# Patient Record
Sex: MALE | Race: BLACK OR AFRICAN AMERICAN | NOT HISPANIC OR LATINO | Employment: OTHER | URBAN - METROPOLITAN AREA
[De-identification: names, ages, dates, MRNs, and addresses within clinical notes are randomized per-mention and may not be internally consistent; named-entity substitution may affect disease eponyms.]

---

## 2017-01-08 DIAGNOSIS — H40.1191 PRIMARY OPEN-ANGLE GLAUCOMA, MILD STAGE: ICD-10-CM

## 2017-01-09 RX ORDER — LATANOPROST 50 UG/ML
SOLUTION/ DROPS OPHTHALMIC
Qty: 2.5 ML | Refills: 0 | Status: SHIPPED | OUTPATIENT
Start: 2017-01-09 | End: 2017-02-01 | Stop reason: SDUPTHER

## 2017-02-01 DIAGNOSIS — H40.1191 PRIMARY OPEN-ANGLE GLAUCOMA, MILD STAGE: ICD-10-CM

## 2017-02-01 RX ORDER — LATANOPROST 50 UG/ML
SOLUTION/ DROPS OPHTHALMIC
Qty: 2.5 ML | Refills: 6 | Status: SHIPPED | OUTPATIENT
Start: 2017-02-01 | End: 2017-02-07 | Stop reason: SDUPTHER

## 2017-02-07 ENCOUNTER — OFFICE VISIT (OUTPATIENT)
Dept: OPTOMETRY | Facility: CLINIC | Age: 67
End: 2017-02-07
Payer: MEDICARE

## 2017-02-07 DIAGNOSIS — H52.4 HYPEROPIA WITH PRESBYOPIA, BILATERAL: ICD-10-CM

## 2017-02-07 DIAGNOSIS — H25.13 NUCLEAR SCLEROSIS, BILATERAL: ICD-10-CM

## 2017-02-07 DIAGNOSIS — H40.1131 PRIMARY OPEN ANGLE GLAUCOMA OF BOTH EYES, MILD STAGE: Primary | ICD-10-CM

## 2017-02-07 DIAGNOSIS — H52.03 HYPEROPIA WITH PRESBYOPIA, BILATERAL: ICD-10-CM

## 2017-02-07 PROCEDURE — 92020 GONIOSCOPY: CPT | Mod: PBBFAC,PO | Performed by: OPTOMETRIST

## 2017-02-07 PROCEDURE — 92015 DETERMINE REFRACTIVE STATE: CPT | Mod: ,,, | Performed by: OPTOMETRIST

## 2017-02-07 PROCEDURE — 99999 PR PBB SHADOW E&M-EST. PATIENT-LVL II: CPT | Mod: PBBFAC,,, | Performed by: OPTOMETRIST

## 2017-02-07 PROCEDURE — 92082 INTERMEDIATE VISUAL FIELD XM: CPT | Mod: PBBFAC,PO | Performed by: OPTOMETRIST

## 2017-02-07 PROCEDURE — 92014 COMPRE OPH EXAM EST PT 1/>: CPT | Mod: S$PBB,,, | Performed by: OPTOMETRIST

## 2017-02-07 PROCEDURE — 99212 OFFICE O/P EST SF 10 MIN: CPT | Mod: PBBFAC,PO | Performed by: OPTOMETRIST

## 2017-02-07 PROCEDURE — 92020 GONIOSCOPY: CPT | Mod: S$PBB,,, | Performed by: OPTOMETRIST

## 2017-02-07 RX ORDER — LATANOPROST 50 UG/ML
1 SOLUTION/ DROPS OPHTHALMIC NIGHTLY
Qty: 2.5 ML | Refills: 6 | Status: SHIPPED | OUTPATIENT
Start: 2017-02-07 | End: 2017-11-21 | Stop reason: SDUPTHER

## 2017-02-07 NOTE — PROGRESS NOTES
HPI     Glaucoma    Additional comments: overdue check up           Comments   DLS 1/14/16    Patient returns for overdue follow up. Pt states is broke gls last yr   sometime, using +2.75 readers to help with vision. Denies any pain. No f/f      Eyemeds   Latanoprost OU QHS , used last night       Last edited by Riley Bradshaw, OD on 2/7/2017 12:01 PM. (History)        ROS     Negative for: Constitutional, Gastrointestinal, Neurological, Skin,   Genitourinary, Musculoskeletal, HENT, Endocrine, Cardiovascular, Eyes,   Respiratory, Psychiatric, Allergic/Imm, Heme/Lymph    Last edited by Riley Bradshaw, OD on 2/7/2017 10:53 AM. (History)        Assessment /Plan     For exam results, see Encounter Report.    Primary open angle glaucoma of both eyes, mild stage  -     latanoprost 0.005 % ophthalmic solution; Place 1 drop into both eyes every evening.  Dispense: 2.5 mL; Refill: 6  -     Mederos Visual Field - OU - Intermediate - Both Eyes    Nuclear sclerosis, bilateral    Hyperopia with presbyopia, bilateral      1. Cont drops, IOP low, HVf poor reliability,  Prev Oct abnormal od and normal os. Prev IOP increased due to pt out of meds. Also prev slight increase in cup right eye. May need to add a  Med in future.  Prev HRT ONL od and borderline os. Pachy thin. Prev Gonio open.  RTC 6 mos IOP ck.  Add med if worsening.   2. Educated pt on presence of cataracts and effects on vision. No surgery at this time. Recheck in one year.  3. Spec Rx given. Different lens options discussed with patient. RTC 1 year full exam.

## 2017-02-11 ENCOUNTER — HOSPITAL ENCOUNTER (OUTPATIENT)
Dept: RADIOLOGY | Facility: HOSPITAL | Age: 67
Discharge: HOME | End: 2017-02-11
Attending: NURSE PRACTITIONER
Payer: MEDICARE

## 2017-02-11 ENCOUNTER — TELEPHONE (OUTPATIENT)
Dept: INTERNAL MEDICINE | Facility: CLINIC | Age: 67
End: 2017-02-11

## 2017-02-11 ENCOUNTER — OFFICE VISIT (OUTPATIENT)
Dept: INTERNAL MEDICINE | Facility: CLINIC | Age: 67
End: 2017-02-11
Payer: MEDICARE

## 2017-02-11 VITALS
DIASTOLIC BLOOD PRESSURE: 62 MMHG | TEMPERATURE: 98 F | WEIGHT: 219.13 LBS | BODY MASS INDEX: 29.68 KG/M2 | SYSTOLIC BLOOD PRESSURE: 128 MMHG | HEART RATE: 64 BPM | HEIGHT: 72 IN

## 2017-02-11 DIAGNOSIS — M25.561 ACUTE PAIN OF RIGHT KNEE: ICD-10-CM

## 2017-02-11 DIAGNOSIS — M25.561 ACUTE PAIN OF RIGHT KNEE: Primary | ICD-10-CM

## 2017-02-11 PROCEDURE — 73562 X-RAY EXAM OF KNEE 3: CPT | Mod: 26,RT,, | Performed by: RADIOLOGY

## 2017-02-11 PROCEDURE — 99213 OFFICE O/P EST LOW 20 MIN: CPT | Mod: S$PBB,,, | Performed by: NURSE PRACTITIONER

## 2017-02-11 PROCEDURE — 99999 PR PBB SHADOW E&M-EST. PATIENT-LVL IV: CPT | Mod: PBBFAC,,, | Performed by: NURSE PRACTITIONER

## 2017-02-11 PROCEDURE — 73560 X-RAY EXAM OF KNEE 1 OR 2: CPT | Mod: 26,59,LT, | Performed by: RADIOLOGY

## 2017-02-11 PROCEDURE — 73560 X-RAY EXAM OF KNEE 1 OR 2: CPT | Mod: TC,59,LT

## 2017-02-11 RX ORDER — SULINDAC 150 MG/1
150 TABLET ORAL 2 TIMES DAILY
Qty: 28 TABLET | Refills: 0 | Status: SHIPPED | OUTPATIENT
Start: 2017-02-11 | End: 2017-04-07

## 2017-02-11 NOTE — TELEPHONE ENCOUNTER
Please let the patient know that the xray of the right knee was essentially normal with degenerative changes but left knee was worse than the right.

## 2017-02-11 NOTE — PROGRESS NOTES
Subjective:       Patient ID: Herman Chirinos is a 66 y.o. male.    Chief Complaint: Knee Pain and tingling in shoulder    HPI Comments: Straightening the knee makes it feel better for a moment  Onset 2 months      Knee Pain    There was no injury mechanism. The pain is present in the right knee. The quality of the pain is described as aching. The pain is at a severity of 7/10. The pain is severe. Pertinent negatives include no numbness. Exacerbated by: when the knee is bent. He has tried heat (ointments, wrapping the knee, exercise, walking) for the symptoms.     Review of Systems   Constitutional: Negative for activity change, appetite change, chills, fever and unexpected weight change.   HENT: Negative for congestion, ear discharge, ear pain, nosebleeds, sinus pressure, sneezing and sore throat.    Eyes: Negative for photophobia, discharge, redness, itching and visual disturbance.   Respiratory: Negative for cough, chest tightness, shortness of breath, wheezing and stridor.    Cardiovascular: Negative for chest pain, palpitations and leg swelling.   Gastrointestinal: Negative for abdominal distention, abdominal pain, blood in stool, constipation, diarrhea, nausea and vomiting.   Genitourinary: Negative for dysuria, frequency, hematuria, scrotal swelling, testicular pain and urgency.   Musculoskeletal: Positive for arthralgias. Negative for back pain, myalgias, neck pain and neck stiffness.   Skin: Negative for rash and wound.   Neurological: Negative for dizziness, light-headedness, numbness and headaches.   Hematological: Negative for adenopathy. Does not bruise/bleed easily.   Psychiatric/Behavioral: Negative for suicidal ideas.       Objective:      Physical Exam   Constitutional: He is oriented to person, place, and time. He appears well-developed and well-nourished.   HENT:   Head: Normocephalic and atraumatic.   Eyes: Pupils are equal, round, and reactive to light.   Neck: Normal range of motion.  "  Pulmonary/Chest: Effort normal. No respiratory distress.   Abdominal: He exhibits no distension.   Musculoskeletal: Normal range of motion.        Right knee: He exhibits LCL laxity. He exhibits normal range of motion, no swelling, no effusion, no ecchymosis, no deformity, no laceration, no erythema, normal alignment, normal patellar mobility, no bony tenderness, normal meniscus and no MCL laxity. No tenderness found.   Neurological: He is alert and oriented to person, place, and time.   Skin: Skin is warm and dry.       Assessment:       1. Acute pain of right knee        Plan:       Herman was seen today for knee pain and tingling in shoulder.    Diagnoses and all orders for this visit:    Acute pain of right knee  -     X-ray Knee Ortho Right; Future  -     sulindac (CLINORIL) 150 MG tablet; Take 1 tablet (150 mg total) by mouth 2 (two) times daily.    ace wrap appled, pt understood instructions to r.i.c.e.    Pt has been given instructions populated from [employer] database and has verbalized understanding of the after visit summary and information contained wherein.    Follow up with a primary care provider. May go to ER for acute shortness of breath, lightheadedness, fever, or any other emergent complaints or changes in condition.    "This note will be shared with the patient"    The [employer] medical Dictation software was used during the dictation of portions or the entirety of this medical record.  Phonetic or grammatic errors may exist due to the use of this software. For clarification, refer to the author of the document.             "

## 2017-02-11 NOTE — MR AVS SNAPSHOT
[facility]  [address]  [place] [address]  Phone: [phone]  Fax: [phone]                  Herman Chirinos   2017 3:00 PM   Office Visit    Description:  Male : 1950   Provider:  Saleem Howell DNP   Department:  [facility]           Reason for Visit     Knee Pain     tingling in shoulder           Diagnoses this Visit        Comments    Acute pain of right knee    -  Primary            To Do List           Future Appointments        Provider Department Dept Phone    2017 7:30 AM MD Leobardo Sharma [facility] [phone]      Goals (5 Years of Data)     None       These Medications        Disp Refills Start End    sulindac (CLINORIL) 150 MG tablet 28 tablet 0 2017    Take 1 tablet (150 mg total) by mouth 2 (two) times daily. - Oral    [facility] [address] [place] [address] [place] [address] [address] AT [facility] [address] Ph #: [phone]         [facility] On Call     [facility] On Call Nurse Care Line -  Assistance  Registered nurses in the [facility] On Call Center provide clinical advisement, health education, appointment booking, and other advisory services.  Call for this free service at [phone].             Medications           Message regarding Medications     Verify the changes and/or additions to your medication regime listed below are the same as discussed with your clinician today.  If any of these changes or additions are incorrect, please notify your healthcare provider.        START taking these NEW medications        Refills    sulindac (CLINORIL) 150 MG tablet 0    Sig: Take 1 tablet (150 mg total) by mouth 2 (two) times daily.    Class: Normal    Route: Oral           Verify that the below list of medications is an accurate representation of the medications you are currently taking.  If none reported, the list may be blank. If incorrect, please contact  your healthcare provider. Carry this list with you in case of emergency.           Current Medications     fluticasone (FLONASE) 50 mcg/actuation nasal spray USE 1 SPRAY IN EACH NOSTRIL ONCE DAILY    latanoprost 0.005 % ophthalmic solution Place 1 drop into both eyes every evening.    pravastatin (PRAVACHOL) 40 MG tablet Take 1 tablet (40 mg total) by mouth once daily.    aspirin 81 MG Chew Take 1 tablet (81 mg total) by mouth once daily.    loratadine (CLARITIN) 10 mg tablet Take 1 tablet (10 mg total) by mouth once daily.    sulindac (CLINORIL) 150 MG tablet Take 1 tablet (150 mg total) by mouth 2 (two) times daily.    tamsulosin (FLOMAX) 0.4 mg Cp24 Take 1 capsule (0.4 mg total) by mouth every evening.           Clinical Reference Information           Your Vitals Were     BP Pulse Temp Height Weight BMI    128/62 64 98.4 °F (36.9 °C) (Oral) 6' (1.829 m) 99.4 kg (219 lb 2.2 oz) 29.72 kg/m2      Blood Pressure          Most Recent Value    BP  128/62      Allergies as of 2/11/2017     No Known Allergies      Immunizations Administered on Date of Encounter - 2/11/2017     None      Orders Placed During Today's Visit     Future Labs/Procedures Expected by Expires    X-ray Knee Ortho Right  2/11/2017 2/11/2018      [facility] Sign-Up     Activating your [facility] account is as easy as 1-2-3!     1) Visit [email or web address], select Sign Up Now, enter this activation code and your date of birth, then select Next.  [email or web address]  Expires: 3/28/2017  3:19 PM      2) Create a username and password to use when you visit [facility] in the future and select a security question in case you lose your password and select Next.    3) Enter your e-mail address and click Sign Up!    Additional Information  If you have questions, please e-mail [email or web address] or call [phone] to talk to our [facility] staff. Remember, [facility] is NOT to be used for urgent needs. For medical emergencies, dial 911.         Instructions       R.I.C.E.    R.I.C.E. stands for Rest, Ice, Compression, and Elevation. Doing these things helps limit pain and swelling after an injury. R.I.C.E. also helps injuries heal faster. Use R.I.C.E. for sprains, strains, and severe bruises or bumps. Follow the tips on this handout and begin R.I.C.E. as soon as possible after an injury.  ? Rest  Pain is your bodys way of telling you to rest an injured area. Whether you have hurt an elbow, hand, foot, or knee, limiting its use will prevent further injury and help you heal.  ? Ice  Applying ice right after an injury helps prevent swelling and reduce pain. Dont place ice directly on your skin.  · Wrap a cold pack or bag of ice in a thin cloth. Place it over the injured area.  · Ice for 10 minutes every 3 hours. Dont ice for more than 20 minutes at a time.  ? Compression  Putting pressure (compression) on an injury helps prevent swelling and provides support.  · Wrap the injured area firmly with an elastic bandage. If your hand or foot tingles, becomes discolored, or feels cold to the touch, the bandage may be too tight. Rewrap it more loosely.  · If your bandage becomes too loose, rewrap it.  · Do not wear an elastic bandage overnight.  ? Elevation  Keeping an injury elevated helps reduce swelling, pain, and throbbing. Elevation is most effective when the injury is kept elevated higher than the heart.     Call your healthcare provider if you notice any of the following:  · Fingers or toes feel numb, are cold to the touch, or change color  · Skin looks shiny or tight  · Pain, swelling, or bruising worsens and is not improved with elevation   Date Last Reviewed: 9/3/2015  © 6968-4946 [employer]. [address] [place] [address], [place] [address]. All rights reserved. This information is not intended as a substitute for professional medical care. Always follow your healthcare professional's instructions.        Knee Pain  Knee pain is very common. Its especially  common in active people who put a lot of pressure on their knees, like runners. It affects women more often than men.  Your kneecap (patella) is a thick, round bone. It covers and protects the front portion of your knee joint. It moves along a groove in your thighbone (femur) as part of the patellofemoral joint. A layer of cartilage surrounds the underside of your kneecap. This layer protects it from grinding against your femur.  When this cartilage softens and breaks down, it can cause knee pain. This is partly because of repetitive stress. The stress irritates the lining of the joint. This causes pain in the underlying bone.  What causes knee pain?  Many things can cause knee pain. You may have more than one cause. Some of these include:  · Overuse of the knee joint  · The kneecap doesnt line up with the tissue around it  · Damage to small nerves in the area  · Damage to the ligament-like structure that holds the kneecap in place (retinaculum)  · Breakdown of the bone under the cartilage  · Swelling in the soft tissues around the kneecap  · Injury  You might be more likely to have knee pain if you:  · Exercise a lot  · Recently increased the intensity of your workouts  · Have a body mass index (BMI) greater than 25  · Have poor alignment of your kneecap  · Walk with your feet turned overly outward or inward  · Have weakness in surrounding muscle groups (inner quad or hip adductor muscles)  · Have too much tightness in surrounding muscle groups (hamstrings or iliotibial band)  · Have a recent history of injury to the area  · Are female  Symptoms of knee pain  This type of knee pain is a dull, aching pain in the front of the knee in the area under and around the kneecap. This pain may start quickly or slowly. Your pain might be worse when you squat, run, or sit for a long time. You might also sometimes feel like your knee is giving out. You may have symptoms in one or both of your knees.  Diagnosing knee pain  Your  health care provider will ask about your medical history and your symptoms. Be sure to describe any activities that make your knee pain worse. He or she will look at your knee. This will include tests of your range of motion, strength, and areas of pain of your knee. Your knee alignment will be checked.  Your health care provider will need to rule out other causes of your knee pain, such as arthritis. You may need an imaging test, such as an X-ray or MRI.  Treatment for knee pain  Treatments that can help ease your symptoms may include:  · Avoiding activities for a while that make your pain worse, returning to activity over time  · Icing the outside of your knee when it causes you pain  · Taking over-the-counter pain medicine  · Wearing a knee brace or taping your knee to support it  · Wearing special shoe inserts to help keep your feet in the proper alignment  · Doing special exercises to stretch and strengthen the muscles around your hip and your knee  These steps help most people manage knee pain. But some cases of knee pain need to be treated with surgery. You may need surgery right away. Or you may need it later if other treatments dont work. Your health care provider may refer you to an orthopedic surgeon. He or she will talk with you about your choices.  Preventing knee pain  Losing weight and correcting excess muscle tightness or muscle weakness may help lower your risk.  In some cases, you can prevent knee pain. To help prevent a flare-up of knee pain, you do these things:  · Regularly do all the exercises your doctor or physical therapist advises  · Support your knee as advised by your doctor or physical therapist  · Increase training gradually, and ease up on training when needed  · Have an expert check your gait for running or other sporting activities  · Stretch properly before and after exercise  · Replace your running shoes regularly  · Lose excess weight     When to call your health care  provider  Call your health care provider right away if:  · Your symptoms dont get better after a few weeks of treatment  · You have any new symptoms   Date Last Reviewed: 3/19/2015  © 3928-1220 [employer]. [address] [place] [address], [place] [address]. All rights reserved. This information is not intended as a substitute for professional medical care. Always follow your healthcare professional's instructions.             Language Assistance Services     ATTENTION: Language assistance services are available, free of charge. Please call [phone].      ATENCIÓN: Si habla español, tiene a quach disposición servicios gratuitos de asistencia lingüística. Llame al [phone].     CRISELDA Ý: N?u b?n nói Ti?ng Vi?t, có các d?ch v? h? tr? ngôn ng? mi?n phí dành cho b?n. G?i s? [phone].         [facility] complies with applicable Federal civil rights laws and does not discriminate on the basis of race, color, national origin, age, disability, or sex.

## 2017-02-11 NOTE — PROGRESS NOTES
Please see related notes in phone encounter of today's date.    MA/Nurse:  Please call the patient with these results.

## 2017-02-11 NOTE — PATIENT INSTRUCTIONS
R.I.C.E.    R.I.C.E. stands for Rest, Ice, Compression, and Elevation. Doing these things helps limit pain and swelling after an injury. R.I.C.E. also helps injuries heal faster. Use R.I.C.E. for sprains, strains, and severe bruises or bumps. Follow the tips on this handout and begin R.I.C.E. as soon as possible after an injury.  ? Rest  Pain is your bodys way of telling you to rest an injured area. Whether you have hurt an elbow, hand, foot, or knee, limiting its use will prevent further injury and help you heal.  ? Ice  Applying ice right after an injury helps prevent swelling and reduce pain. Dont place ice directly on your skin.  · Wrap a cold pack or bag of ice in a thin cloth. Place it over the injured area.  · Ice for 10 minutes every 3 hours. Dont ice for more than 20 minutes at a time.  ? Compression  Putting pressure (compression) on an injury helps prevent swelling and provides support.  · Wrap the injured area firmly with an elastic bandage. If your hand or foot tingles, becomes discolored, or feels cold to the touch, the bandage may be too tight. Rewrap it more loosely.  · If your bandage becomes too loose, rewrap it.  · Do not wear an elastic bandage overnight.  ? Elevation  Keeping an injury elevated helps reduce swelling, pain, and throbbing. Elevation is most effective when the injury is kept elevated higher than the heart.     Call your healthcare provider if you notice any of the following:  · Fingers or toes feel numb, are cold to the touch, or change color  · Skin looks shiny or tight  · Pain, swelling, or bruising worsens and is not improved with elevation   Date Last Reviewed: 9/3/2015  © 6500-4334 [employer]. [address] [place] [address], [place] [address]. All rights reserved. This information is not intended as a substitute for professional medical care. Always follow your healthcare professional's instructions.        Knee Pain  Knee pain is very common. Its especially  common in active people who put a lot of pressure on their knees, like runners. It affects women more often than men.  Your kneecap (patella) is a thick, round bone. It covers and protects the front portion of your knee joint. It moves along a groove in your thighbone (femur) as part of the patellofemoral joint. A layer of cartilage surrounds the underside of your kneecap. This layer protects it from grinding against your femur.  When this cartilage softens and breaks down, it can cause knee pain. This is partly because of repetitive stress. The stress irritates the lining of the joint. This causes pain in the underlying bone.  What causes knee pain?  Many things can cause knee pain. You may have more than one cause. Some of these include:  · Overuse of the knee joint  · The kneecap doesnt line up with the tissue around it  · Damage to small nerves in the area  · Damage to the ligament-like structure that holds the kneecap in place (retinaculum)  · Breakdown of the bone under the cartilage  · Swelling in the soft tissues around the kneecap  · Injury  You might be more likely to have knee pain if you:  · Exercise a lot  · Recently increased the intensity of your workouts  · Have a body mass index (BMI) greater than 25  · Have poor alignment of your kneecap  · Walk with your feet turned overly outward or inward  · Have weakness in surrounding muscle groups (inner quad or hip adductor muscles)  · Have too much tightness in surrounding muscle groups (hamstrings or iliotibial band)  · Have a recent history of injury to the area  · Are female  Symptoms of knee pain  This type of knee pain is a dull, aching pain in the front of the knee in the area under and around the kneecap. This pain may start quickly or slowly. Your pain might be worse when you squat, run, or sit for a long time. You might also sometimes feel like your knee is giving out. You may have symptoms in one or both of your knees.  Diagnosing knee pain  Your  health care provider will ask about your medical history and your symptoms. Be sure to describe any activities that make your knee pain worse. He or she will look at your knee. This will include tests of your range of motion, strength, and areas of pain of your knee. Your knee alignment will be checked.  Your health care provider will need to rule out other causes of your knee pain, such as arthritis. You may need an imaging test, such as an X-ray or MRI.  Treatment for knee pain  Treatments that can help ease your symptoms may include:  · Avoiding activities for a while that make your pain worse, returning to activity over time  · Icing the outside of your knee when it causes you pain  · Taking over-the-counter pain medicine  · Wearing a knee brace or taping your knee to support it  · Wearing special shoe inserts to help keep your feet in the proper alignment  · Doing special exercises to stretch and strengthen the muscles around your hip and your knee  These steps help most people manage knee pain. But some cases of knee pain need to be treated with surgery. You may need surgery right away. Or you may need it later if other treatments dont work. Your health care provider may refer you to an orthopedic surgeon. He or she will talk with you about your choices.  Preventing knee pain  Losing weight and correcting excess muscle tightness or muscle weakness may help lower your risk.  In some cases, you can prevent knee pain. To help prevent a flare-up of knee pain, you do these things:  · Regularly do all the exercises your doctor or physical therapist advises  · Support your knee as advised by your doctor or physical therapist  · Increase training gradually, and ease up on training when needed  · Have an expert check your gait for running or other sporting activities  · Stretch properly before and after exercise  · Replace your running shoes regularly  · Lose excess weight     When to call your health care  provider  Call your health care provider right away if:  · Your symptoms dont get better after a few weeks of treatment  · You have any new symptoms   Date Last Reviewed: 3/19/2015  © 1975-4191 [employer]. [address] [place] [address], [place] [address]. All rights reserved. This information is not intended as a substitute for professional medical care. Always follow your healthcare professional's instructions.

## 2017-02-13 DIAGNOSIS — E78.5 HYPERLIPIDEMIA: ICD-10-CM

## 2017-02-13 RX ORDER — PRAVASTATIN SODIUM 40 MG/1
TABLET ORAL
Qty: 90 TABLET | Refills: 0 | Status: SHIPPED | OUTPATIENT
Start: 2017-02-13 | End: 2017-04-10 | Stop reason: SDUPTHER

## 2017-02-14 DIAGNOSIS — E78.5 HYPERLIPIDEMIA: ICD-10-CM

## 2017-02-14 RX ORDER — PRAVASTATIN SODIUM 40 MG/1
TABLET ORAL
Qty: 90 TABLET | Refills: 0 | Status: SHIPPED | OUTPATIENT
Start: 2017-02-14 | End: 2017-04-07

## 2017-02-21 ENCOUNTER — TELEPHONE (OUTPATIENT)
Dept: INTERNAL MEDICINE | Facility: CLINIC | Age: 67
End: 2017-02-21

## 2017-02-21 NOTE — TELEPHONE ENCOUNTER
----- Message from Lisa Rene sent at 2/20/2017  4:44 PM CST -----  Contact: [phone] or [phone]  Patient would like to get test results.  Name of test (lab, mammo, etc.):  X rays  Date of test:  A week ago   Ordering provider: mary  Where was the test performed:  [facility]  Comments:

## 2017-02-24 ENCOUNTER — TELEPHONE (OUTPATIENT)
Dept: INTERNAL MEDICINE | Facility: CLINIC | Age: 67
End: 2017-02-24

## 2017-02-24 NOTE — TELEPHONE ENCOUNTER
Spoke with patient-he had not been taking the sulindac as prescribed, because he misunderstood his discharge instructions.  He will try taking it BID to see if knee pain resolves & will call us if it does not.  He asked for us to send a message to his PCP to have her fill out his annual handicap tag paperwork for him.  Will route to Dr Peck.

## 2017-02-24 NOTE — TELEPHONE ENCOUNTER
----- Message from Ag Phan sent at 2/24/2017  8:43 AM CST -----  Contact: self/ [phone] cell  Pt is calling to f/u with the office regarding his Xray results and next steps to take.  Pt would like a call back today, if possible.  Please call and advise.      Thank you

## 2017-04-07 ENCOUNTER — OFFICE VISIT (OUTPATIENT)
Dept: ORTHOPEDICS | Facility: CLINIC | Age: 67
End: 2017-04-07
Payer: MEDICARE

## 2017-04-07 ENCOUNTER — OFFICE VISIT (OUTPATIENT)
Dept: INTERNAL MEDICINE | Facility: CLINIC | Age: 67
End: 2017-04-07
Payer: MEDICARE

## 2017-04-07 ENCOUNTER — LAB VISIT (OUTPATIENT)
Dept: LAB | Facility: HOSPITAL | Age: 67
End: 2017-04-07
Attending: INTERNAL MEDICINE
Payer: MEDICARE

## 2017-04-07 VITALS
BODY MASS INDEX: 28.97 KG/M2 | HEIGHT: 72 IN | WEIGHT: 213.88 LBS | SYSTOLIC BLOOD PRESSURE: 112 MMHG | HEART RATE: 76 BPM | TEMPERATURE: 98 F | DIASTOLIC BLOOD PRESSURE: 74 MMHG

## 2017-04-07 DIAGNOSIS — Z12.5 ENCOUNTER FOR SCREENING FOR MALIGNANT NEOPLASM OF PROSTATE: ICD-10-CM

## 2017-04-07 DIAGNOSIS — N40.0 BENIGN NODULAR PROSTATIC HYPERPLASIA WITHOUT LOWER URINARY TRACT SYMPTOMS: ICD-10-CM

## 2017-04-07 DIAGNOSIS — R93.1 AGATSTON CORONARY ARTERY CALCIUM SCORE LESS THAN 100: ICD-10-CM

## 2017-04-07 DIAGNOSIS — N52.9 ERECTILE DYSFUNCTION, UNSPECIFIED ERECTILE DYSFUNCTION TYPE: ICD-10-CM

## 2017-04-07 DIAGNOSIS — E66.09 NON MORBID OBESITY DUE TO EXCESS CALORIES: ICD-10-CM

## 2017-04-07 DIAGNOSIS — N13.8 BPH WITH URINARY OBSTRUCTION: ICD-10-CM

## 2017-04-07 DIAGNOSIS — D12.6 TUBULAR ADENOMA OF COLON: ICD-10-CM

## 2017-04-07 DIAGNOSIS — M17.11 OSTEOARTHRITIS OF RIGHT KNEE, UNSPECIFIED OSTEOARTHRITIS TYPE: Primary | ICD-10-CM

## 2017-04-07 DIAGNOSIS — N40.1 BPH WITH URINARY OBSTRUCTION: ICD-10-CM

## 2017-04-07 DIAGNOSIS — E78.2 MIXED HYPERLIPIDEMIA: ICD-10-CM

## 2017-04-07 DIAGNOSIS — R73.01 IFG (IMPAIRED FASTING GLUCOSE): Primary | ICD-10-CM

## 2017-04-07 DIAGNOSIS — M25.561 CHRONIC PAIN OF RIGHT KNEE: ICD-10-CM

## 2017-04-07 DIAGNOSIS — R73.01 IFG (IMPAIRED FASTING GLUCOSE): ICD-10-CM

## 2017-04-07 DIAGNOSIS — M23.8X1 MCL DEFICIENCY, KNEE, RIGHT: ICD-10-CM

## 2017-04-07 DIAGNOSIS — G89.29 CHRONIC PAIN OF RIGHT KNEE: ICD-10-CM

## 2017-04-07 DIAGNOSIS — M48.061 LUMBAR SPINAL STENOSIS: ICD-10-CM

## 2017-04-07 LAB
ALBUMIN SERPL BCP-MCNC: 3.7 G/DL
ALP SERPL-CCNC: 45 U/L
ALT SERPL W/O P-5'-P-CCNC: 16 U/L
ANION GAP SERPL CALC-SCNC: 10 MMOL/L
AST SERPL-CCNC: 25 U/L
BASOPHILS # BLD AUTO: 0.06 K/UL
BASOPHILS NFR BLD: 1 %
BILIRUB SERPL-MCNC: 0.6 MG/DL
BUN SERPL-MCNC: 15 MG/DL
CALCIUM SERPL-MCNC: 9.4 MG/DL
CHLORIDE SERPL-SCNC: 105 MMOL/L
CHOLEST/HDLC SERPL: 3.6 {RATIO}
CO2 SERPL-SCNC: 25 MMOL/L
COMPLEXED PSA SERPL-MCNC: 3 NG/ML
CREAT SERPL-MCNC: 0.9 MG/DL
DIFFERENTIAL METHOD: ABNORMAL
EOSINOPHIL # BLD AUTO: 0.1 K/UL
EOSINOPHIL NFR BLD: 0.9 %
ERYTHROCYTE [DISTWIDTH] IN BLOOD BY AUTOMATED COUNT: 15.1 %
EST. GFR  (AFRICAN AMERICAN): >60 ML/MIN/1.73 M^2
EST. GFR  (NON AFRICAN AMERICAN): >60 ML/MIN/1.73 M^2
ESTIMATED AVG GLUCOSE: 128 MG/DL
GLUCOSE SERPL-MCNC: 100 MG/DL
HBA1C MFR BLD HPLC: 6.1 %
HCT VFR BLD AUTO: 42.8 %
HDL/CHOLESTEROL RATIO: 27.6 %
HDLC SERPL-MCNC: 174 MG/DL
HDLC SERPL-MCNC: 48 MG/DL
HGB BLD-MCNC: 14.4 G/DL
LDLC SERPL CALC-MCNC: 113.8 MG/DL
LYMPHOCYTES # BLD AUTO: 1.3 K/UL
LYMPHOCYTES NFR BLD: 22.1 %
MCH RBC QN AUTO: 29.4 PG
MCHC RBC AUTO-ENTMCNC: 33.6 %
MCV RBC AUTO: 88 FL
MONOCYTES # BLD AUTO: 0.6 K/UL
MONOCYTES NFR BLD: 10.6 %
NEUTROPHILS # BLD AUTO: 3.8 K/UL
NEUTROPHILS NFR BLD: 65.2 %
NONHDLC SERPL-MCNC: 126 MG/DL
PLATELET # BLD AUTO: 255 K/UL
PMV BLD AUTO: 9.3 FL
POTASSIUM SERPL-SCNC: 4.1 MMOL/L
PROT SERPL-MCNC: 7.2 G/DL
RBC # BLD AUTO: 4.89 M/UL
SODIUM SERPL-SCNC: 140 MMOL/L
TRIGL SERPL-MCNC: 61 MG/DL
WBC # BLD AUTO: 5.85 K/UL

## 2017-04-07 PROCEDURE — 99214 OFFICE O/P EST MOD 30 MIN: CPT | Mod: S$PBB,,, | Performed by: INTERNAL MEDICINE

## 2017-04-07 PROCEDURE — 99211 OFF/OP EST MAY X REQ PHY/QHP: CPT | Mod: PBBFAC | Performed by: PHYSICIAN ASSISTANT

## 2017-04-07 PROCEDURE — 99203 OFFICE O/P NEW LOW 30 MIN: CPT | Mod: S$PBB,,, | Performed by: PHYSICIAN ASSISTANT

## 2017-04-07 PROCEDURE — 99999 PR PBB SHADOW E&M-EST. PATIENT-LVL IV: CPT | Mod: PBBFAC,,, | Performed by: INTERNAL MEDICINE

## 2017-04-07 PROCEDURE — 99999 PR PBB SHADOW E&M-EST. PATIENT-LVL I: CPT | Mod: PBBFAC,,, | Performed by: PHYSICIAN ASSISTANT

## 2017-04-07 RX ORDER — TAMSULOSIN HYDROCHLORIDE 0.4 MG/1
0.4 CAPSULE ORAL NIGHTLY
Qty: 90 CAPSULE | Refills: 3 | Status: SHIPPED | OUTPATIENT
Start: 2017-04-07 | End: 2018-04-08 | Stop reason: SDUPTHER

## 2017-04-07 RX ORDER — SILDENAFIL 50 MG/1
50 TABLET, FILM COATED ORAL DAILY PRN
Qty: 30 TABLET | Refills: 6 | Status: SHIPPED | OUTPATIENT
Start: 2017-04-07 | End: 2019-04-26 | Stop reason: SDUPTHER

## 2017-04-07 NOTE — MR AVS SNAPSHOT
[facility]  [address]  [place] [address]  Phone: [phone]  Fax: [phone]                  Herman Chirinos   2017 7:30 AM   Office Visit    Description:  Male : 1950   Provider:  Betsy Haines MD   Department:  [facility]           Reason for Visit     Knee Pain     Hypertension     Hyperlipidemia           Diagnoses this Visit        Comments    IFG (impaired fasting glucose)    -  Primary     Non morbid obesity due to excess calories         Mixed hyperlipidemia         Agatston coronary artery calcium score less than 100         Tubular adenoma of colon         Chronic pain of right knee         Encounter for screening for malignant neoplasm of prostate         Benign nodular prostatic hyperplasia without lower urinary tract symptoms         BPH with urinary obstruction         Erectile dysfunction, unspecified erectile dysfunction type         Lumbar spinal stenosis                To Do List           Future Appointments        Provider Department Dept Phone    2017 8:10 AM LAB, APPOINTMENT [facility] [phone]      Goals (5 Years of Data)     None       These Medications        Disp Refills Start End    tamsulosin (FLOMAX) 0.4 mg Cp24 90 capsule 3 2017    Take 1 capsule (0.4 mg total) by mouth every evening. - Oral    Pharmacy: [facility] [id number] Ph #: [phone]       sildenafil (VIAGRA) 50 MG tablet 30 tablet 6 2017    Take 1 tablet (50 mg total) by mouth daily as needed for Erectile Dysfunction. - Oral    Pharmacy: [facility] [id number] [place] Ph #: [phone]         [facility]     [facility] Nurse Care Line -  Assistance  Unless otherwise directed by your provider, please contact [facility], our nurse care  line that is available for 24/7 assistance.     Registered nurses in the [facility] provide: appointment scheduling, clinical advisement, health education, and other advisory services.  Call: [phone] (toll free)               Medications           Message regarding Medications     Verify the changes and/or additions to your medication regime listed below are the same as discussed with your clinician today.  If any of these changes or additions are incorrect, please notify your healthcare provider.        START taking these NEW medications        Refills    sildenafil (VIAGRA) 50 MG tablet 6    Sig: Take 1 tablet (50 mg total) by mouth daily as needed for Erectile Dysfunction.    Class: Normal    Route: Oral      STOP taking these medications     sulindac (CLINORIL) 150 MG tablet Take 1 tablet (150 mg total) by mouth 2 (two) times daily.           Verify that the below list of medications is an accurate representation of the medications you are currently taking.  If none reported, the list may be blank. If incorrect, please contact your healthcare provider. Carry this list with you in case of emergency.           Current Medications     aspirin 81 MG Chew Take 1 tablet (81 mg total) by mouth once daily.    fluticasone (FLONASE) 50 mcg/actuation nasal spray USE 1 SPRAY IN EACH NOSTRIL ONCE DAILY    latanoprost 0.005 % ophthalmic solution Place 1 drop into both eyes every evening.    loratadine (CLARITIN) 10 mg tablet Take 1 tablet (10 mg total) by mouth once daily.    pravastatin (PRAVACHOL) 40 MG tablet TAKE 1 TABLET BY MOUTH EVERY DAY    sildenafil (VIAGRA) 50 MG tablet Take 1 tablet (50 mg total) by mouth daily as needed for Erectile Dysfunction.    tamsulosin (FLOMAX) 0.4 mg Cp24 Take 1 capsule (0.4 mg total) by mouth every evening.           Clinical Reference Information           Your Vitals Were     BP Pulse Temp Height Weight BMI    112/74 76 97.9 °F (36.6 °C) (Oral) 6' (1.829 m) 97 kg (213  lb 13.5 oz) 29 kg/m2      Blood Pressure          Most Recent Value    BP  112/74      Allergies as of 4/7/2017     No Known Allergies      Immunizations Administered on Date of Encounter - 4/7/2017     None      Orders Placed During Today's Visit      Normal Orders This Visit    Ambulatory Consult to [facility]     Ambulatory referral to Orthopedics     Future Labs/Procedures Expected by Expires    CBC auto differential  4/7/2017 4/7/2018    Comprehensive metabolic panel  4/7/2017 4/7/2018    Hemoglobin A1c  4/7/2017 4/7/2018    Lipid panel  4/7/2017 4/7/2018    PSA, Screening  4/7/2017 4/7/2018      [facility] Sign-Up     Activating your [facility] account is as easy as 1-2-3!     1) Visit [email or web address], select Sign Up Now, enter this activation code and your date of birth, then select Next.  [email or web address]  Expires: 5/22/2017  8:01 AM      2) Create a username and password to use when you visit [facility] in the future and select a security question in case you lose your password and select Next.    3) Enter your e-mail address and click Sign Up!    Additional Information  If you have questions, please e-mail [email or web address] or call [phone] to talk to our [facility] staff. Remember, [facility] is NOT to be used for urgent needs. For medical emergencies, dial 911.         Instructions      Shingles  Shingles is a viral infection caused by the same virus as chicken pox. Anyone who has had chicken pox may get shingles later in life. The virus stays in the body, but remains dormant (asleep). Shingles often occurs in older persons or persons with lowered immunity. But it can affect anyone at any age.  Shingles starts as a tingling patch of skin on one side of the body. Small, painful blisters may then appear. The rash does not spread to the rest of the body.  Exposure to shingles cannot cause shingles. However, it can cause chicken pox in anyone who has not had chicken pox or has not been  vaccinated. The contagious period ends when all blisters have crusted over (generally about 2 weeks after the illness begins).  After the blisters heal, the affected skin may be sensitive or painful for months (neuralgia). This often gradually goes away.  A shingles vaccine is available. This can help prevent shingles or make it less painful. It is generally recommended for adults over the age of 60 who have had chicken pox in the past, but who have never had shingles. Adults over 60 who have had neither chicken pox nor shingles can prevent both diseases with the chicken pox vaccine. Ask your healthcare provider about these vaccines.  Home care  · Medicines may be prescribed to help relieve pain. Take these medicines as directed. Ask your healthcare provider or pharmacist before using over-the-counter medicines for helping treat pain and itching.  · In certain cases, antiviral medicines may be prescribed to reduce pain, shorten the illness, and prevent neuralgia. Take these medicines as directed.  · Compresses made from a solution of cool water mixed with cornstarch or baking soda may help relieve pain and itching.   · Gently wash skin daily with soap and water to help prevent infection.  Be certain to rinse off all of the soap, which can be irritating.  · Trim fingernails and try not to scratch. Scratching the sores may leave scars.  · Stay home from work or school until all blisters have formed a crust and you are no longer contagious.  Follow-up care  Follow up with your healthcare provider or as directed by our staff.  When to seek medical advice  · Fever of 100.4°F (38°C) or higher, or as directed by your healthcare provider  · Affected skin is on the face or neck and any of the following occur:  ¨ Headache  ¨ Eye pain  ¨ Changes in vision  ¨ Sores near the eye  ¨ Weakness of facial muscles  · Pain, redness, or swelling of a joint  · Signs of skin infection: colored drainage from the sores, warmth, increasing  redness, or increasing pain  Date Last Reviewed: 9/25/2015  © 0948-9177 [employer]. [address] [place] [address], [place] [address]. All rights reserved. This information is not intended as a substitute for professional medical care. Always follow your healthcare professional's instructions.             Language Assistance Services     ATTENTION: Language assistance services are available, free of charge. Please call [phone].      ATENCIÓN: Si habla español, tiene a quach disposición servicios gratuitos de asistencia lingüística. Llame al [phone].     CRISELDA Ý: N?u b?n nói Ti?ng Vi?t, có các d?ch v? h? tr? ngôn ng? mi?n phí dành cho b?n. G?i s? [phone].         [facility] complies with applicable Federal civil rights laws and does not discriminate on the basis of race, color, national origin, age, disability, or sex.

## 2017-04-07 NOTE — PROGRESS NOTES
Subjective:       Patient ID: Herman Chirinos is a 66 y.o. male.    Chief Complaint: Knee Pain (right pain=6); Hypertension; and Hyperlipidemia    HPI Comments: Follow up multiple medical issues.     Knee pain R hurts a little, aching feeling.   If he sits, it may radiate a little- knee gets stiff.  If he stretches it this relieves it.  Sx not worse with walking.  In fact, when standing sx are better.  Worse with sitting.    He has had sx for many months.  No recalled injury.   Has tried BenGay, Alleve.  Gets some relief with Alleve.    Also a small painless lump on the chest.    Lipids and glucose to be checked.    BP doing well.    Tubular adenoma 4/16 due 2021 again.    Patient Active Problem List:     Chronic rhinitis     Lumbar stenosis     Mixed hyperlipidemia     POAG (primary open-angle glaucoma) - Both Eyes     Dry eyes - Both Eyes     Nuclear sclerosis     Erectile dysfunction     Agatston coronary artery calcium score less than 100     IFG (impaired fasting glucose)     Non morbid obesity due to excess calories     Tubular adenoma of colon: 4/16 repeat 2021      Knee Pain      Hypertension   Pertinent negatives include no chest pain, headaches or palpitations.   Hyperlipidemia   Pertinent negatives include no chest pain.     Review of Systems   Constitutional: Negative for activity change and unexpected weight change.   HENT: Negative for hearing loss, rhinorrhea and trouble swallowing.    Eyes: Negative for discharge and visual disturbance.   Respiratory: Negative for wheezing.    Cardiovascular: Negative for chest pain and palpitations.   Gastrointestinal: Negative for blood in stool, constipation, diarrhea and vomiting.   Endocrine: Negative for polydipsia and polyuria.   Genitourinary: Negative for difficulty urinating, hematuria and urgency.        ED  Nocturia, BPH sx   Musculoskeletal: Positive for arthralgias and back pain. Negative for joint swelling.        See above  Also spinal stenosis    Neurological: Negative for weakness and headaches.   Psychiatric/Behavioral: Negative for confusion and dysphoric mood.       Objective:      Physical Exam   Constitutional: He is oriented to person, place, and time. He appears well-developed and well-nourished.   HENT:   Head: Normocephalic and atraumatic.   Right Ear: External ear normal.   Left Ear: External ear normal.   Eyes: Conjunctivae and EOM are normal.   Neck: Normal range of motion. Neck supple. No thyromegaly present.   Cardiovascular: Normal rate and regular rhythm.    No murmur heard.  Pulmonary/Chest: Effort normal and breath sounds normal. No respiratory distress. He has no wheezes.   Abdominal: Soft. He exhibits no distension. There is no tenderness.   Musculoskeletal: He exhibits no edema or tenderness.   Lymphadenopathy:     He has no cervical adenopathy.   Neurological: He is alert and oriented to person, place, and time. No cranial nerve deficit.   Skin: Skin is warm and dry.   Psychiatric: He has a normal mood and affect. His behavior is normal.       Assessment:       1. IFG (impaired fasting glucose)    2. Non morbid obesity due to excess calories    3. Mixed hyperlipidemia    4. Agatston coronary artery calcium score less than 100    5. Tubular adenoma of colon: 4/16 repeat 2021        Plan:         Herman was seen today for knee pain, hypertension and hyperlipidemia.    Diagnoses and all orders for this visit:    IFG (impaired fasting glucose)  -     Hemoglobin A1c; Future    Non morbid obesity due to excess calories    Mixed hyperlipidemia  -     CBC auto differential; Future  -     Comprehensive metabolic panel; Future  -     Lipid panel; Future    Agatston coronary artery calcium score less than 100    Tubular adenoma of colon: 4/16 repeat 2021    Chronic pain of right knee  -     Ambulatory referral to Orthopedics    Encounter for screening for malignant neoplasm of prostate  -     PSA, Screening; Future    Benign nodular prostatic  hyperplasia without lower urinary tract symptoms: no alarm sx- he declines additional tx or Urology assessment    BPH with urinary obstruction  -     tamsulosin (FLOMAX) 0.4 mg Cp24; Take 1 capsule (0.4 mg total) by mouth every evening.    Erectile dysfunction, unspecified erectile dysfunction type  -     sildenafil (VIAGRA) 50 MG tablet; Take 1 tablet (50 mg total) by mouth daily as needed for Erectile Dysfunction.    Lumbar spinal stenosis  -     Ambulatory Consult to [facility]    I will review all studies and determine further tx depending on findings

## 2017-04-07 NOTE — PROGRESS NOTES
Subjective:      Patient ID: Herman Chirinos is a 66 y.o. male.    Chief Complaint: Pain of the Right Knee    HPI    Patient is a 66 year old male who presents to clinic with chief complaint of a-traumatic intermittent right knee pain x awhile. Pain is increased with sitting and increased walking. Patient stated that stretching the leg helps as well as Aleve. He has also tried Bengay which helps. Patient reports popping cracking and feeling weak at times. Denied catching.     Review of Systems   Constitution: Negative for chills and fever.   Cardiovascular: Negative for chest pain.   Respiratory: Negative for cough and shortness of breath.    Skin: Negative for color change, dry skin, itching, nail changes, poor wound healing and rash.   Musculoskeletal:        Right knee pain   Neurological: Negative for dizziness.   Psychiatric/Behavioral: Negative for altered mental status. The patient is not nervous/anxious.    All other systems reviewed and are negative.        Objective:            General    Constitutional: He is oriented to person, place, and time. He appears well-developed and well-nourished. No distress.   HENT:   Head: Atraumatic.   Eyes: Conjunctivae are normal.   Cardiovascular: Normal rate.    Pulmonary/Chest: Effort normal.   Neurological: He is alert and oriented to person, place, and time.   Psychiatric: He has a normal mood and affect. His behavior is normal.           Right Knee Exam     Inspection   Swelling: absent  Bruising: absent    Tenderness   The patient is tender to palpation of the MCL.    Tests   Meniscus   Georgie:  Medial - negative Lateral - negative  Ligament Examination Lachman: normal (-1 to 2mm) PCL-Posterior Drawer: normal (0 to 2mm)     MCL - Valgus: normal (0 to 2mm)  LCL - Varus: normal    Other   Sensation: normal    Muscle Strength   Right Lower Extremity   Quadriceps:  5/5   Hamstrin/5     RADS: no fracture or dislocation mild to moderate degenerative changes            Assessment:       Encounter Diagnoses   Name Primary?    Osteoarthritis of right knee, unspecified osteoarthritis type Yes    MCL deficiency, knee, right           Plan:       Discussed treatment option with patient At this time he would like to   Rest ice elevation and OTC NSAID.   return to clinic as needed.

## 2017-04-07 NOTE — PATIENT INSTRUCTIONS
Shingles  Shingles is a viral infection caused by the same virus as chicken pox. Anyone who has had chicken pox may get shingles later in life. The virus stays in the body, but remains dormant (asleep). Shingles often occurs in older persons or persons with lowered immunity. But it can affect anyone at any age.  Shingles starts as a tingling patch of skin on one side of the body. Small, painful blisters may then appear. The rash does not spread to the rest of the body.  Exposure to shingles cannot cause shingles. However, it can cause chicken pox in anyone who has not had chicken pox or has not been vaccinated. The contagious period ends when all blisters have crusted over (generally about 2 weeks after the illness begins).  After the blisters heal, the affected skin may be sensitive or painful for months (neuralgia). This often gradually goes away.  A shingles vaccine is available. This can help prevent shingles or make it less painful. It is generally recommended for adults over the age of 60 who have had chicken pox in the past, but who have never had shingles. Adults over 60 who have had neither chicken pox nor shingles can prevent both diseases with the chicken pox vaccine. Ask your healthcare provider about these vaccines.  Home care  · Medicines may be prescribed to help relieve pain. Take these medicines as directed. Ask your healthcare provider or pharmacist before using over-the-counter medicines for helping treat pain and itching.  · In certain cases, antiviral medicines may be prescribed to reduce pain, shorten the illness, and prevent neuralgia. Take these medicines as directed.  · Compresses made from a solution of cool water mixed with cornstarch or baking soda may help relieve pain and itching.   · Gently wash skin daily with soap and water to help prevent infection.  Be certain to rinse off all of the soap, which can be irritating.  · Trim fingernails and try not to scratch. Scratching the sores  may leave scars.  · Stay home from work or school until all blisters have formed a crust and you are no longer contagious.  Follow-up care  Follow up with your healthcare provider or as directed by our staff.  When to seek medical advice  · Fever of 100.4°F (38°C) or higher, or as directed by your healthcare provider  · Affected skin is on the face or neck and any of the following occur:  ¨ Headache  ¨ Eye pain  ¨ Changes in vision  ¨ Sores near the eye  ¨ Weakness of facial muscles  · Pain, redness, or swelling of a joint  · Signs of skin infection: colored drainage from the sores, warmth, increasing redness, or increasing pain  Date Last Reviewed: 9/25/2015  © 8567-5049 [employer]. [address] [place] [address], [place] [address]. All rights reserved. This information is not intended as a substitute for professional medical care. Always follow your healthcare professional's instructions.

## 2017-04-07 NOTE — LETTER
April 7, 2017      Betsy Haines MD  [address]  [facility] [address]           [facility]  [address]  [facility] [address]  Phone: [phone]          Patient: Herman Chirinos   MR Number: [id number]   YOB: 1950   Date of Visit: 4/7/2017       Dear Dr. Betsy Haines:    Thank you for referring Herman Chirinos to me for evaluation. Attached you will find relevant portions of my assessment and plan of care.    If you have questions, please do not hesitate to call me. I look forward to following Herman Chirinos along with you.    Sincerely,    Corina Neville PA-C    Enclosure  CC:  No Recipients    If you would like to receive this communication electronically, please contact [email or web address] or [phone] to request more information on [employer] Link access.    For providers and/or their staff who would like to refer a patient to [facility], please contact us through our one-stop-shop provider referral line, Robby Verduzco, at [phone].    If you feel you have received this communication in error or would no longer like to receive these types of communications, please e-mail [email or web address]

## 2017-04-10 ENCOUNTER — TELEPHONE (OUTPATIENT)
Dept: INTERNAL MEDICINE | Facility: CLINIC | Age: 67
End: 2017-04-10

## 2017-04-10 DIAGNOSIS — E78.2 MIXED HYPERLIPIDEMIA: ICD-10-CM

## 2017-04-10 RX ORDER — PRAVASTATIN SODIUM 80 MG/1
80 TABLET ORAL DAILY
Qty: 30 TABLET | Refills: 6 | Status: SHIPPED | OUTPATIENT
Start: 2017-04-10 | End: 2018-05-24 | Stop reason: SDUPTHER

## 2017-04-10 NOTE — TELEPHONE ENCOUNTER
Please let him know labs all ok other than cholesterol is higher than ideal    He needs to increase the pravastatin to 80; I sent higher dose of pravachol  80 to pharmacy    We will recheck labs in 3 months- orders in, please schedule

## 2017-09-13 ENCOUNTER — TELEPHONE (OUTPATIENT)
Dept: INTERNAL MEDICINE | Facility: CLINIC | Age: 67
End: 2017-09-13

## 2017-09-13 DIAGNOSIS — E78.2 MIXED HYPERLIPIDEMIA: Primary | ICD-10-CM

## 2017-09-13 DIAGNOSIS — E55.9 VITAMIN D DEFICIENCY DISEASE: ICD-10-CM

## 2017-09-13 DIAGNOSIS — R93.1 AGATSTON CORONARY ARTERY CALCIUM SCORE LESS THAN 100: ICD-10-CM

## 2017-09-13 DIAGNOSIS — N40.0 BENIGN NODULAR PROSTATIC HYPERPLASIA WITHOUT LOWER URINARY TRACT SYMPTOMS: ICD-10-CM

## 2017-09-13 DIAGNOSIS — R73.01 IFG (IMPAIRED FASTING GLUCOSE): ICD-10-CM

## 2017-09-13 RX ORDER — DOXYCYCLINE 100 MG/1
100 CAPSULE ORAL 2 TIMES DAILY
Qty: 14 CAPSULE | Refills: 0 | Status: SHIPPED | OUTPATIENT
Start: 2017-09-13 | End: 2017-12-16

## 2017-09-13 NOTE — TELEPHONE ENCOUNTER
Pt stated that he have a severe cold and have been taking OTC med's with no relief, congestion . On and off fever not today , and cough specially at night time , pt cannot  Come in for an appointment  but would like a RX to be send to his Pharmacy   Please advice

## 2017-09-13 NOTE — TELEPHONE ENCOUNTER
----- Message from Caitlin Moya sent at 9/13/2017 10:08 AM CDT -----  Contact: Self/[phone]  Pt said that he is calling in regards to he has a cold and wants to know if the doctor could send a rx to the pharmacy for him. Please call and advise              Thanks!!!

## 2017-09-13 NOTE — TELEPHONE ENCOUNTER
OK I sent rx    Rest, fluids, acetaminophen, mucinex and follow up poor results.    He also needs an EPP with me around April 2018 labs prior    Please let him know and place on hold list thanks

## 2017-11-21 ENCOUNTER — OFFICE VISIT (OUTPATIENT)
Dept: OPTOMETRY | Facility: CLINIC | Age: 67
End: 2017-11-21
Payer: MEDICARE

## 2017-11-21 DIAGNOSIS — H52.4 HYPEROPIA WITH PRESBYOPIA, BILATERAL: ICD-10-CM

## 2017-11-21 DIAGNOSIS — H40.1131 PRIMARY OPEN ANGLE GLAUCOMA OF BOTH EYES, MILD STAGE: ICD-10-CM

## 2017-11-21 DIAGNOSIS — H25.13 NUCLEAR SCLEROSIS, BILATERAL: Primary | ICD-10-CM

## 2017-11-21 DIAGNOSIS — H52.03 HYPEROPIA WITH PRESBYOPIA, BILATERAL: ICD-10-CM

## 2017-11-21 PROCEDURE — 99212 OFFICE O/P EST SF 10 MIN: CPT | Mod: PBBFAC,PO | Performed by: OPTOMETRIST

## 2017-11-21 PROCEDURE — 92015 DETERMINE REFRACTIVE STATE: CPT | Mod: ,,, | Performed by: OPTOMETRIST

## 2017-11-21 PROCEDURE — 92012 INTRM OPH EXAM EST PATIENT: CPT | Mod: S$PBB,,, | Performed by: OPTOMETRIST

## 2017-11-21 PROCEDURE — 99999 PR PBB SHADOW E&M-EST. PATIENT-LVL II: CPT | Mod: PBBFAC,,, | Performed by: OPTOMETRIST

## 2017-11-21 RX ORDER — TAMSULOSIN HYDROCHLORIDE 0.4 MG/1
CAPSULE ORAL
Refills: 3 | COMMUNITY
Start: 2017-08-15 | End: 2019-01-22

## 2017-11-21 RX ORDER — LATANOPROST 50 UG/ML
1 SOLUTION/ DROPS OPHTHALMIC NIGHTLY
Qty: 2.5 ML | Refills: 12 | Status: SHIPPED | OUTPATIENT
Start: 2017-11-21 | End: 2019-04-26 | Stop reason: SDUPTHER

## 2017-11-21 RX ORDER — TAMSULOSIN HYDROCHLORIDE 0.4 MG/1
CAPSULE ORAL
COMMUNITY
Start: 2017-08-15 | End: 2018-05-24 | Stop reason: SDUPTHER

## 2017-11-21 NOTE — PROGRESS NOTES
LEANDRA LITTLEJOHN 02/2017  POAG. Using Latanaprost [facility] QHS.  Here for IOP ck today.   Distance blurred without glasses. Glasses are for reading only. Would like   refraction today.    Last edited by Erin Maldonado on 11/21/2017  8:42 AM. (History)            Assessment /Plan     For exam results, see Encounter Report.    Nuclear sclerosis, bilateral    Primary open angle glaucoma of both eyes, mild stage  -     latanoprost 0.005 % ophthalmic solution; Place 1 drop into both eyes every evening.  Dispense: 2.5 mL; Refill: 12    Hyperopia with presbyopia, bilateral      1. Educated pt on presence of cataracts and effects on vision. No surgery at this time. Recheck in one year.  2. Cont drops, IOP fine for CD ratio, no change in nerve, IOP low, prev HVf poor reliability,  Prev Oct abnormal od and normal os. Prev IOP increased due to pt out of meds. Also prev slight increase in cup right eye. May need to add a  Med in future.  Pachy thin. Prev Gonio open.  RTC 4 mos IOP ck.  Add med if worsening.   3. Spec Rx given. Different lens options discussed with patient. RTC 1 year full exam.

## 2017-12-16 ENCOUNTER — OFFICE VISIT (OUTPATIENT)
Dept: INTERNAL MEDICINE | Facility: CLINIC | Age: 67
End: 2017-12-16
Payer: MEDICARE

## 2017-12-16 VITALS
HEART RATE: 74 BPM | HEIGHT: 72 IN | BODY MASS INDEX: 29.57 KG/M2 | SYSTOLIC BLOOD PRESSURE: 118 MMHG | DIASTOLIC BLOOD PRESSURE: 68 MMHG | WEIGHT: 218.31 LBS | TEMPERATURE: 98 F

## 2017-12-16 DIAGNOSIS — J32.9 SINUSITIS, UNSPECIFIED CHRONICITY, UNSPECIFIED LOCATION: Primary | ICD-10-CM

## 2017-12-16 PROCEDURE — 99213 OFFICE O/P EST LOW 20 MIN: CPT | Mod: S$PBB,,, | Performed by: INTERNAL MEDICINE

## 2017-12-16 PROCEDURE — 99999 PR PBB SHADOW E&M-EST. PATIENT-LVL III: CPT | Mod: PBBFAC,,, | Performed by: INTERNAL MEDICINE

## 2017-12-16 PROCEDURE — 99213 OFFICE O/P EST LOW 20 MIN: CPT | Mod: PBBFAC | Performed by: INTERNAL MEDICINE

## 2017-12-16 RX ORDER — AMOXICILLIN 875 MG/1
875 TABLET, FILM COATED ORAL 2 TIMES DAILY
Qty: 14 TABLET | Refills: 0 | Status: SHIPPED | OUTPATIENT
Start: 2017-12-16 | End: 2017-12-23

## 2017-12-18 NOTE — PROGRESS NOTES
Subjective:       Patient ID: Herman Chirinos is a 67 y.o. male.    Chief Complaint: URI    HPI: He complains of sinus congestion and green nasal discharge.  No fever, chills, night sweats  Review of Systems   Constitutional: Negative for chills, fatigue, fever and unexpected weight change.   Respiratory: Negative for chest tightness and shortness of breath.    Cardiovascular: Negative for chest pain and palpitations.   Gastrointestinal: Negative for abdominal pain and blood in stool.   Neurological: Negative for dizziness, syncope, numbness and headaches.       Objective:      Physical Exam   HENT:   Right Ear: External ear normal.   Left Ear: External ear normal.   Nose: Nose normal.   Mouth/Throat: Oropharynx is clear and moist.   Eyes: Pupils are equal, round, and reactive to light.   Neck: Normal range of motion.   Cardiovascular: Normal rate and regular rhythm.    No murmur heard.  Pulmonary/Chest: Breath sounds normal.   Abdominal: He exhibits no distension. There is no hepatosplenomegaly. There is no tenderness.   Lymphadenopathy:     He has no cervical adenopathy.     He has no axillary adenopathy.   Neurological: He has normal strength and normal reflexes. No cranial nerve deficit or sensory deficit.       Assessment:     assessment and plan: Sinusitis: Amoxil 875 mg by mouth twice a day ×7 days.  Call if no relief.  He was here for an urgent care only appointment.  He will follow-up with his primary care physician for a physical      Plan:       As above

## 2018-04-08 DIAGNOSIS — N13.8 BPH WITH URINARY OBSTRUCTION: ICD-10-CM

## 2018-04-08 DIAGNOSIS — N40.1 BPH WITH URINARY OBSTRUCTION: ICD-10-CM

## 2018-04-08 RX ORDER — TAMSULOSIN HYDROCHLORIDE 0.4 MG/1
CAPSULE ORAL
Qty: 90 CAPSULE | Refills: 0 | Status: SHIPPED | OUTPATIENT
Start: 2018-04-08 | End: 2018-05-24 | Stop reason: SDUPTHER

## 2018-05-21 ENCOUNTER — LAB VISIT (OUTPATIENT)
Dept: LAB | Facility: HOSPITAL | Age: 68
End: 2018-05-21
Attending: INTERNAL MEDICINE
Payer: MEDICARE

## 2018-05-21 DIAGNOSIS — E78.2 MIXED HYPERLIPIDEMIA: ICD-10-CM

## 2018-05-21 DIAGNOSIS — E55.9 VITAMIN D DEFICIENCY DISEASE: ICD-10-CM

## 2018-05-21 DIAGNOSIS — N40.0 BENIGN NODULAR PROSTATIC HYPERPLASIA WITHOUT LOWER URINARY TRACT SYMPTOMS: ICD-10-CM

## 2018-05-21 DIAGNOSIS — R73.01 IFG (IMPAIRED FASTING GLUCOSE): ICD-10-CM

## 2018-05-21 LAB
25(OH)D3+25(OH)D2 SERPL-MCNC: 23 NG/ML
ALBUMIN SERPL BCP-MCNC: 3.6 G/DL
ALP SERPL-CCNC: 46 U/L
ALT SERPL W/O P-5'-P-CCNC: 16 U/L
ANION GAP SERPL CALC-SCNC: 4 MMOL/L
AST SERPL-CCNC: 20 U/L
AST SERPL-CCNC: 20 U/L
BASOPHILS # BLD AUTO: 0.04 K/UL
BASOPHILS NFR BLD: 0.7 %
BILIRUB SERPL-MCNC: 0.7 MG/DL
BUN SERPL-MCNC: 9 MG/DL
CALCIUM SERPL-MCNC: 8.6 MG/DL
CHLORIDE SERPL-SCNC: 109 MMOL/L
CHOLEST SERPL-MCNC: 154 MG/DL
CHOLEST/HDLC SERPL: 3.3 {RATIO}
CO2 SERPL-SCNC: 26 MMOL/L
COMPLEXED PSA SERPL-MCNC: 2.5 NG/ML
CREAT SERPL-MCNC: 0.8 MG/DL
DIFFERENTIAL METHOD: ABNORMAL
EOSINOPHIL # BLD AUTO: 0.1 K/UL
EOSINOPHIL NFR BLD: 2 %
ERYTHROCYTE [DISTWIDTH] IN BLOOD BY AUTOMATED COUNT: 14.7 %
EST. GFR  (AFRICAN AMERICAN): >60 ML/MIN/1.73 M^2
EST. GFR  (NON AFRICAN AMERICAN): >60 ML/MIN/1.73 M^2
ESTIMATED AVG GLUCOSE: 120 MG/DL
GLUCOSE SERPL-MCNC: 104 MG/DL
HBA1C MFR BLD HPLC: 5.8 %
HCT VFR BLD AUTO: 41.4 %
HDLC SERPL-MCNC: 46 MG/DL
HDLC SERPL: 29.9 %
HGB BLD-MCNC: 14.1 G/DL
LDLC SERPL CALC-MCNC: 94.2 MG/DL
LYMPHOCYTES # BLD AUTO: 1.2 K/UL
LYMPHOCYTES NFR BLD: 21.4 %
MCH RBC QN AUTO: 29.5 PG
MCHC RBC AUTO-ENTMCNC: 34.1 G/DL
MCV RBC AUTO: 87 FL
MONOCYTES # BLD AUTO: 0.5 K/UL
MONOCYTES NFR BLD: 9.1 %
NEUTROPHILS # BLD AUTO: 3.7 K/UL
NEUTROPHILS NFR BLD: 66.4 %
NONHDLC SERPL-MCNC: 108 MG/DL
PLATELET # BLD AUTO: 218 K/UL
PMV BLD AUTO: 9.3 FL
POTASSIUM SERPL-SCNC: 3.8 MMOL/L
PROT SERPL-MCNC: 6.6 G/DL
RBC # BLD AUTO: 4.78 M/UL
SODIUM SERPL-SCNC: 139 MMOL/L
TRIGL SERPL-MCNC: 69 MG/DL
WBC # BLD AUTO: 5.62 K/UL

## 2018-05-21 PROCEDURE — 82306 VITAMIN D 25 HYDROXY: CPT

## 2018-05-21 PROCEDURE — 84153 ASSAY OF PSA TOTAL: CPT

## 2018-05-21 PROCEDURE — 83036 HEMOGLOBIN GLYCOSYLATED A1C: CPT

## 2018-05-21 PROCEDURE — 85025 COMPLETE CBC W/AUTO DIFF WBC: CPT

## 2018-05-21 PROCEDURE — 80053 COMPREHEN METABOLIC PANEL: CPT

## 2018-05-21 PROCEDURE — 80061 LIPID PANEL: CPT

## 2018-05-21 PROCEDURE — 36415 COLL VENOUS BLD VENIPUNCTURE: CPT

## 2018-05-24 ENCOUNTER — TELEPHONE (OUTPATIENT)
Dept: INTERNAL MEDICINE | Facility: CLINIC | Age: 68
End: 2018-05-24

## 2018-05-24 ENCOUNTER — OFFICE VISIT (OUTPATIENT)
Dept: INTERNAL MEDICINE | Facility: CLINIC | Age: 68
End: 2018-05-24
Payer: MEDICARE

## 2018-05-24 ENCOUNTER — TELEPHONE (OUTPATIENT)
Dept: INTERNAL MEDICINE | Facility: CLINIC | Age: 68
End: 2018-05-24
Payer: MEDICARE

## 2018-05-24 VITALS
BODY MASS INDEX: 30.16 KG/M2 | SYSTOLIC BLOOD PRESSURE: 125 MMHG | WEIGHT: 222.69 LBS | HEIGHT: 72 IN | DIASTOLIC BLOOD PRESSURE: 75 MMHG

## 2018-05-24 DIAGNOSIS — E55.9 VITAMIN D DEFICIENCY DISEASE: ICD-10-CM

## 2018-05-24 DIAGNOSIS — Z12.5 ENCOUNTER FOR SCREENING FOR MALIGNANT NEOPLASM OF PROSTATE: ICD-10-CM

## 2018-05-24 DIAGNOSIS — R93.1 AGATSTON CORONARY ARTERY CALCIUM SCORE LESS THAN 100: Primary | ICD-10-CM

## 2018-05-24 DIAGNOSIS — N40.0 BENIGN NODULAR PROSTATIC HYPERPLASIA WITHOUT LOWER URINARY TRACT SYMPTOMS: ICD-10-CM

## 2018-05-24 DIAGNOSIS — R73.01 IFG (IMPAIRED FASTING GLUCOSE): ICD-10-CM

## 2018-05-24 DIAGNOSIS — M48.061 SPINAL STENOSIS OF LUMBAR REGION WITHOUT NEUROGENIC CLAUDICATION: ICD-10-CM

## 2018-05-24 DIAGNOSIS — E66.09 NON MORBID OBESITY DUE TO EXCESS CALORIES: ICD-10-CM

## 2018-05-24 DIAGNOSIS — D12.6 TUBULAR ADENOMA OF COLON: ICD-10-CM

## 2018-05-24 DIAGNOSIS — Z23 NEED FOR DIPHTHERIA, TETANUS, PERTUSSIS, AND HIB VACCINATION: Primary | ICD-10-CM

## 2018-05-24 DIAGNOSIS — M17.0 OSTEOARTHRITIS OF BOTH KNEES, UNSPECIFIED OSTEOARTHRITIS TYPE: ICD-10-CM

## 2018-05-24 DIAGNOSIS — E78.2 MIXED HYPERLIPIDEMIA: ICD-10-CM

## 2018-05-24 PROCEDURE — 99999 PR PBB SHADOW E&M-EST. PATIENT-LVL III: CPT | Mod: PBBFAC,,, | Performed by: INTERNAL MEDICINE

## 2018-05-24 PROCEDURE — 99214 OFFICE O/P EST MOD 30 MIN: CPT | Mod: 25,S$GLB,, | Performed by: INTERNAL MEDICINE

## 2018-05-24 PROCEDURE — 99213 OFFICE O/P EST LOW 20 MIN: CPT | Mod: PBBFAC | Performed by: INTERNAL MEDICINE

## 2018-05-24 RX ORDER — PRAVASTATIN SODIUM 80 MG/1
80 TABLET ORAL DAILY
Qty: 30 TABLET | Refills: 6 | Status: SHIPPED | OUTPATIENT
Start: 2018-05-24 | End: 2019-05-21 | Stop reason: SDUPTHER

## 2018-05-24 RX ORDER — VIT C/E/ZN/COPPR/LUTEIN/ZEAXAN 250MG-90MG
2000 CAPSULE ORAL DAILY
Qty: 60 CAPSULE | Refills: 12 | Status: SHIPPED | OUTPATIENT
Start: 2018-05-24 | End: 2020-11-02

## 2018-05-24 NOTE — PATIENT INSTRUCTIONS
Exercises to Strengthen Your Lower Back  Strong lower back and abdominal muscles work together to support your spine. The exercises below will help strengthen the lower back. It is important that you begin exercising slowly and increase levels gradually.  Always begin any exercise program with stretching. If you feel pain while doing any of these exercises, stop and talk to your doctor about a more specific exercise program that better suits your condition.   Low back stretch  The point of stretching is to make you more flexible and increase your range of motion. Stretch only as much as you are able. Stretch slowly. Do not push your stretch to the limit. If at any point you feel pain while stretching, this is your (temporary) limit.  · Lie on your back with your knees bent and both feet on the ground.  · Slowly raise your left knee to your chest as you flatten your lower back against the floor. Hold for 5 seconds.  · Relax and repeat the exercise with your right knee.  · Do 10 of these exercises for each leg.  · Repeat hugging both knees to your chest at the same time.  Building lower back strength  Start your exercise routine with 10 to 30 minutes a day, 1 to 3 times a day.  Initial exercises  Lying on your back:  1. Ankle pumps: Move your foot up and down, towards your head, and then away. Repeat 10 times with each foot.  2. Heel slides: Slowly bend your knee, drawing the heel of your foot towards you. Then slide your heel/foot from you, straightening your knee. Do not lift your foot off the floor (this is not a leg lift).  3. Abdominal contraction: Bend your knees and put your hands on your stomach. Tighten your stomach muscles. Hold for 5 seconds, then relax. Repeat 10 times.  4. Straight leg raise: Bend one leg at the knee and keep the other leg straight. Tighten your stomach muscles. Slowly lift your straight leg 6 to 12 inches off the floor and hold for up to 5 seconds. Repeat 10 times on each  side.  Standin. Wall squats: Stand with your back against the wall. Move your feet about 12 inches away from the wall. Tighten your stomach muscles, and slowly bend your knees until they are at about a 45 degree angle. Do not go down too far. Hold about 5 seconds. Then slowly return to your starting position. Repeat 10 times.  2. Heel raises: Stand facing the wall. Slowly raise the heels of your feet up and down, while keeping your toes on the floor. If you have trouble balancing, you can touch the wall with your hands. Repeat 10 times.  More advanced exercises  When you feel comfortable enough, try these exercises.  1. Kneeling lumbar extension: Begin on your hands and knees. At the same time, raise and straighten your right arm and left leg until they are parallel to the ground. Hold for 2 seconds and come back slowly to a starting position. Repeat with left arm and right leg, alternating 10 times.  2. Prone lumbar extension: Lie face down, arms extended overhead, palms on the floor. At the same time, raise your right arm and left leg as high as comfortably possible. Hold for 10 seconds and slowly return to start. Repeat with left arm and right leg, alternating 10 times. Gradually build up to 20 times. (Advanced: Repeat this exercise raising both arms and both legs a few inches off the floor at the same time. Hold for 5 seconds and release.)  3. Pelvic tilt: Lie on the floor on your back with your knees bent at 90 degrees. Your feet should be flat on the floor. Inhale, exhale, then slowly contract your abdominal muscles bringing your navel toward your spine. Let your pelvis rock back until your lower back is flat on the floor. Hold for 10 seconds while breathing smoothly.  4. Abdominal crunch: Perform a pelvic tilt (above) flattening your lower back against the floor. Holding the tension in your abdominal muscles, take another breath and raise your shoulder blades off the ground (this is not a full sit-up).  Keep your head in line with your body (dont bend your neck forward). Hold for 2 seconds, then slowly lower.  Date Last Reviewed: 6/1/2016  © 5545-0612 [employer]. [address] [place] [address]. All rights reserved. This information is not intended as a substitute for professional medical care. Always follow your healthcare professional's instructions.        Back Exercises: Abdominal Lift Brace with Marching    The abdominal lift brace with march strengthens your lower abdominal muscles, helping you keep your pelvis and back stable:  · Lie on the floor with both knees bent. Put your feet flat on the floor and your arms by your sides. Tighten your abdominal muscles. Be sure to continue to breathe.  · Lift one bent knee about 2 inches then return it to the floor and lift the other about 2 inches. Keep your abdominal muscles tight and continue to breathe. These motions should be slow and controlled without your pelvis rocking side to side.  · Repeat 10 times.  Date Last Reviewed: 8/16/2015  © 9492-2458 [employer]. [address] [place] [address]. All rights reserved. This information is not intended as a substitute for professional medical care. Always follow your healthcare professional's instructions.        Back Exercises: Arm Reach    Do this exercise on your hands and knees. Keep your knees under your hips and your hands under your shoulders. Keep your spine in a neutral position (not arched or sagging). Be sure to maintain your necks natural curve:  · Stretch one arm straight out in front of you. Dont raise your head or let your supporting shoulder sag.  · Hold for 5 seconds.  · Return to starting position.  · Repeat 5 times.  · Switch arms.  Date Last Reviewed: 8/16/2015  © 1375-6601 [employer]. [address] [place] [address]. All rights reserved. This information is not intended as a substitute for professional medical care. Always  follow your healthcare professional's instructions.        Back Exercises: Back Press    Do this exercise on your hands and knees. Keep your knees under your hips and your hands under your shoulders. Keep your spine in a neutral position (not arched or sagging). Be sure to maintain your necks natural curve:  · Tighten your stomach and buttock muscles to press your back upward. Let your head drop slightly.  · Hold for 5 seconds. Return to starting position.  · Repeat 5 times.  Date Last Reviewed: 10/11/2015  © 0853-4141 [employer]. [address] [place] [address]. All rights reserved. This information is not intended as a substitute for professional medical care. Always follow your healthcare professional's instructions.        Back Exercises: Bridge  The bridge exercise strengthens your abdominal, buttock, and hamstring muscles. This helps keep your back stable and aligned when you walk.  · Lie on the floor with your back and palms flat. Bend your knees. Keep your feet flat on the floor.  · Contract your abdominal and buttock muscles. Slowly lift your buttocks off the floor until there is a straight line from your knees to your shoulders.  · Hold for 5 to 15  seconds. Repeat 5 to10 times.    Date Last Reviewed: 8/31/2015  © 9313-2380 [employer]. [address] [place] [address]. All rights reserved. This information is not intended as a substitute for professional medical care. Always follow your healthcare professional's instructions.        Back Exercises: Hip Rotator Stretch    To start, lie on your back with your knees bent and feet flat on the floor. Dont press your neck or lower back to the floor. Breathe deeply. You should feel comfortable and relaxed in this position.  · Rest your right ankle on your left knee.  · Place a towel behind your left thigh, and use it to pull the knee toward your chest. Feel the stretch in your buttocks.  · Hold for 30 to 60  seconds. Release.  · Repeat 2 times.  · Switch legs.   · If there is any pain other than stretch in the knee or buttock, stop and contact your healthcare provider.  For your safety, check with your healthcare provider before starting an exercise program.   Date Last Reviewed: 8/16/2015  © 9143-5843 [employer]. [address] [place] [address]. All rights reserved. This information is not intended as a substitute for professional medical care. Always follow your healthcare professional's instructions.        Back Exercises: Knee Lift         To start, lie on your back with your knees bent and feet flat on the floor. Dont press your neck or lower back to the floor. Breathe deeply. You should feel comfortable and relaxed in this position:  · Start by tightening your abdominal muscles.  · Lift one bent knee off the floor 2 to 4 inches.  · Hold for 10 seconds. Return to start position.  · Repeat 3 times.  · Switch legs.  Date Last Reviewed: 8/16/2015  © 6558-8733 [employer]. [address] [place] [address]. All rights reserved. This information is not intended as a substitute for professional medical care. Always follow your healthcare professional's instructions.        Hamstring Stretch    Begin your rehabilitation with exercises that develop muscle control. These help you meet basic goals, like driving a car or going back to work. Exercise as often as youre advised. But stop right away if any exercise causes sharp or increasing pain. Icing your knee for 15 to 20 minutes after exercise can help prevent swelling and soreness.  · Lie on your back with your good knee bent. Put a towel around the back of your injured leg. Tighten your stomach muscles.  · Keeping the knee as straight as you can, slowly pull on the towel to bring your injured leg up. Raise it as far as you comfortably can.  · Hold for 30 to 60 seconds. Repeat 2 to 3 times.   Caution: If you feel tingling or  pain in your back or legs, youre not yet ready for this exercise or are pulling too aggressively.   For your safety, check with your healthcare provider before starting an exercise program.   Date Last Reviewed: 8/16/2015 © 2000-2016 [employer]. [address] [place] [address]. All rights reserved. This information is not intended as a substitute for professional medical care. Always follow your healthcare professional's instructions.        Hamstring Stretch (with Towel)    To start, lie on your back with your knees bent and feet flat on the floor. Dont press your neck or lower back to the floor. Breathe deeply. You should feel comfortable and relaxed in this position.  · Put a towel behind one knee or calf.  · Use the towel to pull the leg toward your chest, keeping the leg straight or slightly bent.  · Hold for 30-60 seconds. Then lower the leg.  · Repeat 2 times.  · Switch legs.   For your safety, check with your healthcare provider before starting an exercise program.   Date Last Reviewed: 8/16/2015 © 2000-2017 [employer]. [address] [place] [address]. All rights reserved. This information is not intended as a substitute for professional medical care. Always follow your healthcare professional's instructions.        Knee Pain  Knee pain is very common. Its especially common in active people who put a lot of pressure on their knees, like runners. It affects women more often than men.  Your kneecap (patella) is a thick, round bone. It covers and protects the front portion of your knee joint. It moves along a groove in your thighbone (femur) as part of the patellofemoral joint. A layer of cartilage surrounds the underside of your kneecap. This layer protects it from grinding against your femur.  When this cartilage softens and breaks down, it can cause knee pain. This is partly because of repetitive stress. The stress irritates the lining of the joint. This  causes pain in the underlying bone.  What causes knee pain?  Many things can cause knee pain. You may have more than one cause. Some of these include:  · Overuse of the knee joint  · The kneecap doesnt line up with the tissue around it  · Damage to small nerves in the area  · Damage to the ligament-like structure that holds the kneecap in place (retinaculum)  · Breakdown of the bone under the cartilage  · Swelling in the soft tissues around the kneecap  · Injury  You might be more likely to have knee pain if you:  · Exercise a lot  · Recently increased the intensity of your workouts  · Have a body mass index (BMI) greater than 25  · Have poor alignment of your kneecap  · Walk with your feet turned overly outward or inward  · Have weakness in surrounding muscle groups (inner quad or hip adductor muscles)  · Have too much tightness in surrounding muscle groups (hamstrings or iliotibial band)  · Have a recent history of injury to the area  · Are female  Symptoms of knee pain  This type of knee pain is a dull, aching pain in the front of the knee in the area under and around the kneecap. This pain may start quickly or slowly. Your pain might be worse when you squat, run, or sit for a long time. You might also sometimes feel like your knee is giving out. You may have symptoms in one or both of your knees.  Diagnosing knee pain  Your healthcare provider will ask about your medical history and your symptoms. Be sure to describe any activities that make your knee pain worse. He or she will look at your knee. This will include tests of your range of motion, strength, and areas of pain of your knee. Your knee alignment will be checked.  Your healthcare provider will need to rule out other causes of your knee pain, such as arthritis. You may need an imaging test, such as an X-ray or MRI.  Treatment for knee pain  Treatments that can help ease your symptoms may include:  · Avoiding activities for a while that make your pain  worse, returning to activity over time  · Icing the outside of your knee when it causes you pain  · Taking over-the-counter pain medicine  · Wearing a knee brace or taping your knee to support it  · Wearing special shoe inserts to help keep your feet in the proper alignment  · Doing special exercises to stretch and strengthen the muscles around your hip and your knee  These steps help most people manage knee pain. But some cases of knee pain need to be treated with surgery. You may need surgery right away. Or you may need it later if other treatments dont work. Your healthcare provider may refer you to an orthopedic surgeon. He or she will talk with you about your choices.  Preventing knee pain  Losing weight and correcting excess muscle tightness or muscle weakness may help lower your risk.  In some cases, you can prevent knee pain. To help prevent a flare-up of knee pain, you do these things:  · Regularly do all the exercises your doctor or physical therapist advises  · Support your knee as advised by your doctor or physical therapist  · Increase training gradually, and ease up on training when needed  · Have an expert check your gait for running or other sporting activities  · Stretch properly before and after exercise  · Replace your running shoes regularly  · Lose excess weight     When to call your healthcare provider  Call your healthcare provider right away if:  · Your symptoms dont get better after a few weeks of treatment  · You have any new symptoms   Date Last Reviewed: 4/1/2017  © 5589-8870 [employer]. [address] [place] [address]. All rights reserved. This information is not intended as a substitute for professional medical care. Always follow your healthcare professional's instructions.        Reducing Knee Pain and Swelling    Many treatments can help reduce pain and swelling in your knee. Your healthcare provider or physical therapist may suggest one or more of the  following treatments:  · Icing your knee helps reduce swelling. You may be asked to ice your knee once a day or more. Apply ice for about 15 to 20 minutes at a time, with at least 40 minutes between sessions. Always keep a towel between the ice and your skin.   · Keeping your leg raised above your heart helps excess fluid flow out of your knee joint. This reduces swelling.  · Compression means wrapping an elastic bandage or neoprene sleeve snugly around your knees. This keeps fluid from collecting in your knee joint.  · Electrical stimulation, done by a physical therapist or , can help reduce excess fluid in your knee joint.  · Anti-inflammatory medicines may be prescribed by your healthcare provider. You may take pills or receive injections in your knee.  · Isometric (mayda) exercises strengthen the muscles that support your knee joint. They also help reduce excess fluid in your knee.  · Massage helps fluid drain away from your knee.  Date Last Reviewed: 10/13/2015  © 6092-4201 [employer]. [address] [place] [address]. All rights reserved. This information is not intended as a substitute for professional medical care. Always follow your healthcare professional's instructions.        Understanding Osteoarthritis of the Knee    A joint is a place where two bones meet. The knee is called a hinge joint. This joint is formed where the thighbone (femur) meets the shinbone (tibia). A healthy knee joint bends freely. Knee osteoarthritis is a condition where parts of the knee joint wear out. This can lead to pain, stiffness, and limited movement.   What is osteoarthritis?  Every joint contains a smooth tissue called cartilage. Cartilage cushions the ends of bones and helps bones in a joint glide smoothly against each other. Knee osteoarthritis occurs when cartilage in the knee joint begins to break down and wear away. Bones may become exposed and rub together. The cartilage  may become irritated and rough. This prevents smooth movement of the joint and can lead to pain.  Causes of osteoarthritis of the knee  Causes can include:  · Wear and tear from normal use over time  · Overuse of the knee during sports or work activities  · Being overweight. This increases stress on the knee joint.  · Misalignment of the knee joint  · Injury to the knee  Symptoms of osteoarthritis of the knee  Common symptoms include:  · Pain and swelling around the joint. The pain and swelling get worse with activity and better with rest.  · Grinding sound when moving the knee  · Reduced knee movement  · Knee stiffness. This is often worse first thing in the morning.  Treating osteoarthritis of the knee  Osteoarthritis is a long-term condition. Treatment usually focuses on managing symptoms. Treatment may include:  · Over-the-counter or prescription medicines taken by mouth to help relieve pain and swelling  · Injections of medicine into the joint to help relieve symptoms for a time  · A weight-loss plan for people who are overweight  · A plan of physical therapy and exercises to improve the strength and flexibility of the muscles around the knee  · Heat or cold therapy to help relieve pain and stiffness  · Assistive devices that help with movement, such as a cane or a walker  · Assistive devices that make activities of daily life easier, such as raised toilet seats or shower bars  If other treatments dont do enough to relieve symptoms, you may need surgery to replace the joint. During this surgery, the damaged joint is removed. An artificial knee joint is then put into place. This can help relieve pain and stiffness and restore movement of the knee.     When to call your healthcare provider  Call your healthcare provider right away if you have any of these:  · Fever of 100.4°F (38°C) or higher, or as directed  · Symptoms that dont get better with prescribed medicines or get worse  · New symptoms   Date Last  Reviewed: 3/10/2016  © 7091-3332 [employer]. [address] [place] [address], [place] [address]. All rights reserved. This information is not intended as a substitute for professional medical care. Always follow your healthcare professional's instructions.

## 2018-05-24 NOTE — PROGRESS NOTES
Subjective:       Patient ID: Herman Chirinos is a 67 y.o. male.    Chief Complaint: Coronary Artery Disease; Hyperlipidemia; Impaired Fasting Glucose; and Benign Prostatic Hypertrophy    Multiple issues    Labs all stable    Weight discussed; recommended 10-15# weight loss in next year.    Some ongoing back pain, has L spine DJD    Knee pain L > R.    IFG and lipids stable.    Immunizations also discussed.    No chest pain or tightness, no syncope.  CAD reviewed.    Patient Active Problem List:     Chronic rhinitis     Mixed hyperlipidemia     POAG (primary open-angle glaucoma) - Both Eyes     Dry eyes - Both Eyes     Nuclear sclerosis     Erectile dysfunction     [facility] less than 100     IFG (impaired fasting glucose)     Non morbid obesity due to excess calories     Tubular adenoma of colon: 4/16 repeat 2021     Benign nodular prostatic hyperplasia without lower urinary tract symptoms     Lumbar spinal stenosis        Review of Systems   Constitutional: Negative.    HENT: Negative for congestion, postnasal drip, rhinorrhea and sinus pressure.    Eyes: Negative for redness and visual disturbance.   Respiratory: Negative for apnea, choking, shortness of breath and stridor.    Cardiovascular: Negative for chest pain, palpitations and leg swelling.   Gastrointestinal: Negative for abdominal pain, constipation, diarrhea and nausea.   Genitourinary: Negative for difficulty urinating, flank pain, frequency, testicular pain and urgency.   Musculoskeletal: Positive for arthralgias and back pain. Negative for myalgias.   Skin: Negative for color change and rash.   Neurological: Negative.    Psychiatric/Behavioral: Negative.        Objective:      Physical Exam   Constitutional: He is oriented to person, place, and time. He appears well-developed and well-nourished.   HENT:   Head: Normocephalic and atraumatic.   Right Ear: External ear normal.   Left Ear: External ear normal.   Eyes:  Conjunctivae and EOM are normal. Pupils are equal, round, and reactive to light.   Neck: Normal range of motion. Neck supple. No thyromegaly present.   Cardiovascular: Normal rate and regular rhythm.    No murmur heard.  Pulmonary/Chest: Effort normal and breath sounds normal. No respiratory distress. He has no wheezes.   Abdominal: Soft. He exhibits no distension. There is no tenderness.   Musculoskeletal: He exhibits no edema or tenderness.   Negative SLR  No pain over back  No effusion either knee  Effusion- small- L elbow   Lymphadenopathy:     He has no cervical adenopathy.   Neurological: He is alert and oriented to person, place, and time. No cranial nerve deficit.   Skin: Skin is warm and dry.   Psychiatric: He has a normal mood and affect. His behavior is normal.       Assessment:       1. Agatston coronary artery calcium score less than 100    2. Mixed hyperlipidemia    3. Benign nodular prostatic hyperplasia without lower urinary tract symptoms    4. IFG (impaired fasting glucose)    5. Non morbid obesity due to excess calories    6. Tubular adenoma of colon: 4/16 repeat 2021    7. Spinal stenosis of lumbar region: see MRI 2006    8. Osteoarthritis of both knees, unspecified osteoarthritis type    9. Vitamin D deficiency disease    10. Encounter for screening for malignant neoplasm of prostate        Plan:         Agatston coronary artery calcium score less than 100:  Stable without symptoms.  Continue current medical regimen    Mixed hyperlipidemia:  Continue current regimen  -     pravastatin (PRAVACHOL) 80 MG tablet; Take 1 tablet (80 mg total) by mouth once daily.  Dispense: 30 tablet; Refill: 6  -     CBC auto differential; Future; Expected date: 05/24/2019  -     Comprehensive metabolic panel; Future; Expected date: 05/24/2019  -     Lipid panel; Future; Expected date: 05/24/2019    Benign nodular prostatic hyperplasia without lower urinary tract symptoms:  No alarm symptoms, follow up poor  results    IFG (impaired fasting glucose):  Hydration, diet and lifestyle issues reviewed  -     Hemoglobin A1c; Future; Expected date: 05/24/2019    Non morbid obesity due to excess calories:  Keep working on exercise, lifestyle modification, portion control, goal of 5-10 lb weight loss in the next year  -     Ambulatory Referral to [facility] ([email or web address])    Tubular adenoma of colon: 4/16 repeat 2021    Spinal stenosis of lumbar region: see MRI 2006: Ice, Tylenol, consider physical therapy and/or Orthopedics depending on progress he makes with exercise and weight loss and conservative therapy.  Handouts given  -     Ambulatory Referral to [facility] ([facility])  -     [facility] ASSIGN QUESTIONNAIRE SERIES ([email or web address])  -     [facility] ([email or web address])    Osteoarthritis of both knees, unspecified osteoarthritis type:  Ice, Tylenol, consider physical therapy and/or Orthopedics depending on progress he makes with exercise and weight loss and conservative therapy.  Handouts given    Vitamin D deficiency disease  -     Vitamin D; Future; Expected date: 05/24/2019    Encounter for screening for malignant neoplasm of prostate  -     PSA, Screening; Future; Expected date: 05/24/2019    Other orders  -     Tdap Vaccine  -     (In Office Administered) Zoster Recombinant Vaccine  -     cholecalciferol, vitamin D3, 1,000 unit capsule; Take 2 capsules (2,000 Units total) by mouth once daily.  Dispense: 60 capsule; Refill: 12    I will review all studies and determine further tx depending on findings  Annual follow up

## 2018-07-11 DIAGNOSIS — N13.8 BPH WITH URINARY OBSTRUCTION: ICD-10-CM

## 2018-07-11 DIAGNOSIS — N40.1 BPH WITH URINARY OBSTRUCTION: ICD-10-CM

## 2018-07-11 RX ORDER — TAMSULOSIN HYDROCHLORIDE 0.4 MG/1
CAPSULE ORAL
Qty: 90 CAPSULE | Refills: 0 | Status: SHIPPED | OUTPATIENT
Start: 2018-07-11 | End: 2019-01-21 | Stop reason: SDUPTHER

## 2019-01-11 ENCOUNTER — OFFICE VISIT (OUTPATIENT)
Dept: INTERNAL MEDICINE | Facility: CLINIC | Age: 69
End: 2019-01-11
Payer: MEDICARE

## 2019-01-11 ENCOUNTER — IMMUNIZATION (OUTPATIENT)
Dept: INTERNAL MEDICINE | Facility: CLINIC | Age: 69
End: 2019-01-11
Payer: MEDICARE

## 2019-01-11 VITALS
DIASTOLIC BLOOD PRESSURE: 80 MMHG | HEIGHT: 72 IN | SYSTOLIC BLOOD PRESSURE: 105 MMHG | WEIGHT: 222.69 LBS | BODY MASS INDEX: 30.16 KG/M2 | OXYGEN SATURATION: 98 % | HEART RATE: 76 BPM

## 2019-01-11 DIAGNOSIS — R42 DIZZINESS: Primary | ICD-10-CM

## 2019-01-11 DIAGNOSIS — Z23 INFLUENZA VACCINATION ADMINISTERED AT CURRENT VISIT: ICD-10-CM

## 2019-01-11 PROCEDURE — 99999 PR PBB SHADOW E&M-EST. PATIENT-LVL III: ICD-10-PCS | Mod: PBBFAC,GC,, | Performed by: STUDENT IN AN ORGANIZED HEALTH CARE EDUCATION/TRAINING PROGRAM

## 2019-01-11 PROCEDURE — 99999 PR PBB SHADOW E&M-EST. PATIENT-LVL III: CPT | Mod: PBBFAC,GC,, | Performed by: STUDENT IN AN ORGANIZED HEALTH CARE EDUCATION/TRAINING PROGRAM

## 2019-01-11 PROCEDURE — 99213 OFFICE O/P EST LOW 20 MIN: CPT | Mod: S$PBB,GC,, | Performed by: STUDENT IN AN ORGANIZED HEALTH CARE EDUCATION/TRAINING PROGRAM

## 2019-01-11 PROCEDURE — 90662 IIV NO PRSV INCREASED AG IM: CPT | Mod: PBBFAC

## 2019-01-11 PROCEDURE — 99213 PR OFFICE/OUTPT VISIT, EST, LEVL III, 20-29 MIN: ICD-10-PCS | Mod: S$PBB,GC,, | Performed by: STUDENT IN AN ORGANIZED HEALTH CARE EDUCATION/TRAINING PROGRAM

## 2019-01-11 PROCEDURE — 99213 OFFICE O/P EST LOW 20 MIN: CPT | Mod: PBBFAC,25 | Performed by: STUDENT IN AN ORGANIZED HEALTH CARE EDUCATION/TRAINING PROGRAM

## 2019-01-11 NOTE — PROGRESS NOTES
Clinic Note  2019      Subjective:       Patient ID:  Herman is a 68 y.o. male being seen for an established visit.    Chief Complaint: Dizziness (after getting up from the bed in the mornings or sometimes after standing )    Herman Chirinos is a 68 year old male with HLD, BPH, and ED who presents with a 1 week history of dizziness. The dizziness occurs in the mornings after standing up from bed. It lasts a few seconds to a minute. Patient has started to take his time when getting up out of bed. The dizziness is not positional. This has never happened before. Patient reports being well hydrated but his urine has been dark yellow. Patient takes Flomax after breakfast. Has not taken Viagra lately as his prescription is . Is not on blood pressure medications. Patient denies chest pain, shortness of breath, headaches, changes in vision, and ear aches.           Review of Systems   Constitutional: Negative for chills, fever and weight loss.   HENT: Negative for congestion, ear pain and hearing loss.    Eyes: Negative for blurred vision and double vision.   Respiratory: Negative for cough, sputum production and shortness of breath.    Cardiovascular: Negative for chest pain.   Gastrointestinal: Negative for abdominal pain, diarrhea, nausea and vomiting.   Genitourinary: Negative for dysuria, flank pain, hematuria and urgency.   Musculoskeletal: Negative.    Skin: Negative for itching and rash.   Neurological: Positive for dizziness. Negative for sensory change, speech change, focal weakness, seizures, loss of consciousness and headaches.   Psychiatric/Behavioral: Negative.        Past Medical History:   Diagnosis Date    Appendicitis     Benign neoplasm of colon     Cataract     Chronic rhinitis     Colon polyps     Glaucoma     Hyperlipidemia     IFG (impaired fasting glucose) 2016    Lumbar stenosis     Osteoarthritis of both knees 2018    Tubular adenoma of colon:  repeat   4/7/2017       Family History   Problem Relation Age of Onset    Coronary artery disease Mother     Hypertension Mother     Heart disease Mother         heart transplant    Diabetes Mellitus Father     Diabetes Father     Kidney disease Sister     No Known Problems Brother     No Known Problems Daughter     No Known Problems Sister     No Known Problems Brother     No Known Problems Brother     No Known Problems Son     No Known Problems Son     No Known Problems Son     No Known Problems Maternal Aunt     No Known Problems Maternal Uncle     No Known Problems Paternal Aunt     No Known Problems Paternal Uncle     No Known Problems Maternal Grandmother     No Known Problems Maternal Grandfather     No Known Problems Paternal Grandmother     No Known Problems Paternal Grandfather     No Known Problems Daughter     No Known Problems Daughter     Cancer Neg Hx     Stroke Neg Hx     Glaucoma Neg Hx     Cataracts Neg Hx     Blindness Neg Hx     Amblyopia Neg Hx     Macular degeneration Neg Hx     Retinal detachment Neg Hx     Strabismus Neg Hx     Thyroid disease Neg Hx     Psoriasis Neg Hx         reports that  has never smoked. he has never used smokeless tobacco. He reports that he does not drink alcohol or use drugs.       Medication List           Accurate as of 1/11/19  2:27 PM. If you have any questions, ask your nurse or doctor.               CONTINUE taking these medications    aspirin 81 MG Chew  Take 1 tablet (81 mg total) by mouth once daily.     cholecalciferol (vitamin D3) 1,000 unit capsule  Commonly known as:  VITAMIN D3  Take 2 capsules (2,000 Units total) by mouth once daily.     fluticasone 50 mcg/actuation nasal spray  Commonly known as:  FLONASE  USE 1 SPRAY IN EACH NOSTRIL ONCE DAILY     latanoprost 0.005 % ophthalmic solution  Place 1 drop into both eyes every evening.     loratadine 10 mg tablet  Commonly known as:  CLARITIN  Take 1 tablet (10 mg total) by mouth  once daily.     pravastatin 80 MG tablet  Commonly known as:  PRAVACHOL  Take 1 tablet (80 mg total) by mouth once daily.     sildenafil 50 MG tablet  Commonly known as:  VIAGRA  Take 1 tablet (50 mg total) by mouth daily as needed for Erectile Dysfunction.     * tamsulosin 0.4 mg Cap  Commonly known as:  FLOMAX     * tamsulosin 0.4 mg Cap  Commonly known as:  FLOMAX  TAKE 1 CAPSULE(0.4 MG) BY MOUTH EVERY EVENING         * This list has 2 medication(s) that are the same as other medications prescribed for you. Read the directions carefully, and ask your doctor or other care provider to review them with you.              Review of patient's allergies indicates:  No Known Allergies    Patient Active Problem List   Diagnosis    Chronic rhinitis    Mixed hyperlipidemia    POAG (primary open-angle glaucoma) - Both Eyes    Dry eyes - Both Eyes    Nuclear sclerosis    Erectile dysfunction    Agatston coronary artery calcium score less than 100    IFG (impaired fasting glucose)    Non morbid obesity due to excess calories    Tubular adenoma of colon: 4/16 repeat 2021    Benign nodular prostatic hyperplasia without lower urinary tract symptoms    Spinal stenosis of lumbar region: see MRI 2006    Osteoarthritis of both knees           Objective:      /80 (BP Location: Right arm, Patient Position: Standing)   Pulse 76   Ht 6' (1.829 m)   Wt 101 kg (222 lb 10.6 oz)   SpO2 98%   BMI 30.20 kg/m²   Estimated body mass index is 30.2 kg/m² as calculated from the following:    Height as of 5/24/18: 6' (1.829 m).    Weight as of 5/24/18: 101 kg (222 lb 10.6 oz).      Physical Exam   Constitutional: He is oriented to person, place, and time and well-developed, well-nourished, and in no distress. No distress.   HENT:   Head: Normocephalic and atraumatic.   Right Ear: External ear normal.   Left Ear: External ear normal.   Nose: Nose normal.   Mouth/Throat: Oropharynx is clear and moist. No oropharyngeal exudate.    Eyes: EOM are normal. Pupils are equal, round, and reactive to light. Right eye exhibits no discharge. Left eye exhibits no discharge.   Neck: Normal range of motion. Neck supple.   Cardiovascular: Normal rate, regular rhythm and normal heart sounds.   No murmur heard.  Pulmonary/Chest: Effort normal and breath sounds normal. No respiratory distress. He has no wheezes. He has no rales.   Abdominal: Soft. There is no tenderness.   Musculoskeletal: Normal range of motion. He exhibits no edema.   Neurological: He is alert and oriented to person, place, and time. No cranial nerve deficit.   Eran hallpike negative    Skin: Skin is warm and dry. No rash noted. He is not diaphoretic. No pallor.   Psychiatric: Mood, memory, affect and judgment normal.   Nursing note and vitals reviewed.        Assessment and Plan:         Herman was seen today for dizziness.    Diagnoses and all orders for this visit:    Dizziness  - Dizziness most likely from orthostatic hypotension although blood pressure did not drop today when standing.    - [place] hallpike negative  - Informed patient to drink 2 L of water daily and to take his time when getting out of bed  - He will return if dizziness does not improve or worsens.     Influenza vaccination administered at current visit            Other Orders Placed This Visit:  No orders of the defined types were placed in this encounter.            Interview, Assessment, Findings, and Plan discussed with Dr. Dennison.    Follow-up if symptoms worsen or fail to improve.    Mario Gregory MD

## 2019-01-21 DIAGNOSIS — N40.1 BPH WITH URINARY OBSTRUCTION: ICD-10-CM

## 2019-01-21 DIAGNOSIS — N13.8 BPH WITH URINARY OBSTRUCTION: ICD-10-CM

## 2019-01-22 RX ORDER — TAMSULOSIN HYDROCHLORIDE 0.4 MG/1
CAPSULE ORAL
Qty: 90 CAPSULE | Refills: 0 | Status: SHIPPED | OUTPATIENT
Start: 2019-01-22 | End: 2019-05-21 | Stop reason: SDUPTHER

## 2019-02-28 ENCOUNTER — OFFICE VISIT (OUTPATIENT)
Dept: INTERNAL MEDICINE | Facility: CLINIC | Age: 69
End: 2019-02-28
Payer: MEDICARE

## 2019-02-28 VITALS
HEART RATE: 64 BPM | DIASTOLIC BLOOD PRESSURE: 66 MMHG | BODY MASS INDEX: 30.13 KG/M2 | HEIGHT: 72 IN | WEIGHT: 222.44 LBS | SYSTOLIC BLOOD PRESSURE: 100 MMHG

## 2019-02-28 DIAGNOSIS — J40 BRONCHITIS: Primary | ICD-10-CM

## 2019-02-28 PROCEDURE — 99212 OFFICE O/P EST SF 10 MIN: CPT | Mod: S$PBB,,, | Performed by: INTERNAL MEDICINE

## 2019-02-28 PROCEDURE — 99212 PR OFFICE/OUTPT VISIT, EST, LEVL II, 10-19 MIN: ICD-10-PCS | Mod: S$PBB,,, | Performed by: INTERNAL MEDICINE

## 2019-02-28 PROCEDURE — 99213 OFFICE O/P EST LOW 20 MIN: CPT | Mod: PBBFAC | Performed by: INTERNAL MEDICINE

## 2019-02-28 PROCEDURE — 99999 PR PBB SHADOW E&M-EST. PATIENT-LVL III: CPT | Mod: PBBFAC,,, | Performed by: INTERNAL MEDICINE

## 2019-02-28 PROCEDURE — 99999 PR PBB SHADOW E&M-EST. PATIENT-LVL III: ICD-10-PCS | Mod: PBBFAC,,, | Performed by: INTERNAL MEDICINE

## 2019-02-28 RX ORDER — BENZONATATE 200 MG/1
200 CAPSULE ORAL 3 TIMES DAILY PRN
Qty: 30 CAPSULE | Refills: 0 | Status: SHIPPED | OUTPATIENT
Start: 2019-02-28 | End: 2019-03-10

## 2019-02-28 NOTE — PROGRESS NOTES
Clinic Note  2/28/2019      Subjective:       Patient ID:  Herman is a 68 y.o. male being seen for an urgent care visit.    Chief Complaint: Cough (at night lying down  deep) and Nasal Congestion    Cough   This is a new problem. Episode onset: 3d. The problem has been unchanged. The cough is non-productive. Associated symptoms include nasal congestion and wheezing. He has tried OTC cough suppressant for the symptoms. The treatment provided mild relief.       Review of Systems   Respiratory: Positive for cough and wheezing.        Medication List with Changes/Refills   New Medications    BENZONATATE (TESSALON) 200 MG CAPSULE    Take 1 capsule (200 mg total) by mouth 3 (three) times daily as needed for Cough.   Current Medications    ASPIRIN 81 MG CHEW    Take 1 tablet (81 mg total) by mouth once daily.    CHOLECALCIFEROL, VITAMIN D3, 1,000 UNIT CAPSULE    Take 2 capsules (2,000 Units total) by mouth once daily.    FLUTICASONE (FLONASE) 50 MCG/ACTUATION NASAL SPRAY    USE 1 SPRAY IN EACH NOSTRIL ONCE DAILY    LATANOPROST 0.005 % OPHTHALMIC SOLUTION    Place 1 drop into both eyes every evening.    LORATADINE (CLARITIN) 10 MG TABLET    Take 1 tablet (10 mg total) by mouth once daily.    PRAVASTATIN (PRAVACHOL) 80 MG TABLET    Take 1 tablet (80 mg total) by mouth once daily.    SILDENAFIL (VIAGRA) 50 MG TABLET    Take 1 tablet (50 mg total) by mouth daily as needed for Erectile Dysfunction.    TAMSULOSIN (FLOMAX) 0.4 MG CAP    TAKE 1 CAPSULE(0.4 MG) BY MOUTH EVERY EVENING       Patient Active Problem List   Diagnosis    Chronic rhinitis    Mixed hyperlipidemia    POAG (primary open-angle glaucoma) - Both Eyes    Dry eyes - Both Eyes    Nuclear sclerosis    Erectile dysfunction    Agatston coronary artery calcium score less than 100    IFG (impaired fasting glucose)    Non morbid obesity due to excess calories    Tubular adenoma of colon: 4/16 repeat 2021    Benign nodular prostatic hyperplasia without lower  urinary tract symptoms    Spinal stenosis of lumbar region: see MRI 2006    Osteoarthritis of both knees    Dizziness    Influenza vaccination administered at current visit           Objective:      /66   Pulse 64   Ht 6' (1.829 m)   Wt 100.9 kg (222 lb 7.1 oz)   BMI 30.17 kg/m²   Estimated body mass index is 30.17 kg/m² as calculated from the following:    Height as of this encounter: 6' (1.829 m).    Weight as of this encounter: 100.9 kg (222 lb 7.1 oz).  Physical Exam   Constitutional: He is well-developed, well-nourished, and in no distress.   HENT:   Right Ear: Tympanic membrane normal.   Left Ear: Tympanic membrane normal.   Mouth/Throat: No oropharyngeal exudate, posterior oropharyngeal edema, posterior oropharyngeal erythema or tonsillar abscesses.   Cardiovascular: Normal rate and normal heart sounds.   Pulmonary/Chest: Breath sounds normal.   Lymphadenopathy:     He has no cervical adenopathy.         Assessment and Plan:         Problem List Items Addressed This Visit     None      Visit Diagnoses     Bronchitis    -  Primary Conservative therapy recommended.  Use OTC analgesic medications.  If symptoms worsen please return to clinic.  Patient education instructions given.      Relevant Medications    benzonatate (TESSALON) 200 MG capsule          Follow Up:   Follow-up if symptoms worsen or fail to improve.        Aidan Gupta

## 2019-04-10 ENCOUNTER — OFFICE VISIT (OUTPATIENT)
Dept: INTERNAL MEDICINE | Facility: CLINIC | Age: 69
End: 2019-04-10
Payer: MEDICARE

## 2019-04-10 VITALS
BODY MASS INDEX: 30.01 KG/M2 | WEIGHT: 221.56 LBS | TEMPERATURE: 98 F | HEIGHT: 72 IN | SYSTOLIC BLOOD PRESSURE: 100 MMHG | OXYGEN SATURATION: 98 % | HEART RATE: 82 BPM | DIASTOLIC BLOOD PRESSURE: 69 MMHG

## 2019-04-10 DIAGNOSIS — J32.9 SINUSITIS, UNSPECIFIED CHRONICITY, UNSPECIFIED LOCATION: Primary | ICD-10-CM

## 2019-04-10 PROCEDURE — 99214 PR OFFICE/OUTPT VISIT, EST, LEVL IV, 30-39 MIN: ICD-10-PCS | Mod: S$PBB,,, | Performed by: NURSE PRACTITIONER

## 2019-04-10 PROCEDURE — 99214 OFFICE O/P EST MOD 30 MIN: CPT | Mod: S$PBB,,, | Performed by: NURSE PRACTITIONER

## 2019-04-10 PROCEDURE — 99999 PR PBB SHADOW E&M-EST. PATIENT-LVL IV: CPT | Mod: PBBFAC,,, | Performed by: NURSE PRACTITIONER

## 2019-04-10 PROCEDURE — 99999 PR PBB SHADOW E&M-EST. PATIENT-LVL IV: ICD-10-PCS | Mod: PBBFAC,,, | Performed by: NURSE PRACTITIONER

## 2019-04-10 PROCEDURE — 99214 OFFICE O/P EST MOD 30 MIN: CPT | Mod: PBBFAC | Performed by: NURSE PRACTITIONER

## 2019-04-10 RX ORDER — AMOXICILLIN AND CLAVULANATE POTASSIUM 875; 125 MG/1; MG/1
1 TABLET, FILM COATED ORAL EVERY 12 HOURS
Qty: 20 TABLET | Refills: 0 | Status: SHIPPED | OUTPATIENT
Start: 2019-04-10 | End: 2019-04-20

## 2019-04-10 RX ORDER — FLUTICASONE PROPIONATE 50 MCG
1 SPRAY, SUSPENSION (ML) NASAL 2 TIMES DAILY
Qty: 1 G | Refills: 12 | Status: SHIPPED | OUTPATIENT
Start: 2019-04-10 | End: 2019-05-10

## 2019-04-10 NOTE — MEDICAL/APP STUDENT
Subjective:       Patient ID: Herman Chirinos is a 68 y.o. male.    Chief Complaint: Cough (productive); Hoarse; and Sore Throat      I have reviewed the notes, assessments, and/or procedures performed by [employer] student Camilla Burdick, I concur with her/his documentation of Heramn Chirinos.  Pt reports a 3 week history of cough that is not improving. The cough is worse at night when he lays down, the cough is disturbing his sleep. Pt reports multiple daytime coughing spells and notes that his voice is hoarse. Cough is productive of thin mucus. Pt states he was seen in clinic 3 weeks ago and given tessalon pearls and Flonase. He stopped the tessalon pearls because it did not help him. He has not been using the Flonase because he does not feel that he is congested. Pt reports using mucinex one tablet twice daily. Denies fever, fatigue, malaise, chills, shortness of breath.     Review of Systems   Constitutional: Negative for activity change, chills, diaphoresis, fatigue and fever.   HENT: Positive for postnasal drip, rhinorrhea, sore throat and voice change. Negative for congestion, ear pain, sinus pressure, sinus pain and sneezing.    Respiratory: Positive for cough. Negative for chest tightness, shortness of breath and wheezing.    Cardiovascular: Negative for chest pain, palpitations and leg swelling.   Gastrointestinal: Negative for diarrhea, nausea and vomiting.   Musculoskeletal: Negative for arthralgias, back pain and myalgias.   Neurological: Negative for dizziness and headaches.   Psychiatric/Behavioral: Positive for sleep disturbance. Negative for confusion. The patient is not nervous/anxious.        Objective:      Physical Exam   Constitutional: He is oriented to person, place, and time. He appears well-developed and well-nourished.  Non-toxic appearance. No distress.   HENT:   Head: Normocephalic and atraumatic.   Right Ear: Hearing, external ear and ear canal normal. A middle ear effusion is present.    Left Ear: Hearing, external ear and ear canal normal. A middle ear effusion is present.   Nose: Mucosal edema present. No rhinorrhea. Right sinus exhibits no maxillary sinus tenderness and no frontal sinus tenderness. Left sinus exhibits no maxillary sinus tenderness and no frontal sinus tenderness.   Mouth/Throat: Uvula is midline and mucous membranes are normal. Posterior oropharyngeal erythema present. No oropharyngeal exudate or posterior oropharyngeal edema.   Post nasal drip   Neck: Neck supple.   Cardiovascular: Normal rate, regular rhythm, normal heart sounds and intact distal pulses.   No murmur heard.  Pulses:       Radial pulses are 2+ on the right side, and 2+ on the left side.   Pulmonary/Chest: Effort normal and breath sounds normal. No respiratory distress. He has no wheezes. He has no rhonchi. He has no rales.   Reactive cough   Abdominal: Soft. Normal appearance.   Musculoskeletal: Normal range of motion. He exhibits no edema.   Lymphadenopathy:     He has no cervical adenopathy.   Neurological: He is alert and oriented to person, place, and time.   Skin: Skin is warm, dry and intact. Capillary refill takes less than 2 seconds. No rash noted. He is not diaphoretic. No erythema. No pallor.   Psychiatric: He has a normal mood and affect. His speech is normal and behavior is normal. Judgment and thought content normal. Cognition and memory are normal.       Assessment:       1. Sinusitis, unspecified chronicity, unspecified location        Plan:         Herman was seen today for cough, hoarse and sore throat.    Diagnoses and all orders for this visit:    Sinusitis, unspecified chronicity, unspecified location  -     amoxicillin-clavulanate 875-125mg (AUGMENTIN) 875-125 mg per tablet; Take 1 tablet by mouth every 12 (twelve) hours. for 10 days  -     fluticasone (FLONASE) 50 mcg/actuation nasal spray; 1 spray (50 mcg total) by Each Nare route 2 (two) times daily.      Patient Instructions   When  using any nasal spray,  shake bottle  before use, place tip in nose, point tip toward your ear, spray the Flonase and then gently sniff.      Take antibiotic with a full meal twice a day for 10 days    Continue your Claritin    Call for any worsening or fever over 100.4      Acute Sinusitis    Acute sinusitis is irritation and swelling of the sinuses. It is usually caused by a viral infection after a common cold. Your doctor can help you find relief.  What is acute sinusitis?  Sinuses are air-filled spaces in the skull behind the face. They are kept moist and clean by a lining of mucosa. Things such as pollen, smoke, and chemical fumes can irritate the mucosa. It can then swell up. As a response to irritation, the mucosa makes more mucus and other fluids. Tiny hairlike cilia cover the mucosa. Cilia help carry mucus toward the opening of the sinus. Too much mucus may cause the cilia to stop working. This blocks the sinus opening. A buildup of fluid in the sinuses then causes pain and pressure. It can also encourage bacteria to grow in the sinuses.  Common symptoms of acute sinusitis  You may have:  · Facial soreness pain  · Headache  · Fever  · Fluid draining in the back of the throat (postnasal drip)  · Congestion  · Drainage that is thick and colored, instead of clear  · Cough  Diagnosing acute sinusitis  Your doctor will ask about your symptoms and health history. He or she will look at your ear, nose, and throat. You usually won't need to have X-rays taken.    The doctor may take a sample of mucus to check for bacteria. If you have sinusitis that keeps coming back, you may need imaging tests such as X-rays or CAT scans. This will help your doctor check for a structural problem that may be causing the infection.  Treating acute sinusitis  Treatment is aimed at unblocking the sinus opening and helping the cilia work again. You may need to take antihistamine and decongestant medicine. These can reduce inflammation  and decrease the amount of fluid your sinuses make. If you have a bacterial infection, you will need to take antibiotic medicine for 10 to 14 days. Take this medicine until it is gone, even if you feel better.  Date Last Reviewed: 10/1/2016  © 2723-8925 [employer]. [address] [place] [address]. All rights reserved. This information is not intended as a substitute for professional medical care. Always follow your healthcare professional's instructions.

## 2019-04-10 NOTE — PATIENT INSTRUCTIONS
When using any nasal spray,  shake bottle  before use, place tip in nose, point tip toward your ear, spray the Flonase and then gently sniff.      Take antibiotic with a full meal twice a day for 10 days    Continue your Claritin    Call for any worsening or fever over 100.4      Acute Sinusitis    Acute sinusitis is irritation and swelling of the sinuses. It is usually caused by a viral infection after a common cold. Your doctor can help you find relief.  What is acute sinusitis?  Sinuses are air-filled spaces in the skull behind the face. They are kept moist and clean by a lining of mucosa. Things such as pollen, smoke, and chemical fumes can irritate the mucosa. It can then swell up. As a response to irritation, the mucosa makes more mucus and other fluids. Tiny hairlike cilia cover the mucosa. Cilia help carry mucus toward the opening of the sinus. Too much mucus may cause the cilia to stop working. This blocks the sinus opening. A buildup of fluid in the sinuses then causes pain and pressure. It can also encourage bacteria to grow in the sinuses.  Common symptoms of acute sinusitis  You may have:  · Facial soreness pain  · Headache  · Fever  · Fluid draining in the back of the throat (postnasal drip)  · Congestion  · Drainage that is thick and colored, instead of clear  · Cough  Diagnosing acute sinusitis  Your doctor will ask about your symptoms and health history. He or she will look at your ear, nose, and throat. You usually won't need to have X-rays taken.    The doctor may take a sample of mucus to check for bacteria. If you have sinusitis that keeps coming back, you may need imaging tests such as X-rays or CAT scans. This will help your doctor check for a structural problem that may be causing the infection.  Treating acute sinusitis  Treatment is aimed at unblocking the sinus opening and helping the cilia work again. You may need to take antihistamine and decongestant medicine. These can reduce  inflammation and decrease the amount of fluid your sinuses make. If you have a bacterial infection, you will need to take antibiotic medicine for 10 to 14 days. Take this medicine until it is gone, even if you feel better.  Date Last Reviewed: 10/1/2016  © 0824-5869 [employer]. [address] [place] [address]. All rights reserved. This information is not intended as a substitute for professional medical care. Always follow your healthcare professional's instructions.

## 2019-04-22 ENCOUNTER — PES CALL (OUTPATIENT)
Dept: ADMINISTRATIVE | Facility: CLINIC | Age: 69
End: 2019-04-22

## 2019-04-26 ENCOUNTER — OFFICE VISIT (OUTPATIENT)
Dept: INTERNAL MEDICINE | Facility: CLINIC | Age: 69
End: 2019-04-26
Payer: MEDICARE

## 2019-04-26 ENCOUNTER — OFFICE VISIT (OUTPATIENT)
Dept: OPTOMETRY | Facility: CLINIC | Age: 69
End: 2019-04-26
Payer: MEDICARE

## 2019-04-26 VITALS
HEIGHT: 72 IN | BODY MASS INDEX: 29.23 KG/M2 | DIASTOLIC BLOOD PRESSURE: 80 MMHG | HEART RATE: 70 BPM | SYSTOLIC BLOOD PRESSURE: 110 MMHG | TEMPERATURE: 98 F | WEIGHT: 215.81 LBS | OXYGEN SATURATION: 98 %

## 2019-04-26 DIAGNOSIS — N40.0 BENIGN NODULAR PROSTATIC HYPERPLASIA WITHOUT LOWER URINARY TRACT SYMPTOMS: ICD-10-CM

## 2019-04-26 DIAGNOSIS — H40.1131 PRIMARY OPEN ANGLE GLAUCOMA OF BOTH EYES, MILD STAGE: ICD-10-CM

## 2019-04-26 DIAGNOSIS — R73.03 PREDIABETES: ICD-10-CM

## 2019-04-26 DIAGNOSIS — M48.061 SPINAL STENOSIS OF LUMBAR REGION WITHOUT NEUROGENIC CLAUDICATION: ICD-10-CM

## 2019-04-26 DIAGNOSIS — M17.0 OSTEOARTHRITIS OF BOTH KNEES, UNSPECIFIED OSTEOARTHRITIS TYPE: ICD-10-CM

## 2019-04-26 DIAGNOSIS — H52.03 HYPEROPIA WITH PRESBYOPIA, BILATERAL: ICD-10-CM

## 2019-04-26 DIAGNOSIS — N52.9 ERECTILE DYSFUNCTION, UNSPECIFIED ERECTILE DYSFUNCTION TYPE: ICD-10-CM

## 2019-04-26 DIAGNOSIS — H25.13 NUCLEAR SCLEROSIS, BILATERAL: ICD-10-CM

## 2019-04-26 DIAGNOSIS — H52.4 HYPEROPIA WITH PRESBYOPIA, BILATERAL: ICD-10-CM

## 2019-04-26 DIAGNOSIS — J31.0 CHRONIC RHINITIS: ICD-10-CM

## 2019-04-26 DIAGNOSIS — M54.12 CERVICAL RADICULOPATHY: ICD-10-CM

## 2019-04-26 DIAGNOSIS — Z12.5 SCREENING PSA (PROSTATE SPECIFIC ANTIGEN): ICD-10-CM

## 2019-04-26 DIAGNOSIS — Z00.00 ANNUAL PHYSICAL EXAM: ICD-10-CM

## 2019-04-26 DIAGNOSIS — H40.1131 PRIMARY OPEN ANGLE GLAUCOMA OF BOTH EYES, MILD STAGE: Primary | ICD-10-CM

## 2019-04-26 DIAGNOSIS — R22.9 SUBCUTANEOUS MASS: ICD-10-CM

## 2019-04-26 DIAGNOSIS — E55.9 VITAMIN D DEFICIENCY: ICD-10-CM

## 2019-04-26 DIAGNOSIS — E78.2 MIXED HYPERLIPIDEMIA: ICD-10-CM

## 2019-04-26 DIAGNOSIS — E66.3 OVERWEIGHT (BMI 25.0-29.9): ICD-10-CM

## 2019-04-26 PROCEDURE — 99215 PR OFFICE/OUTPT VISIT, EST, LEVL V, 40-54 MIN: ICD-10-PCS | Mod: S$PBB,,, | Performed by: FAMILY MEDICINE

## 2019-04-26 PROCEDURE — 99215 OFFICE O/P EST HI 40 MIN: CPT | Mod: S$PBB,,, | Performed by: FAMILY MEDICINE

## 2019-04-26 PROCEDURE — 99999 PR PBB SHADOW E&M-EST. PATIENT-LVL II: CPT | Mod: PBBFAC,,, | Performed by: OPTOMETRIST

## 2019-04-26 PROCEDURE — 92014 PR EYE EXAM, EST PATIENT,COMPREHESV: ICD-10-PCS | Mod: S$PBB,,, | Performed by: OPTOMETRIST

## 2019-04-26 PROCEDURE — 99999 PR PBB SHADOW E&M-EST. PATIENT-LVL II: ICD-10-PCS | Mod: PBBFAC,,, | Performed by: OPTOMETRIST

## 2019-04-26 PROCEDURE — 99212 OFFICE O/P EST SF 10 MIN: CPT | Mod: PBBFAC,27,PO | Performed by: OPTOMETRIST

## 2019-04-26 PROCEDURE — 92014 COMPRE OPH EXAM EST PT 1/>: CPT | Mod: S$PBB,,, | Performed by: OPTOMETRIST

## 2019-04-26 PROCEDURE — 99214 OFFICE O/P EST MOD 30 MIN: CPT | Mod: PBBFAC | Performed by: FAMILY MEDICINE

## 2019-04-26 PROCEDURE — 99999 PR PBB SHADOW E&M-EST. PATIENT-LVL IV: ICD-10-PCS | Mod: PBBFAC,,, | Performed by: FAMILY MEDICINE

## 2019-04-26 PROCEDURE — 99999 PR PBB SHADOW E&M-EST. PATIENT-LVL IV: CPT | Mod: PBBFAC,,, | Performed by: FAMILY MEDICINE

## 2019-04-26 RX ORDER — LATANOPROST 50 UG/ML
1 SOLUTION/ DROPS OPHTHALMIC NIGHTLY
Qty: 2.5 ML | Refills: 12 | Status: SHIPPED | OUTPATIENT
Start: 2019-04-26 | End: 2019-10-01 | Stop reason: SDUPTHER

## 2019-04-26 RX ORDER — SILDENAFIL 50 MG/1
50 TABLET, FILM COATED ORAL DAILY PRN
Qty: 30 TABLET | Refills: 6 | Status: SHIPPED | OUTPATIENT
Start: 2019-04-26 | End: 2019-12-16 | Stop reason: SDUPTHER

## 2019-04-26 NOTE — PROGRESS NOTES
LEANDRA LITTLEJOHN 11/2017  Patient was due back last march for an IOP check.  Using   Latanoprost OU Q HS. Last used last night.  Wears OTC +2.50, seem to work   fine. Distance seems fine without glasses.      Last edited by Erin Maldonado on 4/26/2019  1:34 PM. (History)            Assessment /Plan     For exam results, see Encounter Report.    Primary open angle glaucoma of both eyes, mild stage    Nuclear sclerosis, bilateral    Hyperopia with presbyopia, bilateral      1. IOP stable,  Cont Latanaprost drops, IOP fine for CD ratio, no change in nerve, IOP low, prev HVf poor reliability,  Prev Oct abnormal od and normal os. Prev IOP increased due to pt out of meds. Also prev slight increase in cup right eye. May need to add a  Med in future.  Pachy thin. Prev Gonio open.  RTC 4 mos IOP ck. And HVF/OCT.  Add med if worsening.   2. Educated pt on presence of cataracts and effects on vision. No surgery at this time. Recheck in one year.  3. Cont with readers.

## 2019-04-26 NOTE — PROGRESS NOTES
"Subjective:      Patient ID: Herman Chirinos is a 68 y.o. male.    Chief Complaint: Establish Care      HPI:  Herman Chirinos is a 68 year old male with BPH, chronic rhinitis, erectile dysfunction, glaucoma, hyperlipidemia, history of impaired fasting glucose, lumbar stenosis, obesity, osteoarthritis bilateral knees, prediabetes, and vitamin D deficiency who presents to clinic today to establish care today and for annual exam.    States he has lumps in his body that he would like me to take a look at today.  First noticed ~2 years ago.  One located to left hip and another is located in the chest.  States the lump at his belt line has gradually increased in size.  Denies associated pain with either lump.  Previous PCP told him it was nothing to worry about but he did not appreciate this recommendation.  Denies overlying skin changes.    Complains of tingling sensation to left upper extremity with associated neck stiffness/soreness in the mornings.  Left upper extremity tingling occurs a couple of times per day.  Denies associated weakness.    BPH:  Prescribed tamsulosin 0.4 mg by mouth daily.  PSA 2.5 5/12/18.    Chronic rhinitis:  Prescribed Claritin 10 mg by mouth daily and Flonase.  States he had a cold about 1 month ago.  Endorses some persistent nasal congestion and drainage.  Symptoms have gradually improved.  Was hoarse for ~3 weeks.  States he is no longer taking Claritin.  States he "gags" up phlegm every now and then.  States the phlegm is thick.      ED:  Prescribed sildenafil 50 mg by mouth daily as needed.    Glaucoma:  Prescribed latanoprost 0.005% eye drops.    HLD:  Prescribed pravastatin 80 mg by mouth daily.  Lipid panel within normal limits 5/21/18.    Lumbar stenosis:  Previously referred to medical fitness program and recommended to use ice and Tylenol.  Can consider PT vs. Orthopedic referral depending on how he progressed with weight loss and more conservative measures.  Symptoms stable.  " Avoids heavy lifting.    OA, knees:  Left knee worse than right.  Recommended to use ice and Tylenol.  Can consider PT vs. Orthopedic referral depending on how he progressed with weight loss and more conservative measures.  Symptoms stable.  Does not take anything for this currently.    Overweight:  BMI 29.27.  States he lifts weights.  States he walks when mowing his yard.      Prediabetes:  A1c 5.8% 5/21/18.  Enjoys ice cream.  Drinks 3 sodas per week.  Enjoys carbohydrates as well.      Vitamin D deficiency:  Noted to 23 5/21/18.  Does not take any supplement currently.    Health Care Maintenance:  Influenza vaccination:  1/11/19  Last tetanus booster:  5/24/18  Prevnar:  1/8/16  Pneumovax:  8/29/14  Last routine labs:  5/21/18  Last colonoscopy:  4/7/16; One 4 mm polyp in the transverse colon, Two 3 to 5 mm polyps in the transverse colon and at the hepatic flexure; repeat 5 years      Past Medical History:   Diagnosis Date    Appendicitis     Benign neoplasm of colon     Cataract     Chronic rhinitis     Colon polyps     Glaucoma     Hyperlipidemia     IFG (impaired fasting glucose) 1/8/2016    Lumbar stenosis     Osteoarthritis of both knees 5/24/2018    Tubular adenoma of colon: 4/16 repeat 2021 4/7/2017       Past Surgical History:   Procedure Laterality Date    APPENDECTOMY      COLONOSCOPY  2007    repeat recommended in five years    COLONOSCOPY N/A 4/7/2016    [facility] ([facility])    COLONOSCOPY N/A 2/22/2013    [facility] at [facility] (4TH FLR)       Family History   Problem Relation Age of Onset    Coronary artery disease Mother     Hypertension Mother     Heart disease Mother         heart transplant    Diabetes Mellitus Father     Diabetes Father     Kidney disease Sister     No Known Problems Brother     No Known Problems Daughter     No Known Problems Sister     No Known Problems Brother     No Known Problems  Brother     No Known Problems Son     No Known Problems Son     No Known Problems Son     No Known Problems Maternal Aunt     No Known Problems Maternal Uncle     No Known Problems Paternal Aunt     No Known Problems Paternal Uncle     No Known Problems Maternal Grandmother     No Known Problems Maternal Grandfather     No Known Problems Paternal Grandmother     No Known Problems Paternal Grandfather     No Known Problems Daughter     No Known Problems Daughter     Cancer Neg Hx     Stroke Neg Hx     Glaucoma Neg Hx     Cataracts Neg Hx     Blindness Neg Hx     Amblyopia Neg Hx     Macular degeneration Neg Hx     Retinal detachment Neg Hx     Strabismus Neg Hx     Thyroid disease Neg Hx     Psoriasis Neg Hx        Social History     Socioeconomic History    Marital status:      Spouse name: Not on file    Number of children: 6    Years of education: Not on file    Highest education level: Not on file   Occupational History    Occupation: [employer]     Employer: [employer]     Comment: shell   Social Needs    Financial resource strain: Not on file    Food insecurity:     Worry: Not on file     Inability: Not on file    Transportation needs:     Medical: Not on file     Non-medical: Not on file   Tobacco Use    Smoking status: Never Smoker    Smokeless tobacco: Never Used   Substance and Sexual Activity    Alcohol use: No    Drug use: No    Sexual activity: Yes     Partners: Female   Lifestyle    Physical activity:     Days per week: Not on file     Minutes per session: Not on file    Stress: Not on file   Relationships    Social connections:     Talks on phone: Not on file     Gets together: Not on file     Attends Islam service: Not on file     Active member of club or organization: Not on file     Attends meetings of clubs or organizations: Not on file     Relationship status: Not on file   Other Topics Concern    Not on file   Social History Narrative     Not on file       Review of Systems   Constitutional: Negative for chills, fatigue and fever.   HENT: Negative for congestion, hearing loss, nosebleeds, rhinorrhea, sore throat and trouble swallowing.    Eyes: Negative for pain and visual disturbance.   Respiratory: Negative for cough, shortness of breath and wheezing.    Cardiovascular: Negative for chest pain and palpitations.   Gastrointestinal: Negative for abdominal distention, abdominal pain, constipation, diarrhea, nausea and vomiting.   Genitourinary: Negative for difficulty urinating, dysuria, frequency, hematuria and urgency.   Musculoskeletal: Positive for arthralgias and back pain. Negative for myalgias.   Skin: Negative for color change and rash.   Neurological: Positive for numbness. Negative for dizziness, syncope, speech difficulty, weakness and headaches.   Psychiatric/Behavioral: Negative for agitation, behavioral problems and confusion. The patient is not nervous/anxious.      Objective:     Vitals:    04/26/19 1116   BP: 110/80   BP Location: Left arm   Patient Position: Sitting   BP Method: Medium (Manual)   Pulse: 70   Temp: 98.3 °F (36.8 °C)   TempSrc: Oral   SpO2: 98%   Weight: 97.9 kg (215 lb 13.3 oz)   Height: 6' (1.829 m)       Physical Exam   Constitutional: He appears well-developed and well-nourished. He is cooperative. No distress.   HENT:   Head: Normocephalic and atraumatic.   Right Ear: Hearing and external ear normal.   Left Ear: Hearing and external ear normal.   Nose: Nose normal. No rhinorrhea. No epistaxis.   Mouth/Throat: Oropharynx is clear and moist and mucous membranes are normal. No oral lesions.   Eyes: Pupils are equal, round, and reactive to light. Conjunctivae, EOM and lids are normal. Right eye exhibits no discharge. Left eye exhibits no discharge.   Neck: Trachea normal and normal range of motion. Neck supple. No tracheal deviation present.   Cardiovascular: Normal rate, regular rhythm and normal heart sounds. Exam  reveals no gallop and no friction rub.   No murmur heard.  Pulmonary/Chest: Effort normal and breath sounds normal. No respiratory distress. He has no wheezes. He has no rales.   Abdominal: Soft. Bowel sounds are normal. He exhibits no distension. There is no tenderness. There is no rebound and no guarding.   Musculoskeletal: Normal range of motion. He exhibits no edema or deformity.        Cervical back: He exhibits no tenderness and no bony tenderness.        Arms:  Neurological: He is alert. He has normal strength. No cranial nerve deficit or sensory deficit. He exhibits normal muscle tone.   Skin: Skin is warm and dry. No rash noted.   Psychiatric: He has a normal mood and affect. His speech is normal and behavior is normal. Judgment and thought content normal. Cognition and memory are normal.   Nursing note and vitals reviewed.     Assessment:      1. Subcutaneous mass    2. Cervical radiculopathy    3. Annual physical exam    4. Benign nodular prostatic hyperplasia without lower urinary tract symptoms    5. Chronic rhinitis    6. Erectile dysfunction, unspecified erectile dysfunction type    7. Mixed hyperlipidemia    8. Osteoarthritis of both knees, unspecified osteoarthritis type    9. Overweight (BMI 25.0-29.9)    10. Prediabetes    11. Screening PSA (prostate specific antigen)    12. Spinal stenosis of lumbar region: see MRI 2006    13. Vitamin D deficiency    14. Primary open angle glaucoma of both eyes, mild stage      Plan:   Herman was seen today for establish care.    Diagnoses and all orders for this visit:    Subcutaneous mass  -     US Soft Tissue Misc; Future    Cervical radiculopathy  -     MRI Cervical Spine Without Contrast; Future; recommended OTC Tylenol PRN, avoid exacerbating activities.      Annual physical exam  -     CBC auto differential; Future  -     Comprehensive metabolic panel; Future  -     Lipid panel; Future  -     Hemoglobin A1c; Future    Benign nodular prostatic hyperplasia  without lower urinary tract symptoms  -     PSA, Screening; Future    Chronic rhinitis        -     Recommended OTC Mucinex, continue Flonase, restart Claritin.  Return to clinic if no improvement.    Erectile dysfunction, unspecified erectile dysfunction type  -     sildenafil (VIAGRA) 50 MG tablet; Take 1 tablet (50 mg total) by mouth daily as needed for Erectile Dysfunction.    Mixed hyperlipidemia  -     Lipid panel; Future    Osteoarthritis of both knees, unspecified osteoarthritis type        -     Stable, continue OTC Tylenol PRN, ice packs/cold compresses.  Recommended weight loss, to establish with medical fitness program as previously referred.    Overweight (BMI 25.0-29.9)        -     Recommended weight loss, to establish with medical fitness program as previously referred.    Prediabetes  -     Hemoglobin A1c; Future; counseled patient on the importance of reduced intake of simple sugars/carbohydrates, routine daily aerobic exercise, and weight loss.    Screening PSA (prostate specific antigen)  -     PSA, Screening; Future    Spinal stenosis of lumbar region: see MRI 2006        -     Stable; recommended OTC Tylenol PRN, avoid exacerbating activities. Recommended weight loss.  Return to clinic if no improvement.    Vitamin D deficiency  -     Vitamin D; Future    Primary open angle glaucoma of both eyes, mild stage  -     latanoprost 0.005 % ophthalmic solution; Place 1 drop into both eyes every evening.

## 2019-04-26 NOTE — LETTER
April 26, 2019      Betsy Haines MD  [address]  [facility] [address]           [place] [facility]  [address]  [place] [address]  Phone: [phone]  Fax: [phone]          Patient: Herman Chirinos   MR Number: [id number]   YOB: 1950   Date of Visit: 4/26/2019       Dear Dr. Betsy Haines:    Thank you for referring Herman Chirinos to me for evaluation. Attached you will find relevant portions of my assessment and plan of care.    If you have questions, please do not hesitate to call me. I look forward to following Herman Chirinos along with you.    Sincerely,    Riley Bradshaw, OD    Enclosure  CC:  No Recipients    If you would like to receive this communication electronically, please contact [email or web address] or [phone] to request more information on [facility] access.    For providers and/or their staff who would like to refer a patient to [facility], please contact us through our one-stop-shop provider referral line, [facility] , at [phone].    If you feel you have received this communication in error or would no longer like to receive these types of communications, please e-mail [email or web address]

## 2019-04-29 ENCOUNTER — HOSPITAL ENCOUNTER (OUTPATIENT)
Dept: RADIOLOGY | Facility: HOSPITAL | Age: 69
Discharge: HOME | End: 2019-04-29
Attending: FAMILY MEDICINE
Payer: MEDICARE

## 2019-04-29 DIAGNOSIS — R22.9 SUBCUTANEOUS MASS: ICD-10-CM

## 2019-04-29 DIAGNOSIS — M54.12 CERVICAL RADICULOPATHY: ICD-10-CM

## 2019-04-29 PROCEDURE — 72141 MRI NECK SPINE W/O DYE: CPT | Mod: TC

## 2019-04-29 PROCEDURE — 76705 US ABDOMEN LIMITED: ICD-10-PCS | Mod: 26,,, | Performed by: INTERNAL MEDICINE

## 2019-04-29 PROCEDURE — 76705 ECHO EXAM OF ABDOMEN: CPT | Mod: 26,,, | Performed by: INTERNAL MEDICINE

## 2019-04-29 PROCEDURE — 72141 MRI CERVICAL SPINE WITHOUT CONTRAST: ICD-10-PCS | Mod: 26,,, | Performed by: RADIOLOGY

## 2019-04-29 PROCEDURE — 72141 MRI NECK SPINE W/O DYE: CPT | Mod: 26,,, | Performed by: RADIOLOGY

## 2019-04-29 PROCEDURE — 76705 ECHO EXAM OF ABDOMEN: CPT | Mod: TC

## 2019-05-02 ENCOUNTER — TELEPHONE (OUTPATIENT)
Dept: INTERNAL MEDICINE | Facility: CLINIC | Age: 69
End: 2019-05-02

## 2019-05-02 DIAGNOSIS — R73.03 PREDIABETES: Primary | ICD-10-CM

## 2019-05-02 DIAGNOSIS — M54.12 CERVICAL RADICULOPATHY: Primary | ICD-10-CM

## 2019-05-02 DIAGNOSIS — E78.2 MIXED HYPERLIPIDEMIA: ICD-10-CM

## 2019-05-02 DIAGNOSIS — R22.9 SUBCUTANEOUS MASS: Primary | ICD-10-CM

## 2019-05-02 DIAGNOSIS — E04.1 THYROID NODULE: ICD-10-CM

## 2019-05-02 NOTE — TELEPHONE ENCOUNTER
----- Message from Meng Peña MD sent at 5/2/2019 10:03 AM CDT -----  Ultrasound reviewed showing solid left hip lesion, likely representing fatty mass such as lipoma.  Benign.  Referred to general surgery for further management/if patient wants removed.  Please notify.

## 2019-05-02 NOTE — TELEPHONE ENCOUNTER
----- Message from Meng Peña MD sent at 5/2/2019  9:58 AM CDT -----  Labs reviewed.  PSA 3.3, within normal limits.  A1c worsened to 6.3% from 5.8% 11 months ago, still in prediabetic range but approaching diabetic range (6.5 or greater).  Would recommend diabetic style diet with reduction in intake of simple sugars, carbohydrates, starches; daily aerobic exercise; and weight loss.  Repeat A1c in 3 months (ordered); if not improved or worsened will need to consider starting medication for this at that time.  Total cholesterol also elevated at 215, has elevated 10 year risk score of 8.8% for acute cardiovascular event over the next 10 years.  Would recommend continuing pravastatin.  I would also recommend a low cholesterol diet such as the Mediterranean style diet--overall decreased meat consumption, substituting lean proteins like baked fish/poultry instead of red meats, overall increased vegetable consumption, and substituting healthy oils like extra virgin olive oil in the place of butters/grease.  Also vitamin D low at 26, recommend -800  units of vitamin D per day.  Otherwise labs unremarkable.  Please notify patient of above.

## 2019-05-02 NOTE — TELEPHONE ENCOUNTER
----- Message from Meng Peña MD sent at 5/2/2019 10:01 AM CDT -----  MRI reviewed showing Multilevel degenerative changes of the cervical spine, noting moderate spinal canal stenosis at C3-C4 with associated moderate left-sided and mild right-sided neural foraminal narrowing.  Additional note is made of moderate to severe left-sided and moderate right-sided neural foraminal narrowing at C4-C5.  Also noted was an incidental finding of a subcentimeter right thyroid lobe nodule.  Referred to back/spine clinic for further management of neck pain/cervical radiculopathy.  US thyroid ordered for further evaluation of thyroid nodule.  Please notify patient.

## 2019-05-03 ENCOUNTER — OFFICE VISIT (OUTPATIENT)
Dept: SURGERY | Facility: CLINIC | Age: 69
End: 2019-05-03
Payer: MEDICARE

## 2019-05-03 VITALS
BODY MASS INDEX: 29.05 KG/M2 | TEMPERATURE: 98 F | HEART RATE: 59 BPM | SYSTOLIC BLOOD PRESSURE: 129 MMHG | WEIGHT: 214.5 LBS | DIASTOLIC BLOOD PRESSURE: 78 MMHG | HEIGHT: 72 IN

## 2019-05-03 DIAGNOSIS — D17.1 LIPOMA OF TORSO: ICD-10-CM

## 2019-05-03 DIAGNOSIS — D17.24 LIPOMA OF LEFT LOWER EXTREMITY: Primary | ICD-10-CM

## 2019-05-03 PROCEDURE — 99203 PR OFFICE/OUTPT VISIT, NEW, LEVL III, 30-44 MIN: ICD-10-PCS | Mod: S$PBB,,, | Performed by: SURGERY

## 2019-05-03 PROCEDURE — 99203 OFFICE O/P NEW LOW 30 MIN: CPT | Mod: S$PBB,,, | Performed by: SURGERY

## 2019-05-03 PROCEDURE — 99213 OFFICE O/P EST LOW 20 MIN: CPT | Mod: PBBFAC | Performed by: SURGERY

## 2019-05-03 PROCEDURE — 99999 PR PBB SHADOW E&M-EST. PATIENT-LVL III: CPT | Mod: PBBFAC,,, | Performed by: SURGERY

## 2019-05-03 PROCEDURE — 99999 PR PBB SHADOW E&M-EST. PATIENT-LVL III: ICD-10-PCS | Mod: PBBFAC,,, | Performed by: SURGERY

## 2019-05-03 NOTE — PROGRESS NOTES
History & Physical    SUBJECTIVE:     History of Present Illness:  Patient is a 68 y.o. male presents with a lipoma of the left hip and the mid chest.  He states he has been there for about 2 years, has not increased in size, and do not cause pain.  He does desire removal as he worries about the masses and would like them evaluated by pathology. He denies fevers or chills.  Denies redness or tenderness.      Review of patient's allergies indicates:  No Known Allergies    Current Outpatient Medications   Medication Sig Dispense Refill    aspirin 81 MG Chew Take 1 tablet (81 mg total) by mouth once daily. 30 tablet 12    cholecalciferol, vitamin D3, 1,000 unit capsule Take 2 capsules (2,000 Units total) by mouth once daily. 60 capsule 12    fluticasone (FLONASE) 50 mcg/actuation nasal spray 1 spray (50 mcg total) by Each Nare route 2 (two) times daily. 1 g 12    latanoprost 0.005 % ophthalmic solution Place 1 drop into both eyes every evening. 2.5 mL 12    loratadine (CLARITIN) 10 mg tablet Take 1 tablet (10 mg total) by mouth once daily. 1 Bottle 0    pravastatin (PRAVACHOL) 80 MG tablet Take 1 tablet (80 mg total) by mouth once daily. 30 tablet 6    sildenafil (VIAGRA) 50 MG tablet Take 1 tablet (50 mg total) by mouth daily as needed for Erectile Dysfunction. 30 tablet 6    tamsulosin (FLOMAX) 0.4 mg Cap TAKE 1 CAPSULE(0.4 MG) BY MOUTH EVERY EVENING 90 capsule 0     No current facility-administered medications for this visit.        Past Medical History:   Diagnosis Date    Appendicitis     Benign neoplasm of colon     Cataract     Chronic rhinitis     Colon polyps     Glaucoma     Hyperlipidemia     IFG (impaired fasting glucose) 1/8/2016    Lumbar stenosis     Osteoarthritis of both knees 5/24/2018    Tubular adenoma of colon: 4/16 repeat 2021 4/7/2017     Past Surgical History:   Procedure Laterality Date    APPENDECTOMY      COLONOSCOPY  2007    repeat recommended in five years     COLONOSCOPY N/A 4/7/2016    [facility])    COLONOSCOPY N/A 2/22/2013    [facility] at [facility] (4TH FLR)     Family History   Problem Relation Age of Onset    Coronary artery disease Mother     Hypertension Mother     Heart disease Mother         heart transplant    Diabetes Mellitus Father     Diabetes Father     Kidney disease Sister     No Known Problems Brother     No Known Problems Daughter     No Known Problems Sister     No Known Problems Brother     No Known Problems Brother     No Known Problems Son     No Known Problems Son     No Known Problems Son     No Known Problems Maternal Aunt     No Known Problems Maternal Uncle     No Known Problems Paternal Aunt     No Known Problems Paternal Uncle     No Known Problems Maternal Grandmother     No Known Problems Maternal Grandfather     No Known Problems Paternal Grandmother     No Known Problems Paternal Grandfather     No Known Problems Daughter     No Known Problems Daughter     Cancer Neg Hx     Stroke Neg Hx     Glaucoma Neg Hx     Cataracts Neg Hx     Blindness Neg Hx     Amblyopia Neg Hx     Macular degeneration Neg Hx     Retinal detachment Neg Hx     Strabismus Neg Hx     Thyroid disease Neg Hx     Psoriasis Neg Hx      Social History     Tobacco Use    Smoking status: Never Smoker    Smokeless tobacco: Never Used   Substance Use Topics    Alcohol use: No    Drug use: No        Review of Systems:  Review of Systems   Constitutional: Negative for activity change, appetite change, chills, diaphoresis, fatigue and fever.   HENT: Negative for congestion, sinus pressure, sneezing, sore throat and tinnitus.    Eyes: Negative for pain, discharge, redness, itching and visual disturbance.   Respiratory: Negative for apnea, cough, choking, chest tightness and shortness of breath.    Cardiovascular: Negative for chest pain, palpitations and leg swelling.    Gastrointestinal: Negative for abdominal distention, abdominal pain, blood in stool, constipation, diarrhea and nausea.   Musculoskeletal: Negative for arthralgias, back pain and gait problem.   Neurological: Negative for dizziness, facial asymmetry, light-headedness and headaches.   Psychiatric/Behavioral: Negative for agitation, behavioral problems and confusion.       OBJECTIVE:     Vital Signs (Most Recent)  Temp: 98.1 °F (36.7 °C) (05/03/19 0753)  Pulse: (!) 59 (05/03/19 0753)  BP: 129/78 (05/03/19 0753)  6' (1.829 m)  97.3 kg (214 lb 8.1 oz)     Physical Exam:  Physical Exam   Constitutional: He is oriented to person, place, and time. He appears well-developed and well-nourished. No distress.   HENT:   Head: Normocephalic and atraumatic.   Right Ear: External ear normal.   Left Ear: External ear normal.   Eyes: EOM are normal. Right eye exhibits no discharge. Left eye exhibits no discharge. No scleral icterus.   Cardiovascular: Normal rate and regular rhythm.   Pulmonary/Chest: Effort normal. No respiratory distress.   Abdominal: Soft.   Musculoskeletal: Normal range of motion.   Neurological: He is alert and oriented to person, place, and time.   Skin: Skin is warm and dry. No rash noted. He is not diaphoretic. No erythema. No pallor.   Left hip soft mass.  3cm, mobile,  Non tender.  Mid chest soft mass.  1cm, mobile,  Non tender   Psychiatric: He has a normal mood and affect. His behavior is normal. Judgment and thought content normal.       ASSESSMENT/PLAN:     Lipoma x 2    PLAN:Plan     To minors for removal of lipoma x 2         Saud Romero MD

## 2019-05-03 NOTE — LETTER
May 3, 2019      Betsy Haines MD  [address]  [place] [address]           [facility]  [address]  [facility] [address]  Phone: [phone]          Patient: Herman Chirinos   MR Number: [id number]   YOB: 1950   Date of Visit: 5/3/2019       Dear Dr. Betsy Haines:    Thank you for referring Herman Chirinos to me for evaluation. Attached you will find relevant portions of my assessment and plan of care.    If you have questions, please do not hesitate to call me. I look forward to following Herman Chirinos along with you.    Sincerely,    Saud Romero Jr., MD    Enclosure  CC:  No Recipients    If you would like to receive this communication electronically, please contact [email or web address] or [phone] to request more information on [employer] Link access.    For providers and/or their staff who would like to refer a patient to [facility], please contact us through our one-stop-shop provider referral line, [facility] , at [phone].    If you feel you have received this communication in error or would no longer like to receive these types of communications, please e-mail [email or web address]

## 2019-05-10 ENCOUNTER — PROCEDURE VISIT (OUTPATIENT)
Dept: SURGERY | Facility: CLINIC | Age: 69
End: 2019-05-10
Payer: MEDICARE

## 2019-05-10 VITALS
TEMPERATURE: 98 F | BODY MASS INDEX: 28.85 KG/M2 | HEART RATE: 75 BPM | DIASTOLIC BLOOD PRESSURE: 73 MMHG | SYSTOLIC BLOOD PRESSURE: 109 MMHG | WEIGHT: 213 LBS | HEIGHT: 72 IN

## 2019-05-10 DIAGNOSIS — D17.9 LIPOMA, UNSPECIFIED SITE: Primary | ICD-10-CM

## 2019-05-10 PROCEDURE — 21555 EXC, TUMOR SOFT TISSUE: ICD-10-PCS | Mod: 51,S$PBB,, | Performed by: SURGERY

## 2019-05-10 PROCEDURE — 27337 EXC, TUMOR SOFT TISSUE: ICD-10-PCS | Mod: S$PBB,LT,, | Performed by: SURGERY

## 2019-05-10 PROCEDURE — 21555 EXC NECK LES SC < 3 CM: CPT | Mod: 51,S$PBB,, | Performed by: SURGERY

## 2019-05-10 PROCEDURE — 88304 TISSUE EXAM BY PATHOLOGIST: CPT | Mod: 26,,, | Performed by: PATHOLOGY

## 2019-05-10 PROCEDURE — 11402 EXC TR-EXT B9+MARG 1.1-2 CM: CPT | Mod: PBBFAC | Performed by: SURGERY

## 2019-05-10 PROCEDURE — 88304 TISSUE SPECIMEN TO PATHOLOGY: ICD-10-PCS | Mod: 26,,, | Performed by: PATHOLOGY

## 2019-05-10 PROCEDURE — 27337 EXC THIGH/KNEE LES SC 3 CM/>: CPT | Mod: S$PBB,LT,, | Performed by: SURGERY

## 2019-05-10 PROCEDURE — 88304 TISSUE EXAM BY PATHOLOGIST: CPT | Performed by: PATHOLOGY

## 2019-05-10 NOTE — PROCEDURES
"Exc, Tumor Soft Tissue  Date/Time: 5/10/2019 2:37 PM  Performed by: Saud Romero Jr., MD  Authorized by: Saud Romero Jr., MD     Consent Done?:  Yes (Written)  Time out: Immediately prior to procedure a "time out" was called to verify the correct patient, procedure, equipment, support staff and site/side marked as required.      STAFF:  Assistants?: Yes    List of assistants:  Jerry Interiano I was present for the entire procedure.  INDICATIONS:: lipoma.  LOCATION:  Body area:  Chestleft    PREP:: lateral.    ANESTHESIA:  Anesthesia:  Local infiltration  Local anesthetic:  Lidocaine 1% without epinephrine    PROCEDURE DETAILS:  Excision type:  Skin  Malignancy:  Benign  Excision size (cm):  1.5  Scalpel size:  15  Incision type:  Single straight  Specimens?: Yes    Specimens submitted to pathology. (lipoma)  Hemostasis was obtained.  Estimated blood loss (cc):  1  Wound closure:  Simple  Wound repair size (cm):  1.5  Sutures:  4-0 Monocryl  Sterile dressings:  Gauze, Mastisol, Steri-strips and Tegaderm  Post-op diagnosis: Same as pre-op diagnosis.  Needle, instrument, and sponge counts were correct.  Patient tolerated the procedure well with no immediate complications.  Post-operative instructions were provided for the patient.  Patient was discharged and will follow up for wound check and pathology results. and Patient was discharged and will follow up for wound check.  Exc, Tumor Soft Tissue  Date/Time: 5/10/2019 2:39 PM  Performed by: Saud Romero Jr., MD  Authorized by: Saud Romero Jr., MD     Consent Done?:  Yes (Written)  Time out: Immediately prior to procedure a "time out" was called to verify the correct patient, procedure, equipment, support staff and site/side marked as required.      STAFF:  Assistants?: Yes    List of assistants:  Jerry Interiano I was present for the entire procedure.  INDICATIONS:: Lipoma.  LOCATION:  Body area:  Upper leg / kneeleft    PREP:  Patient was " prepped and draped in the normal sterile fashion.: lateral.    ANESTHESIA:  Anesthesia:  Local infiltration  Local anesthetic:  Lidocaine 1% without epinephrine    PROCEDURE DETAILS:  Excision type:  Skin  Malignancy:  Benign  Excision size (cm):  3  Scalpel size:  15  Incision type:  Single straight  Specimens?: Yes    Specimens submitted to pathology. (Lipoma)  Hemostasis was obtained.  Estimated blood loss (cc):  3  Wound closure:  Intermediate layered  Wound repair size (cm):  3  Sutures:  3-0 Vicryl and 4-0 Monocryl  Sterile dressings:  Gauze, Mastisol, Steri-strips and Tegaderm  Post-op diagnosis: Same as pre-op diagnosis.  Needle, instrument, and sponge counts were correct.  Patient tolerated the procedure well with no immediate complications.  Post-operative instructions were provided for the patient.  Patient was discharged and will follow up for wound check and pathology results. and Patient was discharged and will follow up for wound check.

## 2019-05-21 DIAGNOSIS — E78.2 MIXED HYPERLIPIDEMIA: ICD-10-CM

## 2019-05-21 DIAGNOSIS — N13.8 BPH WITH URINARY OBSTRUCTION: ICD-10-CM

## 2019-05-21 DIAGNOSIS — N40.1 BPH WITH URINARY OBSTRUCTION: ICD-10-CM

## 2019-05-21 RX ORDER — TAMSULOSIN HYDROCHLORIDE 0.4 MG/1
CAPSULE ORAL
Qty: 90 CAPSULE | Refills: 0 | Status: SHIPPED | OUTPATIENT
Start: 2019-05-21 | End: 2019-11-07 | Stop reason: SDUPTHER

## 2019-05-22 RX ORDER — PRAVASTATIN SODIUM 80 MG/1
TABLET ORAL
Qty: 30 TABLET | Refills: 0 | Status: SHIPPED | OUTPATIENT
Start: 2019-05-22 | End: 2019-06-10 | Stop reason: SDUPTHER

## 2019-05-24 ENCOUNTER — OFFICE VISIT (OUTPATIENT)
Dept: SURGERY | Facility: CLINIC | Age: 69
End: 2019-05-24
Payer: MEDICARE

## 2019-05-24 VITALS
SYSTOLIC BLOOD PRESSURE: 113 MMHG | BODY MASS INDEX: 28.85 KG/M2 | HEIGHT: 72 IN | HEART RATE: 92 BPM | DIASTOLIC BLOOD PRESSURE: 73 MMHG | WEIGHT: 213 LBS

## 2019-05-24 DIAGNOSIS — Z09 POSTOP CHECK: Primary | ICD-10-CM

## 2019-05-24 PROCEDURE — 99999 PR PBB SHADOW E&M-EST. PATIENT-LVL III: CPT | Mod: PBBFAC,,, | Performed by: SURGERY

## 2019-05-24 PROCEDURE — 99024 PR POST-OP FOLLOW-UP VISIT: ICD-10-PCS | Mod: POP,,, | Performed by: SURGERY

## 2019-05-24 PROCEDURE — 99999 PR PBB SHADOW E&M-EST. PATIENT-LVL III: ICD-10-PCS | Mod: PBBFAC,,, | Performed by: SURGERY

## 2019-05-24 PROCEDURE — 99024 POSTOP FOLLOW-UP VISIT: CPT | Mod: POP,,, | Performed by: SURGERY

## 2019-05-24 PROCEDURE — 99213 OFFICE O/P EST LOW 20 MIN: CPT | Mod: PBBFAC | Performed by: SURGERY

## 2019-05-24 NOTE — PROGRESS NOTES
Subjective:       Herman Chirinos presents to the clinic 2 weeks following cyst removal x 2 chest and left hip. Eating a regular diet without difficulty. Bowel movements are Normal.  The patient is not having any pain..      Objective:      /73   Pulse 92   Ht 6' (1.829 m)   Wt 96.6 kg (213 lb)   BMI 28.89 kg/m²     General:  alert, appears stated age and cooperative       Incision:   healing well, no drainage, no erythema, incision well approximated        Pathology: 1., 2. FRAGMENTS OF BENIGN FIBROADIPOSE TISSUE, CONSISTENT WITH LIPOMA    Assessment:      Doing well postoperatively.      Plan:      - Ok to bath  - F/u PRN    Jerry Interiano MD  Surgery Resident, PGY-3  Pager [phone]  05/24/2019    I have personally taken the history and examined this patient and agree with the resident's note as stated above.         Saud Romero MD

## 2019-06-10 DIAGNOSIS — E78.2 MIXED HYPERLIPIDEMIA: ICD-10-CM

## 2019-06-10 RX ORDER — PRAVASTATIN SODIUM 80 MG/1
TABLET ORAL
Qty: 30 TABLET | Refills: 0 | Status: SHIPPED | OUTPATIENT
Start: 2019-06-10 | End: 2019-07-10 | Stop reason: SDUPTHER

## 2019-07-10 DIAGNOSIS — E78.2 MIXED HYPERLIPIDEMIA: ICD-10-CM

## 2019-07-10 RX ORDER — PRAVASTATIN SODIUM 80 MG/1
TABLET ORAL
Qty: 30 TABLET | Refills: 11 | Status: SHIPPED | OUTPATIENT
Start: 2019-07-10 | End: 2020-08-11

## 2019-08-23 ENCOUNTER — LAB VISIT (OUTPATIENT)
Dept: LAB | Facility: HOSPITAL | Age: 69
End: 2019-08-23
Payer: MEDICARE

## 2019-08-23 DIAGNOSIS — R73.03 PREDIABETES: ICD-10-CM

## 2019-08-23 LAB
ESTIMATED AVG GLUCOSE: 126 MG/DL (ref 68–131)
HBA1C MFR BLD HPLC: 6 % (ref 4–5.6)

## 2019-08-23 PROCEDURE — 36415 COLL VENOUS BLD VENIPUNCTURE: CPT

## 2019-08-23 PROCEDURE — 83036 HEMOGLOBIN GLYCOSYLATED A1C: CPT

## 2019-08-30 ENCOUNTER — TELEPHONE (OUTPATIENT)
Dept: INTERNAL MEDICINE | Facility: CLINIC | Age: 69
End: 2019-08-30

## 2019-08-30 DIAGNOSIS — R73.03 PREDIABETES: Primary | ICD-10-CM

## 2019-08-30 NOTE — TELEPHONE ENCOUNTER
----- Message from Rhys Morrison sent at 8/30/2019  9:25 AM CDT -----  Contact: pt   PT is calling for lab  results      Pt can be reached at [phone]

## 2019-08-30 NOTE — TELEPHONE ENCOUNTER
Which lab results is this in reference to?  Had routine labs in April and nursing either called or sent him a letter for this.

## 2019-09-03 NOTE — TELEPHONE ENCOUNTER
Do not see any lab results from 8/28/19.  Had A1c done 8/23/19 which returned at 6.0% down from 6.3% three months prior.  A1c improving and consistent with his history of prediabetes.  Recheck A1c in 6 months, ordered.  Recommend avoidance of simple sugars, reduction in intake of carbohydrates/starches, and daily aerobic exercise/weight loss.  No changes to medication regimen at this time.  Please notify patient of above.

## 2019-09-27 ENCOUNTER — PATIENT OUTREACH (OUTPATIENT)
Dept: ADMINISTRATIVE | Facility: OTHER | Age: 69
End: 2019-09-27

## 2019-10-01 ENCOUNTER — CLINICAL SUPPORT (OUTPATIENT)
Dept: OPHTHALMOLOGY | Facility: CLINIC | Age: 69
End: 2019-10-01
Payer: MEDICARE

## 2019-10-01 ENCOUNTER — OFFICE VISIT (OUTPATIENT)
Dept: OPTOMETRY | Facility: CLINIC | Age: 69
End: 2019-10-01
Payer: MEDICARE

## 2019-10-01 DIAGNOSIS — H40.1131 PRIMARY OPEN ANGLE GLAUCOMA OF BOTH EYES, MILD STAGE: ICD-10-CM

## 2019-10-01 DIAGNOSIS — H40.1131 PRIMARY OPEN ANGLE GLAUCOMA OF BOTH EYES, MILD STAGE: Primary | ICD-10-CM

## 2019-10-01 PROCEDURE — 99212 OFFICE O/P EST SF 10 MIN: CPT | Mod: PBBFAC,PO,25 | Performed by: OPTOMETRIST

## 2019-10-01 PROCEDURE — 92133 CPTRZD OPH DX IMG PST SGM ON: CPT | Mod: PBBFAC,PO

## 2019-10-01 PROCEDURE — 92012 INTRM OPH EXAM EST PATIENT: CPT | Mod: S$PBB,,, | Performed by: OPTOMETRIST

## 2019-10-01 PROCEDURE — 92133 CPTRZD OPH DX IMG PST SGM ON: CPT | Mod: 26,S$PBB,, | Performed by: OPTOMETRIST

## 2019-10-01 PROCEDURE — 99999 PR PBB SHADOW E&M-EST. PATIENT-LVL II: CPT | Mod: PBBFAC,,, | Performed by: OPTOMETRIST

## 2019-10-01 PROCEDURE — 92082 INTERMEDIATE VISUAL FIELD XM: CPT | Mod: PBBFAC,PO

## 2019-10-01 PROCEDURE — 99999 PR PBB SHADOW E&M-EST. PATIENT-LVL II: ICD-10-PCS | Mod: PBBFAC,,, | Performed by: OPTOMETRIST

## 2019-10-01 PROCEDURE — 92082 INTERMEDIATE VISUAL FIELD XM: CPT | Mod: 26,S$PBB,, | Performed by: OPTOMETRIST

## 2019-10-01 PROCEDURE — 92012 PR EYE EXAM, EST PATIENT,INTERMED: ICD-10-PCS | Mod: S$PBB,,, | Performed by: OPTOMETRIST

## 2019-10-01 PROCEDURE — 92133 POSTERIOR SEGMENT OCT OPTIC NERVE(OCULAR COHERENCE TOMOGRAPHY) - OU - BOTH EYES: ICD-10-PCS | Mod: 26,S$PBB,, | Performed by: OPTOMETRIST

## 2019-10-01 PROCEDURE — 92082 HUMPHREY VISUAL FIELD-OU-INTERMEDIATE-BOTH EYES: ICD-10-PCS | Mod: 26,S$PBB,, | Performed by: OPTOMETRIST

## 2019-10-01 RX ORDER — LATANOPROST 50 UG/ML
1 SOLUTION/ DROPS OPHTHALMIC NIGHTLY
Qty: 2.5 ML | Refills: 12 | Status: SHIPPED | OUTPATIENT
Start: 2019-10-01 | End: 2020-02-04 | Stop reason: SDUPTHER

## 2019-10-01 NOTE — PROGRESS NOTES
HPI     ERON 04/2019  Here for HVF,OCT and IOP check today.  Using Latanoprost OU   Q HS, last used last night.   Patient hasn't noticed any changes since   last visit.     Last edited by Erin Maldonado on 10/1/2019 11:15 AM. (History)            Assessment /Plan     For exam results, see Encounter Report.    Primary open angle glaucoma of both eyes, mild stage  -     latanoprost 0.005 % ophthalmic solution; Place 1 drop into both eyes every evening.  Dispense: 2.5 mL; Refill: 12      1. OCT stable, VF with false pos, IOP low and stable, nerve eval stable, HPI     ERON 04/2019  Here for HVF,OCT and IOP check today.  Using Latanoprost OU   Q HS, last used last night.   Patient hasn't noticed any changes since   last visit.     Last edited by Erin Maldonado on 10/1/2019 11:15 AM. (History)            Assessment /Plan     For exam results, see Encounter Report.    Primary open angle glaucoma of both eyes, mild stage  -     latanoprost 0.005 % ophthalmic solution; Place 1 drop into both eyes every evening.  Dispense: 2.5 mL; Refill: 12      1. IOP stable,  Cont Latanaprost drops, IOP fine for CD ratio, no change in nerve, IOP low, HVf poor reliability, Oct thin temp abnormal od and normal os. Prev IOP increased due to pt out of meds. Also prev slight increase in cup right eye.  Pachy thin. Prev Gonio open.  RTC 6 mos IOP ck. And DFE. .   2. Educated pt on presence of cataracts and effects on vision. No surgery at this time. Recheck in one year.  3. Cont with readers.

## 2019-10-29 ENCOUNTER — IMMUNIZATION (OUTPATIENT)
Dept: PHARMACY | Facility: CLINIC | Age: 69
End: 2019-10-29
Payer: MEDICARE

## 2019-11-07 DIAGNOSIS — N13.8 BPH WITH URINARY OBSTRUCTION: ICD-10-CM

## 2019-11-07 DIAGNOSIS — N40.1 BPH WITH URINARY OBSTRUCTION: ICD-10-CM

## 2019-11-07 RX ORDER — TAMSULOSIN HYDROCHLORIDE 0.4 MG/1
CAPSULE ORAL
Qty: 90 CAPSULE | Refills: 3 | Status: SHIPPED | OUTPATIENT
Start: 2019-11-07 | End: 2020-11-02 | Stop reason: SDUPTHER

## 2019-12-16 DIAGNOSIS — N52.9 ERECTILE DYSFUNCTION, UNSPECIFIED ERECTILE DYSFUNCTION TYPE: ICD-10-CM

## 2019-12-16 RX ORDER — SILDENAFIL 50 MG/1
TABLET, FILM COATED ORAL
Qty: 30 TABLET | Refills: 2 | Status: SHIPPED | OUTPATIENT
Start: 2019-12-16 | End: 2020-02-04 | Stop reason: SDUPTHER

## 2020-02-04 ENCOUNTER — OFFICE VISIT (OUTPATIENT)
Dept: INTERNAL MEDICINE | Facility: CLINIC | Age: 70
End: 2020-02-04
Payer: MEDICARE

## 2020-02-04 ENCOUNTER — TELEPHONE (OUTPATIENT)
Dept: NEUROLOGY | Facility: CLINIC | Age: 70
End: 2020-02-04

## 2020-02-04 ENCOUNTER — LAB VISIT (OUTPATIENT)
Dept: LAB | Facility: HOSPITAL | Age: 70
End: 2020-02-04
Payer: MEDICARE

## 2020-02-04 VITALS
WEIGHT: 218.06 LBS | SYSTOLIC BLOOD PRESSURE: 122 MMHG | DIASTOLIC BLOOD PRESSURE: 76 MMHG | HEART RATE: 65 BPM | TEMPERATURE: 98 F | HEIGHT: 72 IN | BODY MASS INDEX: 29.54 KG/M2

## 2020-02-04 DIAGNOSIS — R22.1 SUBCUTANEOUS MASS OF NECK: ICD-10-CM

## 2020-02-04 DIAGNOSIS — G25.2 ACTION TREMOR: ICD-10-CM

## 2020-02-04 DIAGNOSIS — H40.1131 PRIMARY OPEN ANGLE GLAUCOMA OF BOTH EYES, MILD STAGE: ICD-10-CM

## 2020-02-04 DIAGNOSIS — N52.9 ERECTILE DYSFUNCTION, UNSPECIFIED ERECTILE DYSFUNCTION TYPE: ICD-10-CM

## 2020-02-04 LAB
BASOPHILS # BLD AUTO: 0.06 K/UL (ref 0–0.2)
BASOPHILS NFR BLD: 0.9 % (ref 0–1.9)
DIFFERENTIAL METHOD: ABNORMAL
EOSINOPHIL # BLD AUTO: 0.1 K/UL (ref 0–0.5)
EOSINOPHIL NFR BLD: 1.2 % (ref 0–8)
ERYTHROCYTE [DISTWIDTH] IN BLOOD BY AUTOMATED COUNT: 15 % (ref 11.5–14.5)
HCT VFR BLD AUTO: 44.8 % (ref 40–54)
HGB BLD-MCNC: 14.4 G/DL (ref 14–18)
IMM GRANULOCYTES # BLD AUTO: 0.02 K/UL (ref 0–0.04)
IMM GRANULOCYTES NFR BLD AUTO: 0.3 % (ref 0–0.5)
LYMPHOCYTES # BLD AUTO: 1.7 K/UL (ref 1–4.8)
LYMPHOCYTES NFR BLD: 24.1 % (ref 18–48)
MCH RBC QN AUTO: 29.8 PG (ref 27–31)
MCHC RBC AUTO-ENTMCNC: 32.1 G/DL (ref 32–36)
MCV RBC AUTO: 93 FL (ref 82–98)
MONOCYTES # BLD AUTO: 0.7 K/UL (ref 0.3–1)
MONOCYTES NFR BLD: 9.6 % (ref 4–15)
NEUTROPHILS # BLD AUTO: 4.4 K/UL (ref 1.8–7.7)
NEUTROPHILS NFR BLD: 63.9 % (ref 38–73)
NRBC BLD-RTO: 0 /100 WBC
PLATELET # BLD AUTO: 246 K/UL (ref 150–350)
PMV BLD AUTO: 10.3 FL (ref 9.2–12.9)
RBC # BLD AUTO: 4.83 M/UL (ref 4.6–6.2)
WBC # BLD AUTO: 6.84 K/UL (ref 3.9–12.7)

## 2020-02-04 PROCEDURE — 80048 BASIC METABOLIC PNL TOTAL CA: CPT

## 2020-02-04 PROCEDURE — 85025 COMPLETE CBC W/AUTO DIFF WBC: CPT

## 2020-02-04 PROCEDURE — 99999 PR PBB SHADOW E&M-EST. PATIENT-LVL IV: ICD-10-PCS | Mod: PBBFAC,,, | Performed by: FAMILY MEDICINE

## 2020-02-04 PROCEDURE — 36415 COLL VENOUS BLD VENIPUNCTURE: CPT

## 2020-02-04 PROCEDURE — 99214 PR OFFICE/OUTPT VISIT, EST, LEVL IV, 30-39 MIN: ICD-10-PCS | Mod: S$PBB,,, | Performed by: FAMILY MEDICINE

## 2020-02-04 PROCEDURE — 99214 OFFICE O/P EST MOD 30 MIN: CPT | Mod: S$PBB,,, | Performed by: FAMILY MEDICINE

## 2020-02-04 PROCEDURE — 84443 ASSAY THYROID STIM HORMONE: CPT

## 2020-02-04 PROCEDURE — 99999 PR PBB SHADOW E&M-EST. PATIENT-LVL IV: CPT | Mod: PBBFAC,,, | Performed by: FAMILY MEDICINE

## 2020-02-04 PROCEDURE — 99214 OFFICE O/P EST MOD 30 MIN: CPT | Mod: PBBFAC | Performed by: FAMILY MEDICINE

## 2020-02-04 RX ORDER — SILDENAFIL 50 MG/1
TABLET, FILM COATED ORAL
Qty: 30 TABLET | Refills: 2 | Status: SHIPPED | OUTPATIENT
Start: 2020-02-04 | End: 2020-05-01

## 2020-02-04 RX ORDER — LATANOPROST 50 UG/ML
1 SOLUTION/ DROPS OPHTHALMIC NIGHTLY
Qty: 2.5 ML | Refills: 12 | Status: SHIPPED | OUTPATIENT
Start: 2020-02-04 | End: 2021-02-25 | Stop reason: SDUPTHER

## 2020-02-04 NOTE — TELEPHONE ENCOUNTER
----- Message from Adi Veronica sent at 2/4/2020  3:53 PM CST -----  Contact: @ [phone]  Caller calling to schedule a NP appt from the referral, pls contact patient

## 2020-02-04 NOTE — PROGRESS NOTES
Subjective:      Patient ID: Herman Chirinos is a 69 y.o. male.    Chief Complaint: Shaking (both hands off in on)      HPI:  Herman Chirinos is a 69 year old male with BPH, chronic rhinitis, erectile dysfunction, glaucoma, hyperlipidemia, history of impaired fasting glucose, lumbar stenosis, obesity, osteoarthritis bilateral knees, prediabetes, and vitamin D deficiency who presents to clinic today with a chief complaint of shaking hands.    States he has had tremors to his bilateral hands intermittently.  First noticed in 2017.  States he notices the tremor when he is trying to hold things like paper or trying to manipulate things or when holding a cup of coffee.  Has to set papers down when reading in front of his [employer] due to this tremor.  Denies any history of seizures or seizure like activity.  Denies tremors elsewhere in the body.  Denies fevers/chills, headaches, vision changes, difficulty ambulating, falls, numbness, tingling, or weakness.  Denies alcohol use.  Denies smoking.    Endorses presence of a subcutaneous mass to left posterior neck.  Present for about a year.  Denies noticing any changes, associated pain, itching, exudate.      Requests refills on Viagra and latanoprost.      Past Medical History:   Diagnosis Date    Appendicitis     Benign neoplasm of colon     Cataract     Chronic rhinitis     Colon polyps     Glaucoma     Hyperlipidemia     IFG (impaired fasting glucose) 1/8/2016    Lumbar stenosis     Osteoarthritis of both knees 5/24/2018    Tubular adenoma of colon: 4/16 repeat 2021 4/7/2017       Past Surgical History:   Procedure Laterality Date    APPENDECTOMY      COLONOSCOPY  2007    repeat recommended in five years    COLONOSCOPY N/A 4/7/2016    Procedure: COLONOSCOPY;  Surgeon: Luis Yusuf MD;  Location: [facility] ([address] [place] [address]);  Service: Endoscopy;  Laterality: N/A;  last procedure with Janell, patient requesting very early appt       Family History    Problem Relation Age of Onset    Coronary artery disease Mother     Hypertension Mother     Heart disease Mother         heart transplant    Diabetes Mellitus Father     Diabetes Father     Kidney disease Sister     No Known Problems Brother     No Known Problems Daughter     No Known Problems Sister     No Known Problems Brother     No Known Problems Brother     No Known Problems Son     No Known Problems Son     No Known Problems Son     No Known Problems Maternal Aunt     No Known Problems Maternal Uncle     No Known Problems Paternal Aunt     No Known Problems Paternal Uncle     No Known Problems Maternal Grandmother     No Known Problems Maternal Grandfather     No Known Problems Paternal Grandmother     No Known Problems Paternal Grandfather     No Known Problems Daughter     No Known Problems Daughter     Cancer Neg Hx     Stroke Neg Hx     Glaucoma Neg Hx     Cataracts Neg Hx     Blindness Neg Hx     Amblyopia Neg Hx     Macular degeneration Neg Hx     Retinal detachment Neg Hx     Strabismus Neg Hx     Thyroid disease Neg Hx     Psoriasis Neg Hx        Social History     Socioeconomic History    Marital status:      Spouse name: Not on file    Number of children: 6    Years of education: Not on file    Highest education level: Not on file   Occupational History    Occupation: [employer]     Employer: [employer]     Comment: shell   Social Needs    Financial resource strain: Not on file    Food insecurity:     Worry: Not on file     Inability: Not on file    Transportation needs:     Medical: Not on file     Non-medical: Not on file   Tobacco Use    Smoking status: Never Smoker    Smokeless tobacco: Never Used   Substance and Sexual Activity    Alcohol use: No    Drug use: No    Sexual activity: Yes     Partners: Female   Lifestyle    Physical activity:     Days per week: Not on file     Minutes per session: Not on file    Stress: Not on file    Relationships    Social connections:     Talks on phone: Not on file     Gets together: Not on file     Attends Gnosticism service: Not on file     Active member of club or organization: Not on file     Attends meetings of clubs or organizations: Not on file     Relationship status: Not on file   Other Topics Concern    Not on file   Social History Narrative    Not on file       Review of Systems   Constitutional: Negative for chills, fatigue and fever.   HENT: Negative for congestion, hearing loss, nosebleeds, rhinorrhea, sore throat and trouble swallowing.    Eyes: Negative for pain and visual disturbance.   Respiratory: Negative for cough, shortness of breath and wheezing.    Cardiovascular: Negative for chest pain and palpitations.   Gastrointestinal: Negative for abdominal distention, abdominal pain, constipation, diarrhea, nausea and vomiting.   Genitourinary: Negative for difficulty urinating, dysuria, frequency, hematuria and urgency.   Musculoskeletal: Negative for arthralgias, back pain and myalgias.   Skin: Negative for color change and rash.        + subcutaneous mass of neck   Neurological: Positive for tremors. Negative for dizziness, syncope, speech difficulty, weakness, numbness and headaches.   Psychiatric/Behavioral: Negative for agitation, behavioral problems and confusion. The patient is not nervous/anxious.      Objective:     Vitals:    02/04/20 1508   BP: 122/76   BP Location: Right arm   Patient Position: Sitting   BP Method: Large (Manual)   Pulse: 65   Temp: 97.8 °F (36.6 °C)   Weight: 98.9 kg (218 lb 0.6 oz)   Height: 6' (1.829 m)       Physical Exam   Constitutional: He appears well-developed and well-nourished. He is cooperative. No distress.   HENT:   Head: Normocephalic and atraumatic.   Right Ear: Hearing and external ear normal.   Left Ear: Hearing and external ear normal.   Nose: Nose normal. No rhinorrhea. No epistaxis.   Mouth/Throat: Oropharynx is clear and moist and mucous  membranes are normal. No oral lesions.   Eyes: Pupils are equal, round, and reactive to light. Conjunctivae, EOM and lids are normal. Right eye exhibits no discharge. Left eye exhibits no discharge.   Neck: Trachea normal and normal range of motion. Neck supple. No tracheal deviation present.       Cardiovascular: Normal rate, regular rhythm and normal heart sounds. Exam reveals no gallop and no friction rub.   No murmur heard.  Pulmonary/Chest: Effort normal and breath sounds normal. No respiratory distress. He has no wheezes. He has no rales.   Abdominal: Soft. Bowel sounds are normal. He exhibits no distension. There is no tenderness. There is no rebound and no guarding.   Musculoskeletal: He exhibits no deformity.   Neurological: He is alert. He displays tremor. No cranial nerve deficit. He exhibits normal muscle tone.   Reflex Scores:       Bicep reflexes are 2+ on the right side and 1+ on the left side.       Patellar reflexes are 1+ on the right side and 1+ on the left side.  Action tremor noted when holding paper, right worse than left.  Finger to nose testing normal without significant past-pointing.     Skin: Skin is warm and dry. No rash noted.   Psychiatric: He has a normal mood and affect. His speech is normal and behavior is normal. Judgment and thought content normal. Cognition and memory are normal.   Nursing note and vitals reviewed.     Assessment:      1. Action tremor    2. Subcutaneous mass of neck    3. Primary open angle glaucoma of both eyes, mild stage    4. Erectile dysfunction, unspecified erectile dysfunction type      Plan:   Herman was seen today for shaking.    Diagnoses and all orders for this visit:    Action tremor  -     Basic metabolic panel; Future  -     TSH; Future  -     Ambulatory referral/consult to Neurology; Future for further evaluation and management    Subcutaneous mass of neck  -     CBC auto differential; Future  -     US Soft Tissue Head Neck Thyroid;  Future    Primary open angle glaucoma of both eyes, mild stage  -     latanoprost 0.005 % ophthalmic solution; Place 1 drop into both eyes every evening, refilled.    Erectile dysfunction, unspecified erectile dysfunction type  -     sildenafil (VIAGRA) 50 MG tablet; TAKE 1 TABLET(50 MG) BY MOUTH DAILY AS NEEDED FOR ERECTILE DYSFUNCTION, refilled.

## 2020-02-05 ENCOUNTER — HOSPITAL ENCOUNTER (OUTPATIENT)
Dept: RADIOLOGY | Facility: HOSPITAL | Age: 70
Discharge: HOME | End: 2020-02-05
Attending: FAMILY MEDICINE
Payer: MEDICARE

## 2020-02-05 DIAGNOSIS — R22.1 SUBCUTANEOUS MASS OF NECK: ICD-10-CM

## 2020-02-05 LAB
ANION GAP SERPL CALC-SCNC: 7 MMOL/L (ref 8–16)
BUN SERPL-MCNC: 12 MG/DL (ref 8–23)
CALCIUM SERPL-MCNC: 9 MG/DL (ref 8.7–10.5)
CHLORIDE SERPL-SCNC: 103 MMOL/L (ref 95–110)
CO2 SERPL-SCNC: 28 MMOL/L (ref 23–29)
CREAT SERPL-MCNC: 1 MG/DL (ref 0.5–1.4)
EST. GFR  (AFRICAN AMERICAN): >60 ML/MIN/1.73 M^2
EST. GFR  (NON AFRICAN AMERICAN): >60 ML/MIN/1.73 M^2
GLUCOSE SERPL-MCNC: 95 MG/DL (ref 70–110)
POTASSIUM SERPL-SCNC: 3.9 MMOL/L (ref 3.5–5.1)
SODIUM SERPL-SCNC: 138 MMOL/L (ref 136–145)
TSH SERPL DL<=0.005 MIU/L-ACNC: 0.73 UIU/ML (ref 0.4–4)

## 2020-02-05 PROCEDURE — 76536 US SOFT TISSUE HEAD NECK THYROID: ICD-10-PCS | Mod: 26,,, | Performed by: RADIOLOGY

## 2020-02-05 PROCEDURE — 76536 US EXAM OF HEAD AND NECK: CPT | Mod: 26,,, | Performed by: RADIOLOGY

## 2020-02-05 PROCEDURE — 76536 US EXAM OF HEAD AND NECK: CPT | Mod: TC

## 2020-02-06 ENCOUNTER — TELEPHONE (OUTPATIENT)
Dept: INTERNAL MEDICINE | Facility: CLINIC | Age: 70
End: 2020-02-06

## 2020-02-06 DIAGNOSIS — R22.9 SUBCUTANEOUS MASS: Primary | ICD-10-CM

## 2020-02-06 NOTE — TELEPHONE ENCOUNTER
Left message for pt to call back.    Labs (CBC, BMP, and TSH) unremarkable for any clinically significant abnormalities.     US showed neck mass to most likely be an unencapsulated lipoma which is a benign lesion that does not require any particular follow up or intervention.  If patient would like to discuss removal of this mass with surgery please let me know and I will place referral.

## 2020-02-06 NOTE — TELEPHONE ENCOUNTER
Spoke with pt, notified him of results, pt verbalized understanding. Pt would like to see general surgery, please, put in referral.

## 2020-02-06 NOTE — TELEPHONE ENCOUNTER
----- Message from Meng Peña MD sent at 2/6/2020 10:36 AM CST -----  Please notify patient of the following:    US showed neck mass to most likely be an unencapsulated lipoma which is a benign lesion that does not require any particular follow up or intervention.  If patient would like to discuss removal of this mass with surgery please let me know and I will place referral.

## 2020-02-18 ENCOUNTER — OFFICE VISIT (OUTPATIENT)
Dept: SURGERY | Facility: CLINIC | Age: 70
End: 2020-02-18
Payer: MEDICARE

## 2020-02-18 VITALS
HEIGHT: 72 IN | SYSTOLIC BLOOD PRESSURE: 120 MMHG | HEART RATE: 79 BPM | BODY MASS INDEX: 29.53 KG/M2 | WEIGHT: 218 LBS | DIASTOLIC BLOOD PRESSURE: 75 MMHG

## 2020-02-18 DIAGNOSIS — E66.3 OVERWEIGHT (BMI 25.0-29.9): ICD-10-CM

## 2020-02-18 DIAGNOSIS — E78.2 MIXED HYPERLIPIDEMIA: ICD-10-CM

## 2020-02-18 DIAGNOSIS — R22.9 SUBCUTANEOUS MASS: Primary | ICD-10-CM

## 2020-02-18 DIAGNOSIS — N40.0 BENIGN NODULAR PROSTATIC HYPERPLASIA WITHOUT LOWER URINARY TRACT SYMPTOMS: ICD-10-CM

## 2020-02-18 DIAGNOSIS — R73.01 IFG (IMPAIRED FASTING GLUCOSE): ICD-10-CM

## 2020-02-18 PROCEDURE — 99213 PR OFFICE/OUTPT VISIT, EST, LEVL III, 20-29 MIN: ICD-10-PCS | Mod: S$PBB,,, | Performed by: SURGERY

## 2020-02-18 PROCEDURE — 99213 OFFICE O/P EST LOW 20 MIN: CPT | Mod: PBBFAC | Performed by: SURGERY

## 2020-02-18 PROCEDURE — 99999 PR PBB SHADOW E&M-EST. PATIENT-LVL III: CPT | Mod: PBBFAC,,, | Performed by: SURGERY

## 2020-02-18 PROCEDURE — 99213 OFFICE O/P EST LOW 20 MIN: CPT | Mod: S$PBB,,, | Performed by: SURGERY

## 2020-02-18 PROCEDURE — 99999 PR PBB SHADOW E&M-EST. PATIENT-LVL III: ICD-10-PCS | Mod: PBBFAC,,, | Performed by: SURGERY

## 2020-02-18 NOTE — PROGRESS NOTES
Consult  Surgery      SUBJECTIVE:     Reason for Consult: posterior neck mass    History of Present Illness: Herman Chirinos is a 69 y.o. male with BPH, chronic rhinitis, erectile dysfunction, glaucoma, hyperlipidemia, history of impaired fasting glucose, lumbar stenosis, obesity, osteoarthritis bilateral knees, prediabetes, and vitamin D deficiency presenting for evaluation of subcutaneous mass on his neck.     Noticed a lump on his posterior neck, left of midline, about a year ago. Does not feel it has changed in size, not painful, denies redness or drainage in the past.   He is otherwise feeling in his usual state of health, he is not particularly bothered by this mass but he wanted to have it checked out    Has a history of prior lipoma excisions LLE and torso by Dr. Romero (path B9 lipoma)    Current Outpatient Medications on File Prior to Visit   Medication Sig    aspirin 81 MG Chew Take 1 tablet (81 mg total) by mouth once daily.    cholecalciferol, vitamin D3, 1,000 unit capsule Take 2 capsules (2,000 Units total) by mouth once daily. (Patient not taking: Reported on 2/4/2020)    latanoprost 0.005 % ophthalmic solution Place 1 drop into both eyes every evening.    loratadine (CLARITIN) 10 mg tablet Take 1 tablet (10 mg total) by mouth once daily.    pravastatin (PRAVACHOL) 80 MG tablet TAKE 1 TABLET(80 MG) BY MOUTH EVERY DAY    sildenafil (VIAGRA) 50 MG tablet TAKE 1 TABLET(50 MG) BY MOUTH DAILY AS NEEDED FOR ERECTILE DYSFUNCTION    tamsulosin (FLOMAX) 0.4 mg Cap TAKE 1 CAPSULE(0.4 MG) BY MOUTH EVERY EVENING     No current facility-administered medications on file prior to visit.        Review of patient's allergies indicates:  No Known Allergies    Past Medical History:   Diagnosis Date    Appendicitis     Benign neoplasm of colon     Cataract     Chronic rhinitis     Colon polyps     Glaucoma     Hyperlipidemia     IFG (impaired fasting glucose) 1/8/2016    Lumbar stenosis      Osteoarthritis of both knees 5/24/2018    Tubular adenoma of colon: 4/16 repeat 2021 4/7/2017     Past Surgical History:   Procedure Laterality Date    APPENDECTOMY      COLONOSCOPY  2007    repeat recommended in five years    COLONOSCOPY N/A 4/7/2016    Procedure: COLONOSCOPY;  Surgeon: Luis Yusuf MD;  Location: [facility] ([address] [place] [address]);  Service: Endoscopy;  Laterality: N/A;  last procedure with Maciel, patient requesting very early appt     Family History   Problem Relation Age of Onset    Coronary artery disease Mother     Hypertension Mother     Heart disease Mother         heart transplant    Diabetes Mellitus Father     Diabetes Father     Kidney disease Sister     No Known Problems Brother     No Known Problems Daughter     No Known Problems Sister     No Known Problems Brother     No Known Problems Brother     No Known Problems Son     No Known Problems Son     No Known Problems Son     No Known Problems Maternal Aunt     No Known Problems Maternal Uncle     No Known Problems Paternal Aunt     No Known Problems Paternal Uncle     No Known Problems Maternal Grandmother     No Known Problems Maternal Grandfather     No Known Problems Paternal Grandmother     No Known Problems Paternal Grandfather     No Known Problems Daughter     No Known Problems Daughter     Cancer Neg Hx     Stroke Neg Hx     Glaucoma Neg Hx     Cataracts Neg Hx     Blindness Neg Hx     Amblyopia Neg Hx     Macular degeneration Neg Hx     Retinal detachment Neg Hx     Strabismus Neg Hx     Thyroid disease Neg Hx     Psoriasis Neg Hx      Social History     Tobacco Use    Smoking status: Never Smoker    Smokeless tobacco: Never Used   Substance Use Topics    Alcohol use: No    Drug use: No        Review of Systems  Otherwise negative except as listed above    OBJECTIVE:     Vital Signs (Most Recent)  Pulse: 79 (02/18/20 1621)  BP: 120/75 (02/18/20 1621)    Physical Exam  A&O, NAD  RRR  No  Respiratory Distress  Posterior neck with a small mobile fatty mass, approximately 2cm, c/w lipoma  ABD:    Laboratory  none    Diagnostic Results:  U/S 2/5/2020    FINDINGS:  There is a nonspecific hypoechoic area measuring 2.3 x 0.5 x 2.1 cm corresponding to the previously noted area of concern.  On the prior MRI about the expected area the mass there is an area of T2 focal T2 hyperintense signal compatible with fat which is slightly more prominent than on the contralateral side.  This may represent a small on an capsulated lipoma.      Impression       Lesion in question felt to most likely reflect an unencapsulated lipoma.  If the lesion is growing or becomes painful, further evaluation with dedicated MRI of the soft tissues of the neck with and without contrast would be indicated.         ASSESSMENT/PLAN:     A/P:  Herman Chirinos is a 69 y.o. male with lipoma on the posterior neck to the left of midline. We discussed the diagnosis and rationale for removal. After talking about it he does not desire excision at this time, I assured him that if it becomes more bothersome he can return to clinic to discuss removal in minors    - RTC PRN    Mario Santo MD   Pager: [phone]  General Surgery PGY-III  [facility]

## 2020-02-18 NOTE — LETTER
February 19, 2020      Meng Peña MD  [address]  [place] [address]           [facility] - General Surgery  [address]  [facility] [address]  Phone: [phone]          Patient: Herman Chirinos   MR Number: [id number]   YOB: 1950   Date of Visit: 2/18/2020       Dear Dr. Meng Peña:    Thank you for referring Herman Chirinos to me for evaluation. Attached you will find relevant portions of my assessment and plan of care.    If you have questions, please do not hesitate to call me. I look forward to following Herman Chirinos along with you.    Sincerely,    Melida Rahman MD    Enclosure  CC:  No Recipients    If you would like to receive this communication electronically, please contact [email or web address] or [phone] to request more information on [employer] access.    For providers and/or their staff who would like to refer a patient to [facility], please contact us through our one-stop-shop provider referral line, [facility] , at [phone].    If you feel you have received this communication in error or would no longer like to receive these types of communications, please e-mail [email or web address]          ambulate

## 2020-02-18 NOTE — PATIENT INSTRUCTIONS
Understanding a Lipoma    A lipoma is a benign lump under the skin thats made of fat. Its not cancer. It feels soft like rubber when you press it, and in most cases it doesnt hurt. Some people have more than one. A lipoma grows slowly over time and doesnt cause many problems. Lipomas occur most often in adults from ages 40 to 60, and more often in men.  How to say it  Ly-POH-marty   What causes a lipoma?  The cause is not yet known. Experts are still learning more. It may be partly caused by a problem in a gene. They can run in families. Familial multiple lipomatosis is when 2 or more family members have many lipomas.  Symptoms of a lipoma  The main symptom of a lipoma is a soft lump under the skin that doesnt hurt unless it is pressing on a nerve. It may be small, around 1/4 inch across. Or it may be larger, up to 4 inches across or more.  There are different kinds of lipomas. The most common kind occurs under the skin of the shoulders, chest, back, belly, or under the arms. In some cases, a lipoma can occur on the legs. In rare cases, one may occur deeper in the body or in a muscle.  Treatment for a lipoma  In most cases, a lipoma doesnt need treatment. Your healthcare provider may look at it during regular checkups to see if it changes.  But if the lipoma is painful or you want it removed for cosmetic reasons, it can be removed with surgery. The surgery is called excision. The lipoma will most likely not grow back after surgery. During surgery, the area around the lipoma is numbed. If you have a deep lipoma, you may need medicine called regional anesthesia to numb a larger area. Or you may need medicine called general anesthesia to put you to sleep during the procedure. Then the doctor makes a cut over the area of the lipoma. He or she removes the lump of fat. The cut is then closed with stitches.  Possible complications of a lipoma  A large lipoma inside the body can press on organs, nerves, or other  tissues and cause problems. For example, it can cause problems with breathing or digestion.  Living with a lipoma  Your healthcare provider may look at the lipoma during regular checkups to see if it changes or is causing problems.  When to call your healthcare provider  Call your healthcare provider right away if you have any of these:  · Lipoma that grows quickly, causes pain, or feels hard  · Growth of new lipomas   Date Last Reviewed: 5/1/2016  © 5511-7880 [employer]. [address] [place] [address]. All rights reserved. This information is not intended as a substitute for professional medical care. Always follow your healthcare professional's instructions.

## 2020-03-02 ENCOUNTER — LAB VISIT (OUTPATIENT)
Dept: LAB | Facility: HOSPITAL | Age: 70
End: 2020-03-02
Attending: FAMILY MEDICINE
Payer: MEDICARE

## 2020-03-02 DIAGNOSIS — R73.03 PREDIABETES: ICD-10-CM

## 2020-03-02 LAB
ESTIMATED AVG GLUCOSE: 126 MG/DL (ref 68–131)
HBA1C MFR BLD HPLC: 6 % (ref 4–5.6)

## 2020-03-02 PROCEDURE — 36415 COLL VENOUS BLD VENIPUNCTURE: CPT

## 2020-03-02 PROCEDURE — 83036 HEMOGLOBIN GLYCOSYLATED A1C: CPT

## 2020-03-10 ENCOUNTER — TELEPHONE (OUTPATIENT)
Dept: INTERNAL MEDICINE | Facility: CLINIC | Age: 70
End: 2020-03-10

## 2020-03-10 DIAGNOSIS — R73.03 PREDIABETES: Primary | ICD-10-CM

## 2020-03-11 ENCOUNTER — TELEPHONE (OUTPATIENT)
Dept: INTERNAL MEDICINE | Facility: CLINIC | Age: 70
End: 2020-03-11

## 2020-03-11 NOTE — TELEPHONE ENCOUNTER
Meng Peña MD  P Nurse Mariana             Please notify patient of the following:     A1c stable at 6.0% and within goal.  Recommend he continue dietary/lifestyle modifications and recheck A1c again in 6 months.

## 2020-03-12 NOTE — TELEPHONE ENCOUNTER
----- Message from Reshma Luna MA sent at 3/12/2020  9:54 AM CDT -----  Contact: self   Please call pt back. Thanks.   ----- Message -----  From: Susan Mir  Sent: 3/12/2020   9:49 AM CDT  To: [facility] Staff    Patient is returning a phone call.  Who left a message for the patient: Vanessa Gutierrez LPN   Does patient know what this is regarding:    Comments:

## 2020-05-01 DIAGNOSIS — N52.9 ERECTILE DYSFUNCTION, UNSPECIFIED ERECTILE DYSFUNCTION TYPE: ICD-10-CM

## 2020-05-01 RX ORDER — SILDENAFIL 50 MG/1
TABLET, FILM COATED ORAL
Qty: 30 TABLET | Refills: 2 | Status: SHIPPED | OUTPATIENT
Start: 2020-05-01 | End: 2020-05-07

## 2020-05-07 DIAGNOSIS — N52.9 ERECTILE DYSFUNCTION, UNSPECIFIED ERECTILE DYSFUNCTION TYPE: ICD-10-CM

## 2020-05-07 RX ORDER — SILDENAFIL 50 MG/1
TABLET, FILM COATED ORAL
Qty: 30 TABLET | Refills: 2 | Status: SHIPPED | OUTPATIENT
Start: 2020-05-07 | End: 2020-05-20

## 2020-05-19 DIAGNOSIS — N52.9 ERECTILE DYSFUNCTION, UNSPECIFIED ERECTILE DYSFUNCTION TYPE: ICD-10-CM

## 2020-05-20 ENCOUNTER — TELEPHONE (OUTPATIENT)
Dept: NEUROLOGY | Facility: CLINIC | Age: 70
End: 2020-05-20

## 2020-05-20 RX ORDER — SILDENAFIL 50 MG/1
TABLET, FILM COATED ORAL
Qty: 30 TABLET | Refills: 2 | Status: SHIPPED | OUTPATIENT
Start: 2020-05-20 | End: 2020-07-06 | Stop reason: DRUGHIGH

## 2020-05-20 NOTE — TELEPHONE ENCOUNTER
Spoke with  Taran, he was informed his appt will need to be converted to a virtual visit through the portal due to no in person appt at this time.  Taran voiced understanding. Username, password, instructions given to  Taran.

## 2020-05-21 ENCOUNTER — OFFICE VISIT (OUTPATIENT)
Dept: NEUROLOGY | Facility: CLINIC | Age: 70
End: 2020-05-21
Payer: MEDICARE

## 2020-05-21 ENCOUNTER — TELEPHONE (OUTPATIENT)
Dept: NEUROLOGY | Facility: CLINIC | Age: 70
End: 2020-05-21

## 2020-05-21 DIAGNOSIS — R25.1 TREMOR: Primary | ICD-10-CM

## 2020-05-21 PROCEDURE — 99214 OFFICE O/P EST MOD 30 MIN: CPT | Mod: 95,,, | Performed by: NURSE PRACTITIONER

## 2020-05-21 PROCEDURE — 99214 PR OFFICE/OUTPT VISIT, EST, LEVL IV, 30-39 MIN: ICD-10-PCS | Mod: 95,,, | Performed by: NURSE PRACTITIONER

## 2020-05-21 NOTE — LETTER
May 21, 2020      Meng Peña MD  [address]  [facility] [address]           [facility]  Neurology  [address]  [facility] [address]  Phone: [phone]  Fax: [phone]          Patient: Herman Chirinos   MR Number: [id number]   YOB: 1950   Date of Visit: 5/21/2020       Dear Dr. Meng Peña:    Thank you for referring Herman Chirinos to me for evaluation. Attached you will find relevant portions of my assessment and plan of care.    If you have questions, please do not hesitate to call me. I look forward to following Herman Chirinos along with you.    Sincerely,    Shobha Coley NP    Enclosure  CC:  No Recipients    If you would like to receive this communication electronically, please contact [email or web address] or [phone] to request more information on [employer] Link access.    For providers and/or their staff who would like to refer a patient to [facility], please contact us through our one-stop-shop provider referral line, [facility], at [phone].    If you feel you have received this communication in error or would no longer like to receive these types of communications, please e-mail [email or web address]

## 2020-05-21 NOTE — TELEPHONE ENCOUNTER
----- Message from León Reynolds sent at 5/21/2020  2:27 PM CDT -----  Contact: pt @ [phone]  Pt surjit Yeager might not be able to join appt for 2:15 PM, pt states he's going back into the virtual waiting area

## 2020-05-21 NOTE — PROGRESS NOTES
"The patient location is:   The chief complaint leading to consultation is: tremor    Visit type: audio visual    Face to Face time with patient: 25 minutes of total time spent on the encounter, which includes face to face time and non-face to face time preparing to see the patient (eg, review of tests), Obtaining and/or reviewing separately obtained history, Documenting clinical information in the electronic or other health record, Independently interpreting results (not separately reported) and communicating results to the patient/family/caregiver, or Care coordination (not separately reported).         Each patient to whom he or she provides medical services by telemedicine is:  (1) informed of the relationship between the physician and patient and the respective role of any other health care provider with respect to management of the patient; and (2) notified that he or she may decline to receive medical services by telemedicine and may withdraw from such care at any time.    Notes:   He notes that he had some nodules on neck and had tests for this. He wondered if this is why he was nothing shaking in hands. Shaking started in late 2019 or early 2020, mainly notes in the RH as he is RH. He will notice in LH but not like the RH.      Describes it is "little shaking" and only when he stretches his hands out- like to hold a cup of hot coffee or grab something. When he tries not to shake, he is more aware of it. If he does not think about shaking, he really does not notice it.     When he does shake in front of others, he will adjust for it and maake it look like it was a part of what he was doing.     No trouble with handwriting at all.   No trouble with feeding self or shaving.   He is worried about spilling hot coffee on him so typically aware of shaking when drinks coffee but does not think it increases with caffeine.  He does not drink alcohol.      Shaking is not sontant. More whne using RH and purposely tyring " to helpd still. Like holding cup or reaching for soemthing.   He is not aware of tremor at rest- has to me a movement out in open away from his nbody to notice.   No trouble shaving.   No trouble with eating but if trying to put teasoppon into cup, he will shake.   Handwriting has not changed.     No trouble with walking, other than has aching in left knee.  No falls. Denies shuffle.    No FMH tremors or Parkinson's disease.        ROS:   General: appetite good, no weight loss  ENT: no drooling and no swallowing issues, no voice changes  GI: no constipation  : slow stream due to enlarged prostate  Musculoskeletal: L knee aches, no gait disturbance  Neuro: + tremor, no HA or dizziness and no change in speech  Psych: Sleep issues recently as worried about COVID    POOR CONNECTION AND SCREEN FREEZES PERIODICALLY making exam more challenging. He is also alone but is able to prop phone up so that we are able to do exam.   I actually do not appreciate any tremor today but due to the poor connection I am not confident that I would have picked up on tremor.     Face symmetric with normal strength.   Shoulder shrug normal.   Good historian.   OMERO normal- do not appreciate any bradykinesia but again there is poor connection    IMPRESSION: Tremor- describes in BH (R>L), suspect is essential tremor but truly cannot appreciate today due to poor connection on virtual visit.     PLAN: tremor is not bothering him enough to take medications at this point. I would like to see him in person in 6 months, sooner if tremor progresses or if it becomes more bothersome. He is agreeable.    ..  ..Shobha Coley, LARRY, NP-C

## 2020-07-06 ENCOUNTER — OFFICE VISIT (OUTPATIENT)
Dept: INTERNAL MEDICINE | Facility: CLINIC | Age: 70
End: 2020-07-06
Payer: MEDICARE

## 2020-07-06 ENCOUNTER — HOSPITAL ENCOUNTER (OUTPATIENT)
Dept: RADIOLOGY | Facility: HOSPITAL | Age: 70
Discharge: HOME | End: 2020-07-06
Attending: PHYSICIAN ASSISTANT
Payer: MEDICARE

## 2020-07-06 ENCOUNTER — OFFICE VISIT (OUTPATIENT)
Dept: ORTHOPEDICS | Facility: CLINIC | Age: 70
End: 2020-07-06
Payer: MEDICARE

## 2020-07-06 VITALS
TEMPERATURE: 98 F | HEIGHT: 72 IN | SYSTOLIC BLOOD PRESSURE: 103 MMHG | BODY MASS INDEX: 27.35 KG/M2 | DIASTOLIC BLOOD PRESSURE: 76 MMHG | HEART RATE: 67 BPM | WEIGHT: 201.94 LBS

## 2020-07-06 VITALS
BODY MASS INDEX: 27.29 KG/M2 | HEART RATE: 72 BPM | DIASTOLIC BLOOD PRESSURE: 70 MMHG | OXYGEN SATURATION: 99 % | HEIGHT: 72 IN | TEMPERATURE: 99 F | SYSTOLIC BLOOD PRESSURE: 100 MMHG | WEIGHT: 201.5 LBS

## 2020-07-06 DIAGNOSIS — N52.9 ERECTILE DYSFUNCTION, UNSPECIFIED ERECTILE DYSFUNCTION TYPE: ICD-10-CM

## 2020-07-06 DIAGNOSIS — M17.12 PRIMARY OSTEOARTHRITIS OF LEFT KNEE: ICD-10-CM

## 2020-07-06 DIAGNOSIS — M25.562 ACUTE PAIN OF LEFT KNEE: ICD-10-CM

## 2020-07-06 PROCEDURE — 99213 PR OFFICE/OUTPT VISIT, EST, LEVL III, 20-29 MIN: ICD-10-PCS | Mod: S$PBB,,, | Performed by: FAMILY MEDICINE

## 2020-07-06 PROCEDURE — 99214 OFFICE O/P EST MOD 30 MIN: CPT | Mod: PBBFAC,25 | Performed by: FAMILY MEDICINE

## 2020-07-06 PROCEDURE — 73562 XR KNEE ORTHO LEFT WITH FLEXION: ICD-10-PCS | Mod: 26,59,RT, | Performed by: RADIOLOGY

## 2020-07-06 PROCEDURE — 73564 X-RAY EXAM KNEE 4 OR MORE: CPT | Mod: 26,LT,, | Performed by: RADIOLOGY

## 2020-07-06 PROCEDURE — 73562 X-RAY EXAM OF KNEE 3: CPT | Mod: TC,RT

## 2020-07-06 PROCEDURE — 99999 PR PBB SHADOW E&M-EST. PATIENT-LVL IV: CPT | Mod: PBBFAC,,, | Performed by: FAMILY MEDICINE

## 2020-07-06 PROCEDURE — 99214 OFFICE O/P EST MOD 30 MIN: CPT | Mod: PBBFAC,25,27 | Performed by: PHYSICIAN ASSISTANT

## 2020-07-06 PROCEDURE — 99999 PR PBB SHADOW E&M-EST. PATIENT-LVL IV: ICD-10-PCS | Mod: PBBFAC,,, | Performed by: PHYSICIAN ASSISTANT

## 2020-07-06 PROCEDURE — 99203 OFFICE O/P NEW LOW 30 MIN: CPT | Mod: S$PBB,,, | Performed by: PHYSICIAN ASSISTANT

## 2020-07-06 PROCEDURE — 73564 XR KNEE ORTHO LEFT WITH FLEXION: ICD-10-PCS | Mod: 26,LT,, | Performed by: RADIOLOGY

## 2020-07-06 PROCEDURE — 99203 PR OFFICE/OUTPT VISIT, NEW, LEVL III, 30-44 MIN: ICD-10-PCS | Mod: S$PBB,,, | Performed by: PHYSICIAN ASSISTANT

## 2020-07-06 PROCEDURE — 99999 PR PBB SHADOW E&M-EST. PATIENT-LVL IV: ICD-10-PCS | Mod: PBBFAC,,, | Performed by: FAMILY MEDICINE

## 2020-07-06 PROCEDURE — 99999 PR PBB SHADOW E&M-EST. PATIENT-LVL IV: CPT | Mod: PBBFAC,,, | Performed by: PHYSICIAN ASSISTANT

## 2020-07-06 PROCEDURE — 99213 OFFICE O/P EST LOW 20 MIN: CPT | Mod: S$PBB,,, | Performed by: FAMILY MEDICINE

## 2020-07-06 PROCEDURE — 73562 X-RAY EXAM OF KNEE 3: CPT | Mod: 26,59,RT, | Performed by: RADIOLOGY

## 2020-07-06 RX ORDER — NAPROXEN 500 MG/1
500 TABLET ORAL 2 TIMES DAILY WITH MEALS
Qty: 28 TABLET | Refills: 0 | Status: SHIPPED | OUTPATIENT
Start: 2020-07-06 | End: 2020-08-05

## 2020-07-06 RX ORDER — SILDENAFIL 100 MG/1
100 TABLET, FILM COATED ORAL DAILY PRN
Qty: 6 TABLET | Refills: 11 | Status: SHIPPED | OUTPATIENT
Start: 2020-07-06 | End: 2020-11-02 | Stop reason: SDUPTHER

## 2020-07-06 NOTE — LETTER
July 7, 2020      Meng Peña MD  [address]  [facility] [address]           [facility]  [address]  [facility] [address]  Phone: [phone]  Fax: [phone]          Patient: Herman Chirinos   MR Number: [id number]   YOB: 1950   Date of Visit: 7/6/2020       Dear Dr. Meng Peña:    Thank you for referring Herman Chirinos to me for evaluation. Attached you will find relevant portions of my assessment and plan of care.    If you have questions, please do not hesitate to call me. I look forward to following Herman Chirinos along with you.    Sincerely,    Shima Keith PA-C    Enclosure  CC:  No Recipients    If you would like to receive this communication electronically, please contact [email or web address] or [phone] to request more information on [employer] Link access.    For providers and/or their staff who would like to refer a patient to [facility], please contact us through our one-stop-shop provider referral line, [facility] [place], at [phone].    If you feel you have received this communication in error or would no longer like to receive these types of communications, please e-mail [email or web address]

## 2020-07-06 NOTE — PROGRESS NOTES
Subjective:      Patient ID: Herman Chirinos is a 69 y.o. male.    Chief Complaint: Knee Injury (x5 days-Left) and Edema (knee-Left)      HPI:  Herman Chirinos is a 69 year old male with BPH, chronic rhinitis, erectile dysfunction, glaucoma, hyperlipidemia, history of impaired fasting glucose, lumbar stenosis, obesity, osteoarthritis bilateral knees, prediabetes, and vitamin D deficiency who presents to clinic today with a chief complaint of knee injury.    States 5 days ago he thinks he twisted his left knee while working in the yard.  Denies any known specific inciting event.  Endorses subsequent pain (rated at 6/10 in intensity), stiffness, swelling.  Having difficulty bending the knee.    Pain stable.  Denies associated erythema, numbness, tingling.  States he is limping.  Denies significant weakness but favoring the other leg due to stiffness/pain.  States he has tried Aleve which helps with the pain but not the stiffness.      Requests refill on Viagra.      Past Medical History:   Diagnosis Date    Appendicitis     Benign neoplasm of colon     Cataract     Chronic rhinitis     Colon polyps     Glaucoma     Hyperlipidemia     IFG (impaired fasting glucose) 1/8/2016    Lumbar stenosis     Osteoarthritis of both knees 5/24/2018    Tubular adenoma of colon: 4/16 repeat 2021 4/7/2017       Past Surgical History:   Procedure Laterality Date    APPENDECTOMY      COLONOSCOPY  2007    repeat recommended in five years    COLONOSCOPY N/A 4/7/2016    Procedure: COLONOSCOPY;  Surgeon: Luis Yusuf MD;  Location: [facility] ([address] [place] [address]);  Service: Endoscopy;  Laterality: N/A;  last procedure with Maciel, patient requesting very early appt       Family History   Problem Relation Age of Onset    Coronary artery disease Mother     Hypertension Mother     Heart disease Mother         heart transplant    Diabetes Mellitus Father     Diabetes Father     Kidney disease Sister     No Known Problems Brother      No Known Problems Daughter     No Known Problems Sister     No Known Problems Brother     No Known Problems Brother     No Known Problems Son     No Known Problems Son     No Known Problems Son     No Known Problems Maternal Aunt     No Known Problems Maternal Uncle     No Known Problems Paternal Aunt     No Known Problems Paternal Uncle     No Known Problems Maternal Grandmother     No Known Problems Maternal Grandfather     No Known Problems Paternal Grandmother     No Known Problems Paternal Grandfather     No Known Problems Daughter     No Known Problems Daughter     Cancer Neg Hx     Stroke Neg Hx     Glaucoma Neg Hx     Cataracts Neg Hx     Blindness Neg Hx     Amblyopia Neg Hx     Macular degeneration Neg Hx     Retinal detachment Neg Hx     Strabismus Neg Hx     Thyroid disease Neg Hx     Psoriasis Neg Hx     Tremor Neg Hx     Parkinsonism Neg Hx        Social History     Socioeconomic History    Marital status:      Spouse name: Not on file    Number of children: 6    Years of education: Not on file    Highest education level: Not on file   Occupational History    Occupation: [employer]     Employer: [employer]     Comment: [employer]   Social Needs    Financial resource strain: Not on file    Food insecurity     Worry: Not on file     Inability: Not on file    Transportation needs     Medical: Not on file     Non-medical: Not on file   Tobacco Use    Smoking status: Never Smoker    Smokeless tobacco: Never Used   Substance and Sexual Activity    Alcohol use: No    Drug use: No    Sexual activity: Yes     Partners: Female   Lifestyle    Physical activity     Days per week: Not on file     Minutes per session: Not on file    Stress: Not on file   Relationships    Social connections     Talks on phone: Not on file     Gets together: Not on file     Attends Hinduism service: Not on file     Active member of club or organization: Not on file      Attends meetings of clubs or organizations: Not on file     Relationship status: Not on file   Other Topics Concern    Not on file   Social History Narrative    Not on file       Review of Systems   Constitutional: Negative for chills, fatigue and fever.   HENT: Negative for congestion, hearing loss, nosebleeds, rhinorrhea, sore throat and trouble swallowing.    Eyes: Negative for pain and visual disturbance.   Respiratory: Negative for cough, shortness of breath and wheezing.    Cardiovascular: Negative for chest pain and palpitations.   Gastrointestinal: Negative for abdominal distention, abdominal pain, constipation, diarrhea, nausea and vomiting.   Genitourinary: Negative for difficulty urinating, dysuria, frequency, hematuria and urgency.   Musculoskeletal: Positive for arthralgias and joint swelling. Negative for back pain and myalgias.   Skin: Negative for color change and rash.   Neurological: Negative for dizziness, syncope, speech difficulty, weakness, numbness and headaches.   Psychiatric/Behavioral: Negative for agitation, behavioral problems and confusion. The patient is not nervous/anxious.      Objective:     Vitals:    07/06/20 1635   BP: 100/70   BP Location: Right arm   Patient Position: Sitting   BP Method: Medium (Manual)   Pulse: 72   Temp: 98.7 °F (37.1 °C)   TempSrc: Oral   SpO2: 99%   Weight: 91.4 kg (201 lb 8 oz)   Height: 6' (1.829 m)       Physical Exam  Vitals signs and nursing note reviewed.   Constitutional:       General: He is not in acute distress.     Appearance: He is well-developed.   HENT:      Head: Normocephalic and atraumatic.      Right Ear: Hearing and external ear normal.      Left Ear: Hearing and external ear normal.      Nose: Nose normal. No rhinorrhea.   Eyes:      General: Lids are normal.         Right eye: No discharge.         Left eye: No discharge.      Conjunctiva/sclera: Conjunctivae normal.      Pupils: Pupils are equal, round, and reactive to light.   Neck:       Musculoskeletal: Normal range of motion and neck supple.      Trachea: Trachea normal. No tracheal deviation.   Cardiovascular:      Rate and Rhythm: Normal rate and regular rhythm.      Heart sounds: Normal heart sounds. No murmur. No friction rub. No gallop.    Pulmonary:      Effort: Pulmonary effort is normal. No respiratory distress.      Breath sounds: Normal breath sounds. No wheezing or rales.   Abdominal:      General: Bowel sounds are normal. There is no distension.      Palpations: Abdomen is soft.      Tenderness: There is no abdominal tenderness. There is no guarding or rebound.   Musculoskeletal:         General: No deformity.      Left knee: He exhibits decreased range of motion and swelling. He exhibits no ecchymosis, no deformity, no laceration, no erythema, normal alignment, no LCL laxity, normal patellar mobility, no bony tenderness, normal meniscus and no MCL laxity. No tenderness found.      Comments: Limited extension and flexion of left knee.  Ambulating with significant limp.   Skin:     General: Skin is warm and dry.      Findings: No rash.   Neurological:      Mental Status: He is alert.      Motor: No abnormal muscle tone.   Psychiatric:         Speech: Speech normal.         Behavior: Behavior normal. Behavior is cooperative.         Thought Content: Thought content normal.         Judgment: Judgment normal.        Assessment:      1. Acute pain of left knee    2. Erectile dysfunction, unspecified erectile dysfunction type      Plan:   Herman was seen today for knee injury and edema.    Diagnoses and all orders for this visit:    Acute pain of left knee  -     X-Ray Knee 3 View Left; Future  -     Ambulatory referral/consult to Orthopedics; Future  -     No instability appreciated on exam.  Check X-ray.  Recommended he continue OTC NSAIDs with food PRN.  Follow up with orthopedics.    Erectile dysfunction, unspecified erectile dysfunction type  -     Continue sildenafiL (VIAGRA) 100  MG tablet; Take 1 tablet (100 mg total) by mouth daily as needed for Erectile Dysfunction, refilled.

## 2020-07-06 NOTE — PROGRESS NOTES
Subjective:      Patient ID: Herman Chirinos is a 69 y.o. male.    Chief Complaint: Pain and Swelling of the Left Knee    HPI  69 year old male presents with chief complaint of left knee pain x 4 days. He was cutting the grass and mis-stepped and twisted the knee. Pain is medial. It is worse when he bends it. He takes aleve with some relief. She reports swelling. He does not use assistive devices.   Review of Systems   Constitution: Negative for chills, fever and night sweats.   Cardiovascular: Negative for chest pain.   Respiratory: Negative for cough and shortness of breath.    Hematologic/Lymphatic: Does not bruise/bleed easily.   Skin: Negative for color change.   Gastrointestinal: Negative for heartburn.   Genitourinary: Negative for dysuria.   Neurological: Negative for numbness and paresthesias.   Psychiatric/Behavioral: Negative for altered mental status.   Allergic/Immunologic: Negative for persistent infections.         Objective:            General    Vitals reviewed.  Constitutional: He is oriented to person, place, and time. He appears well-developed and well-nourished.   Cardiovascular: Normal rate.    Neurological: He is alert and oriented to person, place, and time.             Left Knee Exam:  ROM 0-110 degrees  Small effusion  No warmth or erythema  TTP medial joint line      X-ray: ordered and reviewed by myself. DJD present.       Assessment:       Encounter Diagnosis   Name Primary?    Primary osteoarthritis of left knee           Plan:       Discussed treatment options with patient. He will take naproxen BID x 2 weeks. Ice and elevate. RTC prn.

## 2020-07-15 DIAGNOSIS — Z71.89 COMPLEX CARE COORDINATION: ICD-10-CM

## 2020-08-03 ENCOUNTER — OFFICE VISIT (OUTPATIENT)
Dept: ORTHOPEDICS | Facility: CLINIC | Age: 70
End: 2020-08-03
Payer: MEDICARE

## 2020-08-03 VITALS — HEIGHT: 72 IN | WEIGHT: 203.06 LBS | BODY MASS INDEX: 27.5 KG/M2

## 2020-08-03 DIAGNOSIS — M17.12 PRIMARY OSTEOARTHRITIS OF LEFT KNEE: Primary | ICD-10-CM

## 2020-08-03 PROCEDURE — 99213 PR OFFICE/OUTPT VISIT, EST, LEVL III, 20-29 MIN: ICD-10-PCS | Mod: 25,S$PBB,, | Performed by: PHYSICIAN ASSISTANT

## 2020-08-03 PROCEDURE — 99999 PR PBB SHADOW E&M-EST. PATIENT-LVL III: CPT | Mod: PBBFAC,,, | Performed by: PHYSICIAN ASSISTANT

## 2020-08-03 PROCEDURE — 20610 DRAIN/INJ JOINT/BURSA W/O US: CPT | Mod: PBBFAC | Performed by: PHYSICIAN ASSISTANT

## 2020-08-03 PROCEDURE — 20610 PR DRAIN/INJECT LARGE JOINT/BURSA: ICD-10-PCS | Mod: S$PBB,LT,, | Performed by: PHYSICIAN ASSISTANT

## 2020-08-03 PROCEDURE — 99999 PR PBB SHADOW E&M-EST. PATIENT-LVL III: ICD-10-PCS | Mod: PBBFAC,,, | Performed by: PHYSICIAN ASSISTANT

## 2020-08-03 PROCEDURE — 99213 OFFICE O/P EST LOW 20 MIN: CPT | Mod: PBBFAC | Performed by: PHYSICIAN ASSISTANT

## 2020-08-03 PROCEDURE — 20610 DRAIN/INJ JOINT/BURSA W/O US: CPT | Mod: S$PBB,LT,, | Performed by: PHYSICIAN ASSISTANT

## 2020-08-03 PROCEDURE — 99213 OFFICE O/P EST LOW 20 MIN: CPT | Mod: 25,S$PBB,, | Performed by: PHYSICIAN ASSISTANT

## 2020-08-03 RX ORDER — METHYLPREDNISOLONE ACETATE 80 MG/ML
80 INJECTION, SUSPENSION INTRA-ARTICULAR; INTRALESIONAL; INTRAMUSCULAR; SOFT TISSUE
Status: COMPLETED | OUTPATIENT
Start: 2020-08-03 | End: 2020-08-03

## 2020-08-03 RX ADMIN — METHYLPREDNISOLONE ACETATE 80 MG: 80 INJECTION, SUSPENSION INTRALESIONAL; INTRAMUSCULAR; INTRASYNOVIAL; SOFT TISSUE at 02:08

## 2020-08-03 NOTE — PROGRESS NOTES
Subjective:      Patient ID: Herman Chirinos is a 70 y.o. male.    Chief Complaint: Injections and Pain of the Left Knee    HPI  Patient returns regarding his left knee pain. He was seen on 7-6 and x-rays showed DJD. He took naproxen for 2 weeks and says it helped but pain returned once he stopped taking it. He reports mild swelling. He denies fever, chills, and sweats.   Review of Systems   Constitution: Negative for chills, fever and night sweats.   Cardiovascular: Negative for chest pain.   Respiratory: Negative for cough and shortness of breath.    Hematologic/Lymphatic: Does not bruise/bleed easily.   Skin: Negative for color change.   Gastrointestinal: Negative for heartburn.   Genitourinary: Negative for dysuria.   Neurological: Negative for numbness and paresthesias.   Psychiatric/Behavioral: Negative for altered mental status.   Allergic/Immunologic: Negative for persistent infections.         Objective:            General    Vitals reviewed.  Constitutional: He is oriented to person, place, and time. He appears well-developed and well-nourished.   Cardiovascular: Normal rate.    Neurological: He is alert and oriented to person, place, and time.             Left Knee Exam:  ROM 5-110 degrees  Small effusion  No warmth or erythema  TTP medial joint line        X-ray: reviewed by myself. DJD present.          Assessment:       Encounter Diagnosis   Name Primary?    Primary osteoarthritis of left knee Yes          Plan:       Discussed treatment options with patient. He would like to try an injection. Ice and elevate. RTC prn.     PROCEDURE:  I have explained the risks, benefits, and alternatives of the procedure in detail.  The patient voices understanding and all questions have been answered.  The patient agrees to proceed as planned. So after I performed a sterile prep of the skin in the normal fashion the left knee is injected using a 22 gauge needle from the anterolateral approach with a combination of  4cc 1% plain lidocaine and 80 mg of depo medrol.  The patient is cautioned and immediate relief of pain is secondary to the local anesthetic and will be temporary.  After the anesthetic wears off there may be a increase in pain that may last for a few hours or a few days and they should use ice to help alleviate this flair up of pain.

## 2020-10-06 ENCOUNTER — TELEPHONE (OUTPATIENT)
Dept: NEUROLOGY | Facility: CLINIC | Age: 70
End: 2020-10-06

## 2020-10-06 NOTE — TELEPHONE ENCOUNTER
----- Message from Cris Chand sent at 10/6/2020 10:58 AM CDT -----  Regarding: pt advice  Contact: pt@ [phone]  Pt calling to speak with someone in Dr. Coley's office regarding a letter he received. Please call.

## 2020-10-09 ENCOUNTER — TELEPHONE (OUTPATIENT)
Dept: INTERNAL MEDICINE | Facility: CLINIC | Age: 70
End: 2020-10-09

## 2020-10-09 ENCOUNTER — IMMUNIZATION (OUTPATIENT)
Dept: PHARMACY | Facility: CLINIC | Age: 70
End: 2020-10-09
Payer: MEDICARE

## 2020-10-09 ENCOUNTER — LAB VISIT (OUTPATIENT)
Dept: LAB | Facility: HOSPITAL | Age: 70
End: 2020-10-09
Attending: FAMILY MEDICINE
Payer: MEDICARE

## 2020-10-09 ENCOUNTER — IMMUNIZATION (OUTPATIENT)
Dept: PHARMACY | Facility: CLINIC | Age: 70
End: 2020-10-09

## 2020-10-09 ENCOUNTER — OFFICE VISIT (OUTPATIENT)
Dept: INTERNAL MEDICINE | Facility: CLINIC | Age: 70
End: 2020-10-09
Payer: MEDICARE

## 2020-10-09 VITALS
HEART RATE: 71 BPM | BODY MASS INDEX: 27.66 KG/M2 | SYSTOLIC BLOOD PRESSURE: 120 MMHG | WEIGHT: 203.94 LBS | OXYGEN SATURATION: 99 % | DIASTOLIC BLOOD PRESSURE: 70 MMHG

## 2020-10-09 DIAGNOSIS — Z00.00 ENCOUNTER FOR PREVENTIVE HEALTH EXAMINATION: Primary | ICD-10-CM

## 2020-10-09 DIAGNOSIS — M48.061 SPINAL STENOSIS OF LUMBAR REGION WITHOUT NEUROGENIC CLAUDICATION: ICD-10-CM

## 2020-10-09 DIAGNOSIS — Z12.5 PROSTATE CANCER SCREENING: ICD-10-CM

## 2020-10-09 DIAGNOSIS — N40.0 BENIGN NODULAR PROSTATIC HYPERPLASIA WITHOUT LOWER URINARY TRACT SYMPTOMS: ICD-10-CM

## 2020-10-09 DIAGNOSIS — E78.2 MIXED HYPERLIPIDEMIA: ICD-10-CM

## 2020-10-09 DIAGNOSIS — R73.03 PREDIABETES: ICD-10-CM

## 2020-10-09 DIAGNOSIS — E55.9 VITAMIN D DEFICIENCY: ICD-10-CM

## 2020-10-09 DIAGNOSIS — N52.9 ERECTILE DYSFUNCTION, UNSPECIFIED ERECTILE DYSFUNCTION TYPE: ICD-10-CM

## 2020-10-09 DIAGNOSIS — J31.0 CHRONIC RHINITIS: ICD-10-CM

## 2020-10-09 DIAGNOSIS — H40.1131 PRIMARY OPEN ANGLE GLAUCOMA (POAG) OF BOTH EYES, MILD STAGE: ICD-10-CM

## 2020-10-09 DIAGNOSIS — R73.03 PREDIABETES: Primary | ICD-10-CM

## 2020-10-09 DIAGNOSIS — M17.0 PRIMARY OSTEOARTHRITIS OF BOTH KNEES: ICD-10-CM

## 2020-10-09 DIAGNOSIS — R25.1 TREMOR: ICD-10-CM

## 2020-10-09 LAB
ESTIMATED AVG GLUCOSE: 120 MG/DL (ref 68–131)
HBA1C MFR BLD HPLC: 5.8 % (ref 4–5.6)

## 2020-10-09 PROCEDURE — 99999 PR PBB SHADOW E&M-EST. PATIENT-LVL IV: CPT | Mod: PBBFAC,,, | Performed by: PHYSICIAN ASSISTANT

## 2020-10-09 PROCEDURE — G0439 PR MEDICARE ANNUAL WELLNESS SUBSEQUENT VISIT: ICD-10-PCS | Mod: S$GLB,,, | Performed by: PHYSICIAN ASSISTANT

## 2020-10-09 PROCEDURE — 99214 OFFICE O/P EST MOD 30 MIN: CPT | Mod: PBBFAC | Performed by: PHYSICIAN ASSISTANT

## 2020-10-09 PROCEDURE — 36415 COLL VENOUS BLD VENIPUNCTURE: CPT

## 2020-10-09 PROCEDURE — 99999 PR PBB SHADOW E&M-EST. PATIENT-LVL IV: ICD-10-PCS | Mod: PBBFAC,,, | Performed by: PHYSICIAN ASSISTANT

## 2020-10-09 PROCEDURE — 83036 HEMOGLOBIN GLYCOSYLATED A1C: CPT

## 2020-10-09 PROCEDURE — G0439 PPPS, SUBSEQ VISIT: HCPCS | Mod: S$GLB,,, | Performed by: PHYSICIAN ASSISTANT

## 2020-10-09 NOTE — PROGRESS NOTES
Herman Chirinos presented for a  Medicare AWV and comprehensive Health Risk Assessment today. The following components were reviewed and updated:    · Medical history  · Family History  · Social history  · Allergies and Current Medications  · Health Risk Assessment  · Health Maintenance  · Care Team     ** See Completed Assessments for Annual Wellness Visit within the encounter summary.**         The following assessments were completed:  · Living Situation  · CAGE  · Depression Screening  · Timed Get Up and Go  · Whisper Test  · Cognitive Function Screening    · Nutrition Screening  · ADL Screening  · PAQ Screening        Vitals:    10/09/20 0854   BP: 120/70   Pulse: 71   SpO2: 99%   Weight: 92.5 kg (203 lb 14.8 oz)     Body mass index is 27.66 kg/m².  Physical Exam  Vitals signs reviewed.   Constitutional:       General: He is not in acute distress.     Appearance: Normal appearance. He is well-developed.   HENT:      Head: Normocephalic and atraumatic.      Right Ear: Tympanic membrane, ear canal and external ear normal.      Left Ear: Tympanic membrane, ear canal and external ear normal.      Mouth/Throat:      Mouth: Mucous membranes are moist.      Pharynx: Oropharynx is clear.   Eyes:      Extraocular Movements: Extraocular movements intact.      Pupils: Pupils are equal, round, and reactive to light.   Cardiovascular:      Rate and Rhythm: Normal rate and regular rhythm.      Heart sounds: No murmur. No friction rub.   Pulmonary:      Effort: Pulmonary effort is normal.      Breath sounds: Normal breath sounds. No wheezing or rales.   Abdominal:      General: Bowel sounds are normal.      Palpations: Abdomen is soft.      Tenderness: There is no abdominal tenderness.   Musculoskeletal:      Right lower leg: No edema.      Left lower leg: No edema.   Lymphadenopathy:      Cervical: No cervical adenopathy.   Skin:     General: Skin is warm and dry.      Findings: No rash.   Neurological:      Mental Status:  He is alert.   Psychiatric:         Mood and Affect: Mood normal.               Diagnoses and health risks identified today and associated recommendations/orders:    1. Encounter for preventive health examination  Exam and Assessments performed  Chart Review Complete  Health Maintenance Reviewed and updated (PSA and Lipid ordered, Pt will update Flu and Shingles today)      2. Mixed hyperlipidemia  Stable on statin therapy  - Lipid Panel; Future  Followed by PCP    3. Prostate cancer screening  - PSA, Screening; Future  Followed by PCP    4. Chronic rhinitis  Stable with Flonase and loratadine as needed  Followed by PCP    5. Erectile dysfunction, unspecified erectile dysfunction type  Stable on Viagra as needed  Followed by PCP    6. Benign nodular prostatic hyperplasia without lower urinary tract symptoms  Improved with Flomax  Followed by PCP    7. Prediabetes  Stable  Lifestyle mods discussed  Followed by PCP    8. Vitamin D deficiency  Improved of Vit D supplements  Followed by PCP    9. Primary osteoarthritis of both knees  S/p left knee steroid injection  Followed by Ortho    10. Primary open angle glaucoma (POAG) of both eyes, mild stage  Stable on latanaprost  Followed by Optometry    11. Tremor  Stable  Followed by Neurology    12. Spinal stenosis of lumbar region: see MRI 2006  Stable  Followed by PCP      Provided Herman with a 5-10 year written screening schedule and personal prevention plan. Recommendations were developed using the USPSTF age appropriate recommendations. Education, counseling, and referrals were provided as needed. After Visit Summary printed and given to patient which includes a list of additional screenings\tests needed.    Follow up in about 24 days (around 11/2/2020) for PCP follow up and 1 year for next Medicare AWV.    Rosa Torres PA-C

## 2020-10-19 ENCOUNTER — PATIENT OUTREACH (OUTPATIENT)
Dept: ADMINISTRATIVE | Facility: HOSPITAL | Age: 70
End: 2020-10-19

## 2020-10-19 NOTE — PROGRESS NOTES
Health Maintenance Due   Topic Date Due    PROSTATE-SPECIFIC ANTIGEN  04/26/2020    Lipid Panel  04/26/2020     Pt has lab appointment scheduled on 11/2/2020.  Chart review completed.

## 2020-11-02 ENCOUNTER — OFFICE VISIT (OUTPATIENT)
Dept: INTERNAL MEDICINE | Facility: CLINIC | Age: 70
End: 2020-11-02
Payer: MEDICARE

## 2020-11-02 ENCOUNTER — LAB VISIT (OUTPATIENT)
Dept: LAB | Facility: HOSPITAL | Age: 70
End: 2020-11-02
Attending: PHYSICIAN ASSISTANT
Payer: MEDICARE

## 2020-11-02 VITALS
HEART RATE: 68 BPM | TEMPERATURE: 99 F | WEIGHT: 201.75 LBS | OXYGEN SATURATION: 99 % | BODY MASS INDEX: 27.33 KG/M2 | SYSTOLIC BLOOD PRESSURE: 108 MMHG | HEIGHT: 72 IN | DIASTOLIC BLOOD PRESSURE: 80 MMHG

## 2020-11-02 DIAGNOSIS — M17.0 PRIMARY OSTEOARTHRITIS OF BOTH KNEES: ICD-10-CM

## 2020-11-02 DIAGNOSIS — E78.2 MIXED HYPERLIPIDEMIA: ICD-10-CM

## 2020-11-02 DIAGNOSIS — Z00.00 ANNUAL PHYSICAL EXAM: Primary | ICD-10-CM

## 2020-11-02 DIAGNOSIS — N40.1 BPH WITH URINARY OBSTRUCTION: ICD-10-CM

## 2020-11-02 DIAGNOSIS — N52.9 ERECTILE DYSFUNCTION, UNSPECIFIED ERECTILE DYSFUNCTION TYPE: ICD-10-CM

## 2020-11-02 DIAGNOSIS — E55.9 VITAMIN D DEFICIENCY: ICD-10-CM

## 2020-11-02 DIAGNOSIS — R73.03 PREDIABETES: ICD-10-CM

## 2020-11-02 DIAGNOSIS — N13.8 BPH WITH URINARY OBSTRUCTION: ICD-10-CM

## 2020-11-02 DIAGNOSIS — E66.3 OVERWEIGHT (BMI 25.0-29.9): ICD-10-CM

## 2020-11-02 LAB
CHOLEST SERPL-MCNC: 161 MG/DL (ref 120–199)
CHOLEST/HDLC SERPL: 2.8 {RATIO} (ref 2–5)
HDLC SERPL-MCNC: 57 MG/DL (ref 40–75)
HDLC SERPL: 35.4 % (ref 20–50)
LDLC SERPL CALC-MCNC: 91.4 MG/DL (ref 63–159)
NONHDLC SERPL-MCNC: 104 MG/DL
TRIGL SERPL-MCNC: 63 MG/DL (ref 30–150)

## 2020-11-02 PROCEDURE — 99397 PER PM REEVAL EST PAT 65+ YR: CPT | Mod: S$PBB,,, | Performed by: FAMILY MEDICINE

## 2020-11-02 PROCEDURE — 99213 OFFICE O/P EST LOW 20 MIN: CPT | Mod: PBBFAC | Performed by: FAMILY MEDICINE

## 2020-11-02 PROCEDURE — 99999 PR PBB SHADOW E&M-EST. PATIENT-LVL III: CPT | Mod: PBBFAC,,, | Performed by: FAMILY MEDICINE

## 2020-11-02 PROCEDURE — 99397 PR PREVENTIVE VISIT,EST,65 & OVER: ICD-10-PCS | Mod: S$PBB,,, | Performed by: FAMILY MEDICINE

## 2020-11-02 PROCEDURE — 99999 PR PBB SHADOW E&M-EST. PATIENT-LVL III: ICD-10-PCS | Mod: PBBFAC,,, | Performed by: FAMILY MEDICINE

## 2020-11-02 PROCEDURE — 80061 LIPID PANEL: CPT

## 2020-11-02 RX ORDER — SILDENAFIL 100 MG/1
100 TABLET, FILM COATED ORAL DAILY PRN
Qty: 10 TABLET | Refills: 11 | Status: SHIPPED | OUTPATIENT
Start: 2020-11-02 | End: 2022-02-09 | Stop reason: SDUPTHER

## 2020-11-02 RX ORDER — MELOXICAM 15 MG/1
15 TABLET ORAL DAILY PRN
Qty: 30 TABLET | Refills: 11 | Status: SHIPPED | OUTPATIENT
Start: 2020-11-02 | End: 2021-11-03

## 2020-11-02 RX ORDER — TAMSULOSIN HYDROCHLORIDE 0.4 MG/1
CAPSULE ORAL
Qty: 90 CAPSULE | Refills: 3 | Status: SHIPPED | OUTPATIENT
Start: 2020-11-02 | End: 2021-11-03

## 2020-11-02 NOTE — PROGRESS NOTES
Subjective:      Patient ID: Herman Chirinos is a 70 y.o. male.    Chief Complaint: Annual Exam      HPI:  Herman Chirinos is a 70 year old male with BPH, chronic rhinitis, erectile dysfunction, glaucoma, hyperlipidemia, history of impaired fasting glucose, lumbar stenosis, obesity, osteoarthritis bilateral knees, prediabetes, and vitamin D deficiency who presents to clinic today for annual physical.    BPH:  Prescribed tamsulosin 0.4 mg by mouth daily.  PSA 3.3 4/26/19.    Chronic rhinitis:  Prescribed Claritin 10 mg by mouth daily.      ED:  Prescribed sildenafil 100 mg by mouth daily as needed.    Glaucoma:  Prescribed latanoprost 0.005% eye drops.    HLD:  Prescribed pravastatin 80 mg by mouth daily.  Total cholesterol 215 4/26/19.    OA, knees:  Followed by orthopedics, last seen by them 8/3/20.  Received left knee injection at that time.  Was previously recommended to try naproxen BID, ice and elevate.  States the shot is beginning to wear off.  States he has difficulty bending the left knee without pain.  States he took Aleve which does help a little.  States the pain is worse after walking long distances.  States the Aleve relieves some pain but does not completely control.  Denies associated redness, swelling, recent injury.    Overweight:  BMI 27.36     Prediabetes:  A1c 5.8% 10/9/20.         Vitamin D deficiency:  Noted to 26 4/26/19.  Previously prescribed 2000 units daily.  Taking Centrum MVI currently.    Health Care Maintenance:  Influenza vaccination:  10/9/20  Last tetanus booster:  5/24/18  Prevnar:  1/8/16  Pneumovax:  8/29/14  Last colonoscopy:  4/7/16; One 4 mm polyp in the transverse colon, Two 3 to 5 mm polyps in the transverse colon and at the hepatic flexure; repeat 5 years      Past Medical History:   Diagnosis Date    Appendicitis     Benign neoplasm of colon     Cataract     Chronic rhinitis     Colon polyps     Glaucoma     Hyperlipidemia     IFG (impaired fasting glucose)  1/8/2016    Lumbar stenosis     Osteoarthritis of both knees 5/24/2018    Tubular adenoma of colon: 4/16 repeat 2021 4/7/2017       Past Surgical History:   Procedure Laterality Date    APPENDECTOMY      COLONOSCOPY  2007    repeat recommended in five years    COLONOSCOPY N/A 4/7/2016    Procedure: COLONOSCOPY;  Surgeon: Luis Yusuf MD;  Location: [facility] ([address] [place] [address]);  Service: Endoscopy;  Laterality: N/A;  last procedure with Maciel, patient requesting very early appt       Family History   Problem Relation Age of Onset    Coronary artery disease Mother     Hypertension Mother     Heart disease Mother         heart transplant    Diabetes Mellitus Father     Diabetes Father     Kidney disease Sister     No Known Problems Brother     No Known Problems Daughter     No Known Problems Sister     No Known Problems Brother     No Known Problems Brother     No Known Problems Son     No Known Problems Son     No Known Problems Son     No Known Problems Maternal Aunt     No Known Problems Maternal Uncle     No Known Problems Paternal Aunt     No Known Problems Paternal Uncle     No Known Problems Maternal Grandmother     No Known Problems Maternal Grandfather     No Known Problems Paternal Grandmother     No Known Problems Paternal Grandfather     No Known Problems Daughter     No Known Problems Daughter     Cancer Neg Hx     Stroke Neg Hx     Glaucoma Neg Hx     Cataracts Neg Hx     Blindness Neg Hx     Amblyopia Neg Hx     Macular degeneration Neg Hx     Retinal detachment Neg Hx     Strabismus Neg Hx     Thyroid disease Neg Hx     Psoriasis Neg Hx     Tremor Neg Hx     Parkinsonism Neg Hx        Social History     Socioeconomic History    Marital status:      Spouse name: Not on file    Number of children: 6    Years of education: Not on file    Highest education level: Not on file   Occupational History    Occupation: [employer]     Employer: [employer]      Comment: shell   Social Needs    Financial resource strain: Not on file    Food insecurity     Worry: Not on file     Inability: Not on file    Transportation needs     Medical: Not on file     Non-medical: Not on file   Tobacco Use    Smoking status: Never Smoker    Smokeless tobacco: Never Used   Substance and Sexual Activity    Alcohol use: No    Drug use: No    Sexual activity: Yes     Partners: Female   Lifestyle    Physical activity     Days per week: Not on file     Minutes per session: Not on file    Stress: Not on file   Relationships    Social connections     Talks on phone: Not on file     Gets together: Not on file     Attends Synagogue service: Not on file     Active member of club or organization: Not on file     Attends meetings of clubs or organizations: Not on file     Relationship status: Not on file   Other Topics Concern    Not on file   Social History Narrative    Not on file       Review of Systems   Constitutional: Negative for chills, fatigue and fever.   HENT: Negative for congestion, hearing loss, nosebleeds, rhinorrhea, sore throat and trouble swallowing.    Eyes: Negative for pain and visual disturbance.   Respiratory: Negative for shortness of breath and wheezing.    Cardiovascular: Negative for chest pain and palpitations.   Gastrointestinal: Negative for abdominal distention, abdominal pain, constipation, diarrhea, nausea and vomiting.   Genitourinary: Negative for difficulty urinating, dysuria, frequency, hematuria and urgency.   Musculoskeletal: Positive for arthralgias. Negative for back pain and myalgias.   Skin: Negative for color change and rash.   Neurological: Negative for dizziness, syncope, speech difficulty, weakness, numbness and headaches.   Psychiatric/Behavioral: Negative for agitation, behavioral problems and confusion. The patient is not nervous/anxious.      Objective:     Vitals:    11/02/20 0948   BP: 108/80   BP Location: Right arm   Patient  Position: Sitting   BP Method: Medium (Manual)   Pulse: 68   Temp: 98.8 °F (37.1 °C)   TempSrc: Oral   SpO2: 99%   Weight: 91.5 kg (201 lb 11.5 oz)   Height: 6' (1.829 m)       Physical Exam  Vitals signs and nursing note reviewed.   Constitutional:       General: He is not in acute distress.     Appearance: He is well-developed.   HENT:      Head: Normocephalic and atraumatic.      Right Ear: Hearing, tympanic membrane, ear canal and external ear normal.      Left Ear: Hearing, tympanic membrane, ear canal and external ear normal.      Nose: Nose normal. No rhinorrhea.   Eyes:      General: Lids are normal.         Right eye: No discharge.         Left eye: No discharge.      Conjunctiva/sclera: Conjunctivae normal.   Neck:      Musculoskeletal: Neck supple.      Trachea: Trachea normal. No tracheal deviation.   Cardiovascular:      Rate and Rhythm: Normal rate and regular rhythm.      Heart sounds: Normal heart sounds. No murmur. No friction rub. No gallop.    Pulmonary:      Effort: Pulmonary effort is normal. No respiratory distress.      Breath sounds: Normal breath sounds. No wheezing or rales.   Abdominal:      General: Bowel sounds are normal. There is no distension.      Palpations: Abdomen is soft.      Tenderness: There is no abdominal tenderness. There is no guarding or rebound.   Musculoskeletal:      Left knee: He exhibits normal alignment, no LCL laxity, normal patellar mobility, normal meniscus and no MCL laxity. Tenderness found. Medial joint line and lateral joint line tenderness noted. No patellar tendon tenderness noted.   Skin:     General: Skin is warm and dry.      Findings: No rash.   Neurological:      Mental Status: He is alert.      Motor: No abnormal muscle tone.   Psychiatric:         Speech: Speech normal.         Behavior: Behavior normal. Behavior is cooperative.         Thought Content: Thought content normal.         Judgment: Judgment normal.        Assessment:      1. Annual  physical exam    2. BPH with urinary obstruction    3. Erectile dysfunction, unspecified erectile dysfunction type    4. Mixed hyperlipidemia    5. Primary osteoarthritis of both knees    6. Overweight (BMI 25.0-29.9)    7. Prediabetes    8. Vitamin D deficiency      Plan:   Herman was seen today for annual exam.    Diagnoses and all orders for this visit:    Annual physical exam  -     CBC Auto Differential; Future  -     Comprehensive Metabolic Panel; Future  -     Vitamin D; Future  -     Lipid panel previously ordered    BPH with urinary obstruction  -     Stable, continue tamsulosin (FLOMAX) 0.4 mg Cap; TAKE 1 CAPSULE(0.4 MG) BY MOUTH EVERY EVENING, refilled.    Erectile dysfunction, unspecified erectile dysfunction type  -     Stable, continue sildenafiL (VIAGRA) 100 MG tablet; Take 1 tablet (100 mg total) by mouth daily as needed for Erectile Dysfunction, refilled.    Mixed hyperlipidemia        -     Continue statin; complete lipid panel as previously ordered.    Primary osteoarthritis of both knees  -     Discontinue Aleve and avoid other NSAIDs while on meloxicam, ok to use Tylenol PRN breakthrough.  Start meloxicam (MOBIC) 15 MG tablet; Take 1 tablet (15 mg total) by mouth daily as needed for Pain.  Recommended ice, elevation, OTC topical such as Bengay; f/u with orthopedics if no improvement.    Overweight (BMI 25.0-29.9)        -     Recommend daily aerobic exercise.    Prediabetes  -     CBC Auto Differential; Future  -     Complete f/u A1c as previously ordered    Vitamin D deficiency  -     Vitamin D; Future; continue MVI, check level and reassess for need for additional supplement

## 2020-11-03 ENCOUNTER — TELEPHONE (OUTPATIENT)
Dept: INTERNAL MEDICINE | Facility: CLINIC | Age: 70
End: 2020-11-03

## 2020-11-04 ENCOUNTER — TELEPHONE (OUTPATIENT)
Dept: INTERNAL MEDICINE | Facility: CLINIC | Age: 70
End: 2020-11-04

## 2020-11-04 DIAGNOSIS — E55.9 VITAMIN D DEFICIENCY: Primary | ICD-10-CM

## 2021-01-11 ENCOUNTER — IMMUNIZATION (OUTPATIENT)
Dept: INTERNAL MEDICINE | Facility: CLINIC | Age: 71
End: 2021-01-11
Payer: MEDICARE

## 2021-01-11 DIAGNOSIS — Z23 NEED FOR VACCINATION: ICD-10-CM

## 2021-01-11 PROCEDURE — 91300 COVID-19, MRNA, LNP-S, PF, 30 MCG/0.3 ML DOSE VACCINE: CPT | Mod: PBBFAC | Performed by: FAMILY MEDICINE

## 2021-01-19 ENCOUNTER — TELEPHONE (OUTPATIENT)
Dept: NEUROLOGY | Facility: CLINIC | Age: 71
End: 2021-01-19

## 2021-01-28 ENCOUNTER — TELEPHONE (OUTPATIENT)
Dept: NEUROLOGY | Facility: CLINIC | Age: 71
End: 2021-01-28

## 2021-01-28 ENCOUNTER — OFFICE VISIT (OUTPATIENT)
Dept: ORTHOPEDICS | Facility: CLINIC | Age: 71
End: 2021-01-28
Payer: MEDICARE

## 2021-01-28 VITALS — WEIGHT: 201.75 LBS | BODY MASS INDEX: 27.33 KG/M2 | HEIGHT: 72 IN

## 2021-01-28 DIAGNOSIS — M17.12 PRIMARY OSTEOARTHRITIS OF LEFT KNEE: Primary | ICD-10-CM

## 2021-01-28 PROCEDURE — 99999 PR PBB SHADOW E&M-EST. PATIENT-LVL III: ICD-10-PCS | Mod: PBBFAC,,, | Performed by: PHYSICIAN ASSISTANT

## 2021-01-28 PROCEDURE — 20610 DRAIN/INJ JOINT/BURSA W/O US: CPT | Mod: PBBFAC | Performed by: PHYSICIAN ASSISTANT

## 2021-01-28 PROCEDURE — 99213 OFFICE O/P EST LOW 20 MIN: CPT | Mod: PBBFAC,25 | Performed by: PHYSICIAN ASSISTANT

## 2021-01-28 PROCEDURE — 20610 PR DRAIN/INJECT LARGE JOINT/BURSA: ICD-10-PCS | Mod: S$PBB,LT,, | Performed by: PHYSICIAN ASSISTANT

## 2021-01-28 PROCEDURE — 99999 PR PBB SHADOW E&M-EST. PATIENT-LVL III: CPT | Mod: PBBFAC,,, | Performed by: PHYSICIAN ASSISTANT

## 2021-01-28 PROCEDURE — 20610 DRAIN/INJ JOINT/BURSA W/O US: CPT | Mod: S$PBB,LT,, | Performed by: PHYSICIAN ASSISTANT

## 2021-01-28 PROCEDURE — 99213 OFFICE O/P EST LOW 20 MIN: CPT | Mod: 25,S$PBB,, | Performed by: PHYSICIAN ASSISTANT

## 2021-01-28 PROCEDURE — 99213 PR OFFICE/OUTPT VISIT, EST, LEVL III, 20-29 MIN: ICD-10-PCS | Mod: 25,S$PBB,, | Performed by: PHYSICIAN ASSISTANT

## 2021-01-28 RX ORDER — NAPROXEN 500 MG/1
500 TABLET ORAL 2 TIMES DAILY WITH MEALS
Qty: 60 TABLET | Refills: 0 | Status: SHIPPED | OUTPATIENT
Start: 2021-01-28 | End: 2021-02-27

## 2021-01-28 RX ORDER — METHYLPREDNISOLONE ACETATE 80 MG/ML
80 INJECTION, SUSPENSION INTRA-ARTICULAR; INTRALESIONAL; INTRAMUSCULAR; SOFT TISSUE
Status: COMPLETED | OUTPATIENT
Start: 2021-01-28 | End: 2021-01-28

## 2021-01-28 RX ADMIN — METHYLPREDNISOLONE ACETATE 80 MG: 80 INJECTION, SUSPENSION INTRALESIONAL; INTRAMUSCULAR; INTRASYNOVIAL; SOFT TISSUE at 02:01

## 2021-02-02 ENCOUNTER — IMMUNIZATION (OUTPATIENT)
Dept: INTERNAL MEDICINE | Facility: CLINIC | Age: 71
End: 2021-02-02
Payer: MEDICARE

## 2021-02-02 DIAGNOSIS — Z23 NEED FOR VACCINATION: Primary | ICD-10-CM

## 2021-02-02 PROCEDURE — 91300 COVID-19, MRNA, LNP-S, PF, 30 MCG/0.3 ML DOSE VACCINE: CPT | Mod: S$GLB,,, | Performed by: FAMILY MEDICINE

## 2021-02-02 PROCEDURE — 0002A COVID-19, MRNA, LNP-S, PF, 30 MCG/0.3 ML DOSE VACCINE: CPT | Mod: CV19,S$GLB,, | Performed by: FAMILY MEDICINE

## 2021-02-02 PROCEDURE — 91300 COVID-19, MRNA, LNP-S, PF, 30 MCG/0.3 ML DOSE VACCINE: ICD-10-PCS | Mod: S$GLB,,, | Performed by: FAMILY MEDICINE

## 2021-02-02 PROCEDURE — 0002A COVID-19, MRNA, LNP-S, PF, 30 MCG/0.3 ML DOSE VACCINE: ICD-10-PCS | Mod: CV19,S$GLB,, | Performed by: FAMILY MEDICINE

## 2021-02-25 DIAGNOSIS — H40.1131 PRIMARY OPEN ANGLE GLAUCOMA OF BOTH EYES, MILD STAGE: ICD-10-CM

## 2021-02-25 RX ORDER — LATANOPROST 50 UG/ML
1 SOLUTION/ DROPS OPHTHALMIC NIGHTLY
Qty: 2.5 ML | Refills: 12 | Status: SHIPPED | OUTPATIENT
Start: 2021-02-25 | End: 2022-03-23

## 2021-03-18 ENCOUNTER — PATIENT MESSAGE (OUTPATIENT)
Dept: RESEARCH | Facility: HOSPITAL | Age: 71
End: 2021-03-18

## 2021-03-26 ENCOUNTER — PATIENT MESSAGE (OUTPATIENT)
Dept: RESEARCH | Facility: HOSPITAL | Age: 71
End: 2021-03-26

## 2021-04-09 ENCOUNTER — LAB VISIT (OUTPATIENT)
Dept: LAB | Facility: HOSPITAL | Age: 71
End: 2021-04-09
Attending: FAMILY MEDICINE
Payer: MEDICARE

## 2021-04-09 DIAGNOSIS — R73.03 PREDIABETES: ICD-10-CM

## 2021-04-09 LAB
ESTIMATED AVG GLUCOSE: 123 MG/DL (ref 68–131)
HBA1C MFR BLD: 5.9 % (ref 4–5.6)

## 2021-04-09 PROCEDURE — 36415 COLL VENOUS BLD VENIPUNCTURE: CPT | Performed by: FAMILY MEDICINE

## 2021-04-09 PROCEDURE — 83036 HEMOGLOBIN GLYCOSYLATED A1C: CPT | Performed by: FAMILY MEDICINE

## 2021-04-11 ENCOUNTER — TELEPHONE (OUTPATIENT)
Dept: INTERNAL MEDICINE | Facility: CLINIC | Age: 71
End: 2021-04-11

## 2021-04-11 DIAGNOSIS — R73.03 PREDIABETES: Primary | ICD-10-CM

## 2021-05-03 ENCOUNTER — TELEPHONE (OUTPATIENT)
Dept: NEUROLOGY | Facility: CLINIC | Age: 71
End: 2021-05-03

## 2021-05-04 ENCOUNTER — OFFICE VISIT (OUTPATIENT)
Dept: NEUROLOGY | Facility: CLINIC | Age: 71
End: 2021-05-04
Payer: MEDICARE

## 2021-05-04 ENCOUNTER — LAB VISIT (OUTPATIENT)
Dept: LAB | Facility: HOSPITAL | Age: 71
End: 2021-05-04
Payer: MEDICARE

## 2021-05-04 DIAGNOSIS — E55.9 VITAMIN D DEFICIENCY: ICD-10-CM

## 2021-05-04 DIAGNOSIS — M25.562 CHRONIC PAIN OF LEFT KNEE: ICD-10-CM

## 2021-05-04 DIAGNOSIS — G89.29 CHRONIC PAIN OF LEFT KNEE: ICD-10-CM

## 2021-05-04 DIAGNOSIS — R25.9 ABNORMAL MOVEMENT: Primary | ICD-10-CM

## 2021-05-04 DIAGNOSIS — R25.1 TREMOR: ICD-10-CM

## 2021-05-04 DIAGNOSIS — R25.9 ABNORMAL MOVEMENT: ICD-10-CM

## 2021-05-04 LAB
25(OH)D3+25(OH)D2 SERPL-MCNC: 27 NG/ML (ref 30–96)
FOLATE SERPL-MCNC: 16.9 NG/ML (ref 4–24)
T4 FREE SERPL-MCNC: 0.95 NG/DL (ref 0.71–1.51)
TSH SERPL DL<=0.005 MIU/L-ACNC: 0.68 UIU/ML (ref 0.4–4)
VIT B12 SERPL-MCNC: 762 PG/ML (ref 210–950)

## 2021-05-04 PROCEDURE — 99999 PR PBB SHADOW E&M-EST. PATIENT-LVL III: CPT | Mod: PBBFAC,,, | Performed by: NURSE PRACTITIONER

## 2021-05-04 PROCEDURE — 99215 PR OFFICE/OUTPT VISIT, EST, LEVL V, 40-54 MIN: ICD-10-PCS | Mod: S$PBB,,, | Performed by: NURSE PRACTITIONER

## 2021-05-04 PROCEDURE — 82607 VITAMIN B-12: CPT | Performed by: NURSE PRACTITIONER

## 2021-05-04 PROCEDURE — 82306 VITAMIN D 25 HYDROXY: CPT | Performed by: FAMILY MEDICINE

## 2021-05-04 PROCEDURE — 99213 OFFICE O/P EST LOW 20 MIN: CPT | Mod: PBBFAC | Performed by: NURSE PRACTITIONER

## 2021-05-04 PROCEDURE — 82746 ASSAY OF FOLIC ACID SERUM: CPT | Performed by: NURSE PRACTITIONER

## 2021-05-04 PROCEDURE — 84425 ASSAY OF VITAMIN B-1: CPT | Performed by: NURSE PRACTITIONER

## 2021-05-04 PROCEDURE — 84439 ASSAY OF FREE THYROXINE: CPT | Performed by: NURSE PRACTITIONER

## 2021-05-04 PROCEDURE — 36415 COLL VENOUS BLD VENIPUNCTURE: CPT | Performed by: NURSE PRACTITIONER

## 2021-05-04 PROCEDURE — 99215 OFFICE O/P EST HI 40 MIN: CPT | Mod: S$PBB,,, | Performed by: NURSE PRACTITIONER

## 2021-05-04 PROCEDURE — 99999 PR PBB SHADOW E&M-EST. PATIENT-LVL III: ICD-10-PCS | Mod: PBBFAC,,, | Performed by: NURSE PRACTITIONER

## 2021-05-04 PROCEDURE — 84443 ASSAY THYROID STIM HORMONE: CPT | Performed by: NURSE PRACTITIONER

## 2021-05-04 PROCEDURE — 84207 ASSAY OF VITAMIN B-6: CPT | Performed by: NURSE PRACTITIONER

## 2021-05-05 ENCOUNTER — PATIENT OUTREACH (OUTPATIENT)
Dept: ADMINISTRATIVE | Facility: OTHER | Age: 71
End: 2021-05-05

## 2021-05-05 ENCOUNTER — PATIENT MESSAGE (OUTPATIENT)
Dept: INTERNAL MEDICINE | Facility: CLINIC | Age: 71
End: 2021-05-05

## 2021-05-05 ENCOUNTER — TELEPHONE (OUTPATIENT)
Dept: INTERNAL MEDICINE | Facility: CLINIC | Age: 71
End: 2021-05-05

## 2021-05-05 ENCOUNTER — HOSPITAL ENCOUNTER (OUTPATIENT)
Dept: RADIOLOGY | Facility: HOSPITAL | Age: 71
Discharge: HOME | End: 2021-05-05
Attending: FAMILY MEDICINE
Payer: MEDICARE

## 2021-05-05 ENCOUNTER — OFFICE VISIT (OUTPATIENT)
Dept: INTERNAL MEDICINE | Facility: CLINIC | Age: 71
End: 2021-05-05
Payer: MEDICARE

## 2021-05-05 VITALS
DIASTOLIC BLOOD PRESSURE: 91 MMHG | OXYGEN SATURATION: 99 % | WEIGHT: 205.94 LBS | TEMPERATURE: 98 F | BODY MASS INDEX: 27.89 KG/M2 | SYSTOLIC BLOOD PRESSURE: 115 MMHG | HEART RATE: 88 BPM | HEIGHT: 72 IN

## 2021-05-05 DIAGNOSIS — Z12.11 SCREENING FOR COLON CANCER: ICD-10-CM

## 2021-05-05 DIAGNOSIS — E55.9 VITAMIN D DEFICIENCY: Primary | ICD-10-CM

## 2021-05-05 DIAGNOSIS — S09.90XA HEAD TRAUMA, INITIAL ENCOUNTER: ICD-10-CM

## 2021-05-05 DIAGNOSIS — K92.0 HEMATEMESIS WITH NAUSEA: ICD-10-CM

## 2021-05-05 DIAGNOSIS — M25.512 ACUTE PAIN OF LEFT SHOULDER: ICD-10-CM

## 2021-05-05 DIAGNOSIS — M25.562 ACUTE PAIN OF LEFT KNEE: ICD-10-CM

## 2021-05-05 DIAGNOSIS — Z87.898 HISTORY OF SYNCOPE: Primary | ICD-10-CM

## 2021-05-05 DIAGNOSIS — Z91.81 HISTORY OF FALL: ICD-10-CM

## 2021-05-05 PROCEDURE — 99215 OFFICE O/P EST HI 40 MIN: CPT | Mod: PBBFAC,25 | Performed by: FAMILY MEDICINE

## 2021-05-05 PROCEDURE — 73560 X-RAY EXAM OF KNEE 1 OR 2: CPT | Mod: TC,LT

## 2021-05-05 PROCEDURE — 73560 X-RAY EXAM OF KNEE 1 OR 2: CPT | Mod: 26,LT,, | Performed by: RADIOLOGY

## 2021-05-05 PROCEDURE — 73560 XR KNEE 1 OR 2 VIEW LEFT: ICD-10-PCS | Mod: 26,LT,, | Performed by: RADIOLOGY

## 2021-05-05 PROCEDURE — 99999 PR PBB SHADOW E&M-EST. PATIENT-LVL V: ICD-10-PCS | Mod: PBBFAC,,, | Performed by: FAMILY MEDICINE

## 2021-05-05 PROCEDURE — 99999 PR PBB SHADOW E&M-EST. PATIENT-LVL V: CPT | Mod: PBBFAC,,, | Performed by: FAMILY MEDICINE

## 2021-05-05 PROCEDURE — 99214 OFFICE O/P EST MOD 30 MIN: CPT | Mod: S$PBB,,, | Performed by: FAMILY MEDICINE

## 2021-05-05 PROCEDURE — 99214 PR OFFICE/OUTPT VISIT, EST, LEVL IV, 30-39 MIN: ICD-10-PCS | Mod: S$PBB,,, | Performed by: FAMILY MEDICINE

## 2021-05-05 RX ORDER — VIT C/E/ZN/COPPR/LUTEIN/ZEAXAN 250MG-90MG
1000 CAPSULE ORAL DAILY
Qty: 30 CAPSULE | Refills: 1 | Status: SHIPPED | OUTPATIENT
Start: 2021-05-05 | End: 2021-11-01 | Stop reason: SDUPTHER

## 2021-05-06 ENCOUNTER — PATIENT MESSAGE (OUTPATIENT)
Dept: INTERNAL MEDICINE | Facility: CLINIC | Age: 71
End: 2021-05-06

## 2021-05-06 ENCOUNTER — PATIENT MESSAGE (OUTPATIENT)
Dept: NEUROLOGY | Facility: CLINIC | Age: 71
End: 2021-05-06

## 2021-05-07 ENCOUNTER — OFFICE VISIT (OUTPATIENT)
Dept: NEUROLOGY | Facility: CLINIC | Age: 71
End: 2021-05-07
Payer: MEDICARE

## 2021-05-07 ENCOUNTER — HOSPITAL ENCOUNTER (OUTPATIENT)
Dept: RADIOLOGY | Facility: HOSPITAL | Age: 71
Discharge: HOME | End: 2021-05-07
Attending: FAMILY MEDICINE
Payer: MEDICARE

## 2021-05-07 VITALS
WEIGHT: 205.56 LBS | BODY MASS INDEX: 27.84 KG/M2 | SYSTOLIC BLOOD PRESSURE: 114 MMHG | HEART RATE: 62 BPM | DIASTOLIC BLOOD PRESSURE: 71 MMHG | HEIGHT: 72 IN

## 2021-05-07 DIAGNOSIS — S09.90XA HEAD TRAUMA, INITIAL ENCOUNTER: ICD-10-CM

## 2021-05-07 DIAGNOSIS — R55 MICTURITION SYNCOPE: ICD-10-CM

## 2021-05-07 DIAGNOSIS — Z91.81 HISTORY OF FALL: ICD-10-CM

## 2021-05-07 PROCEDURE — 99213 OFFICE O/P EST LOW 20 MIN: CPT | Mod: PBBFAC,25 | Performed by: STUDENT IN AN ORGANIZED HEALTH CARE EDUCATION/TRAINING PROGRAM

## 2021-05-07 PROCEDURE — 70450 CT HEAD/BRAIN W/O DYE: CPT | Mod: TC

## 2021-05-07 PROCEDURE — 70450 CT HEAD/BRAIN W/O DYE: CPT | Mod: 26,,, | Performed by: RADIOLOGY

## 2021-05-07 PROCEDURE — 70450 CT HEAD WITHOUT CONTRAST: ICD-10-PCS | Mod: 26,,, | Performed by: RADIOLOGY

## 2021-05-07 PROCEDURE — 99999 PR PBB SHADOW E&M-EST. PATIENT-LVL III: CPT | Mod: PBBFAC,GC,, | Performed by: STUDENT IN AN ORGANIZED HEALTH CARE EDUCATION/TRAINING PROGRAM

## 2021-05-07 PROCEDURE — 99999 PR PBB SHADOW E&M-EST. PATIENT-LVL III: ICD-10-PCS | Mod: PBBFAC,GC,, | Performed by: STUDENT IN AN ORGANIZED HEALTH CARE EDUCATION/TRAINING PROGRAM

## 2021-05-07 PROCEDURE — 99213 PR OFFICE/OUTPT VISIT, EST, LEVL III, 20-29 MIN: ICD-10-PCS | Mod: S$PBB,GC,, | Performed by: STUDENT IN AN ORGANIZED HEALTH CARE EDUCATION/TRAINING PROGRAM

## 2021-05-07 PROCEDURE — 99213 OFFICE O/P EST LOW 20 MIN: CPT | Mod: S$PBB,GC,, | Performed by: STUDENT IN AN ORGANIZED HEALTH CARE EDUCATION/TRAINING PROGRAM

## 2021-05-09 ENCOUNTER — PATIENT MESSAGE (OUTPATIENT)
Dept: INTERNAL MEDICINE | Facility: CLINIC | Age: 71
End: 2021-05-09

## 2021-05-10 LAB
PYRIDOXAL SERPL-MCNC: 35 UG/L (ref 5–50)
VIT B1 BLD-MCNC: 50 UG/L (ref 38–122)

## 2021-06-09 ENCOUNTER — TELEPHONE (OUTPATIENT)
Dept: ENDOSCOPY | Facility: HOSPITAL | Age: 71
End: 2021-06-09

## 2021-06-09 ENCOUNTER — OFFICE VISIT (OUTPATIENT)
Dept: GASTROENTEROLOGY | Facility: CLINIC | Age: 71
End: 2021-06-09
Payer: MEDICARE

## 2021-06-09 VITALS
HEART RATE: 99 BPM | WEIGHT: 208.56 LBS | HEIGHT: 72 IN | DIASTOLIC BLOOD PRESSURE: 71 MMHG | SYSTOLIC BLOOD PRESSURE: 113 MMHG | BODY MASS INDEX: 28.25 KG/M2

## 2021-06-09 DIAGNOSIS — K92.0 HEMATEMESIS WITH NAUSEA: ICD-10-CM

## 2021-06-09 DIAGNOSIS — Z12.11 SPECIAL SCREENING FOR MALIGNANT NEOPLASMS, COLON: Primary | ICD-10-CM

## 2021-06-09 PROCEDURE — 99214 OFFICE O/P EST MOD 30 MIN: CPT | Mod: PBBFAC | Performed by: NURSE PRACTITIONER

## 2021-06-09 PROCEDURE — 99213 OFFICE O/P EST LOW 20 MIN: CPT | Mod: S$PBB,,, | Performed by: NURSE PRACTITIONER

## 2021-06-09 PROCEDURE — 99213 PR OFFICE/OUTPT VISIT, EST, LEVL III, 20-29 MIN: ICD-10-PCS | Mod: S$PBB,,, | Performed by: NURSE PRACTITIONER

## 2021-06-09 PROCEDURE — 99999 PR PBB SHADOW E&M-EST. PATIENT-LVL IV: CPT | Mod: PBBFAC,,, | Performed by: NURSE PRACTITIONER

## 2021-06-09 PROCEDURE — 99999 PR PBB SHADOW E&M-EST. PATIENT-LVL IV: ICD-10-PCS | Mod: PBBFAC,,, | Performed by: NURSE PRACTITIONER

## 2021-06-09 RX ORDER — POLYETHYLENE GLYCOL 3350, SODIUM SULFATE ANHYDROUS, SODIUM BICARBONATE, SODIUM CHLORIDE, POTASSIUM CHLORIDE 236; 22.74; 6.74; 5.86; 2.97 G/4L; G/4L; G/4L; G/4L; G/4L
4 POWDER, FOR SOLUTION ORAL ONCE
Qty: 4000 ML | Refills: 0 | Status: SHIPPED | OUTPATIENT
Start: 2021-06-09 | End: 2021-06-09

## 2021-06-18 ENCOUNTER — TELEPHONE (OUTPATIENT)
Dept: ENDOSCOPY | Facility: HOSPITAL | Age: 71
End: 2021-06-18

## 2021-07-07 ENCOUNTER — TELEPHONE (OUTPATIENT)
Dept: ENDOSCOPY | Facility: HOSPITAL | Age: 71
End: 2021-07-07

## 2021-07-08 ENCOUNTER — ANESTHESIA EVENT (OUTPATIENT)
Dept: ENDOSCOPY | Facility: HOSPITAL | Age: 71
End: 2021-07-08
Payer: MEDICARE

## 2021-07-08 ENCOUNTER — ANESTHESIA (OUTPATIENT)
Dept: ENDOSCOPY | Facility: HOSPITAL | Age: 71
End: 2021-07-08
Payer: MEDICARE

## 2021-07-08 ENCOUNTER — HOSPITAL ENCOUNTER (OUTPATIENT)
Facility: HOSPITAL | Age: 71
Discharge: HOME | End: 2021-07-08
Attending: STUDENT IN AN ORGANIZED HEALTH CARE EDUCATION/TRAINING PROGRAM | Admitting: STUDENT IN AN ORGANIZED HEALTH CARE EDUCATION/TRAINING PROGRAM
Payer: MEDICARE

## 2021-07-08 VITALS
WEIGHT: 225 LBS | TEMPERATURE: 98 F | HEART RATE: 63 BPM | SYSTOLIC BLOOD PRESSURE: 120 MMHG | BODY MASS INDEX: 30.48 KG/M2 | HEIGHT: 72 IN | OXYGEN SATURATION: 99 % | RESPIRATION RATE: 20 BRPM | DIASTOLIC BLOOD PRESSURE: 76 MMHG

## 2021-07-08 DIAGNOSIS — D12.6 TUBULAR ADENOMA OF COLON: Primary | ICD-10-CM

## 2021-07-08 PROBLEM — Z12.11 ENCOUNTER FOR SCREENING COLONOSCOPY: Status: ACTIVE | Noted: 2021-07-08

## 2021-07-08 PROCEDURE — 63600175 PHARM REV CODE 636 W HCPCS: Performed by: REGISTERED NURSE

## 2021-07-08 PROCEDURE — 37000008 HC ANESTHESIA 1ST 15 MINUTES: Performed by: STUDENT IN AN ORGANIZED HEALTH CARE EDUCATION/TRAINING PROGRAM

## 2021-07-08 PROCEDURE — 88305 TISSUE EXAM BY PATHOLOGIST: ICD-10-PCS | Mod: 26,,, | Performed by: PATHOLOGY

## 2021-07-08 PROCEDURE — 37000009 HC ANESTHESIA EA ADD 15 MINS: Performed by: STUDENT IN AN ORGANIZED HEALTH CARE EDUCATION/TRAINING PROGRAM

## 2021-07-08 PROCEDURE — 27201089 HC SNARE, DISP (ANY): Performed by: STUDENT IN AN ORGANIZED HEALTH CARE EDUCATION/TRAINING PROGRAM

## 2021-07-08 PROCEDURE — 25000003 PHARM REV CODE 250: Performed by: STUDENT IN AN ORGANIZED HEALTH CARE EDUCATION/TRAINING PROGRAM

## 2021-07-08 PROCEDURE — 45385 COLONOSCOPY W/LESION REMOVAL: CPT | Performed by: STUDENT IN AN ORGANIZED HEALTH CARE EDUCATION/TRAINING PROGRAM

## 2021-07-08 PROCEDURE — 88305 TISSUE EXAM BY PATHOLOGIST: CPT | Performed by: PATHOLOGY

## 2021-07-08 PROCEDURE — E9220 PRA ENDO ANESTHESIA: ICD-10-PCS | Mod: PT,,, | Performed by: REGISTERED NURSE

## 2021-07-08 PROCEDURE — 25000003 PHARM REV CODE 250: Performed by: REGISTERED NURSE

## 2021-07-08 PROCEDURE — 88305 TISSUE EXAM BY PATHOLOGIST: CPT | Mod: 26,,, | Performed by: PATHOLOGY

## 2021-07-08 PROCEDURE — E9220 PRA ENDO ANESTHESIA: HCPCS | Mod: PT,,, | Performed by: REGISTERED NURSE

## 2021-07-08 PROCEDURE — 45385 COLONOSCOPY W/LESION REMOVAL: CPT | Mod: PT,,, | Performed by: STUDENT IN AN ORGANIZED HEALTH CARE EDUCATION/TRAINING PROGRAM

## 2021-07-08 PROCEDURE — 45385 PR COLONOSCOPY,REMV LESN,SNARE: ICD-10-PCS | Mod: PT,,, | Performed by: STUDENT IN AN ORGANIZED HEALTH CARE EDUCATION/TRAINING PROGRAM

## 2021-07-08 RX ORDER — SODIUM CHLORIDE 9 MG/ML
INJECTION, SOLUTION INTRAVENOUS CONTINUOUS
Status: DISCONTINUED | OUTPATIENT
Start: 2021-07-08 | End: 2021-07-08 | Stop reason: HOSPADM

## 2021-07-08 RX ORDER — LIDOCAINE HYDROCHLORIDE 20 MG/ML
INJECTION INTRAVENOUS
Status: DISCONTINUED | OUTPATIENT
Start: 2021-07-08 | End: 2021-07-08

## 2021-07-08 RX ORDER — PROPOFOL 10 MG/ML
VIAL (ML) INTRAVENOUS
Status: DISCONTINUED | OUTPATIENT
Start: 2021-07-08 | End: 2021-07-08

## 2021-07-08 RX ORDER — PROPOFOL 10 MG/ML
VIAL (ML) INTRAVENOUS CONTINUOUS PRN
Status: DISCONTINUED | OUTPATIENT
Start: 2021-07-08 | End: 2021-07-08

## 2021-07-08 RX ADMIN — SODIUM CHLORIDE: 0.9 INJECTION, SOLUTION INTRAVENOUS at 10:07

## 2021-07-08 RX ADMIN — SODIUM CHLORIDE: 9 INJECTION, SOLUTION INTRAVENOUS at 10:07

## 2021-07-08 RX ADMIN — PROPOFOL 20 MG: 10 INJECTION, EMULSION INTRAVENOUS at 10:07

## 2021-07-08 RX ADMIN — LIDOCAINE HYDROCHLORIDE 50 MG: 20 INJECTION, SOLUTION INTRAVENOUS at 10:07

## 2021-07-08 RX ADMIN — PROPOFOL 80 MG: 10 INJECTION, EMULSION INTRAVENOUS at 10:07

## 2021-07-08 RX ADMIN — Medication 100 MCG/KG/MIN: at 10:07

## 2021-07-19 LAB
FINAL PATHOLOGIC DIAGNOSIS: NORMAL
GROSS: NORMAL
Lab: NORMAL

## 2021-08-09 ENCOUNTER — TELEPHONE (OUTPATIENT)
Dept: INTERNAL MEDICINE | Facility: CLINIC | Age: 71
End: 2021-08-09

## 2021-08-10 ENCOUNTER — OFFICE VISIT (OUTPATIENT)
Dept: INTERNAL MEDICINE | Facility: CLINIC | Age: 71
End: 2021-08-10
Payer: MEDICARE

## 2021-08-10 ENCOUNTER — HOSPITAL ENCOUNTER (OUTPATIENT)
Dept: RADIOLOGY | Facility: HOSPITAL | Age: 71
Discharge: HOME | End: 2021-08-10
Attending: NURSE PRACTITIONER
Payer: MEDICARE

## 2021-08-10 VITALS
HEIGHT: 72 IN | OXYGEN SATURATION: 99 % | HEART RATE: 88 BPM | DIASTOLIC BLOOD PRESSURE: 70 MMHG | SYSTOLIC BLOOD PRESSURE: 98 MMHG | BODY MASS INDEX: 27.98 KG/M2 | WEIGHT: 206.56 LBS

## 2021-08-10 DIAGNOSIS — R05.9 COUGH: Primary | ICD-10-CM

## 2021-08-10 DIAGNOSIS — R05.9 COUGH: ICD-10-CM

## 2021-08-10 DIAGNOSIS — J06.9 UPPER RESPIRATORY TRACT INFECTION, UNSPECIFIED TYPE: ICD-10-CM

## 2021-08-10 PROCEDURE — U0005 INFEC AGEN DETEC AMPLI PROBE: HCPCS | Performed by: NURSE PRACTITIONER

## 2021-08-10 PROCEDURE — 99999 PR PBB SHADOW E&M-EST. PATIENT-LVL III: CPT | Mod: PBBFAC,,, | Performed by: NURSE PRACTITIONER

## 2021-08-10 PROCEDURE — U0003 INFECTIOUS AGENT DETECTION BY NUCLEIC ACID (DNA OR RNA); SEVERE ACUTE RESPIRATORY SYNDROME CORONAVIRUS 2 (SARS-COV-2) (CORONAVIRUS DISEASE [COVID-19]), AMPLIFIED PROBE TECHNIQUE, MAKING USE OF HIGH THROUGHPUT TECHNOLOGIES AS DESCRIBED BY CMS-2020-01-R: HCPCS | Performed by: NURSE PRACTITIONER

## 2021-08-10 PROCEDURE — 71046 XR CHEST PA AND LATERAL: ICD-10-PCS | Mod: 26,,, | Performed by: RADIOLOGY

## 2021-08-10 PROCEDURE — 99999 PR PBB SHADOW E&M-EST. PATIENT-LVL III: ICD-10-PCS | Mod: PBBFAC,,, | Performed by: NURSE PRACTITIONER

## 2021-08-10 PROCEDURE — 99214 OFFICE O/P EST MOD 30 MIN: CPT | Mod: S$PBB,,, | Performed by: NURSE PRACTITIONER

## 2021-08-10 PROCEDURE — 71046 X-RAY EXAM CHEST 2 VIEWS: CPT | Mod: TC

## 2021-08-10 PROCEDURE — 71046 X-RAY EXAM CHEST 2 VIEWS: CPT | Mod: 26,,, | Performed by: RADIOLOGY

## 2021-08-10 PROCEDURE — 99214 PR OFFICE/OUTPT VISIT, EST, LEVL IV, 30-39 MIN: ICD-10-PCS | Mod: S$PBB,,, | Performed by: NURSE PRACTITIONER

## 2021-08-10 PROCEDURE — 99213 OFFICE O/P EST LOW 20 MIN: CPT | Mod: PBBFAC,25 | Performed by: NURSE PRACTITIONER

## 2021-08-10 RX ORDER — METHYLPREDNISOLONE 4 MG/1
TABLET ORAL
Qty: 1 PACKAGE | Refills: 0 | Status: SHIPPED | OUTPATIENT
Start: 2021-08-10 | End: 2021-10-13 | Stop reason: ALTCHOICE

## 2021-08-10 RX ORDER — AZITHROMYCIN 250 MG/1
TABLET, FILM COATED ORAL
Qty: 6 TABLET | Refills: 0 | Status: SHIPPED | OUTPATIENT
Start: 2021-08-10 | End: 2021-08-15

## 2021-08-10 RX ORDER — PROMETHAZINE HYDROCHLORIDE AND DEXTROMETHORPHAN HYDROBROMIDE 6.25; 15 MG/5ML; MG/5ML
5 SYRUP ORAL EVERY 6 HOURS PRN
Qty: 200 ML | Refills: 0 | Status: SHIPPED | OUTPATIENT
Start: 2021-08-10 | End: 2021-08-20

## 2021-08-10 RX ORDER — BENZONATATE 100 MG/1
100 CAPSULE ORAL 3 TIMES DAILY PRN
Qty: 30 CAPSULE | Refills: 0 | Status: SHIPPED | OUTPATIENT
Start: 2021-08-10 | End: 2021-08-20

## 2021-08-11 ENCOUNTER — TELEPHONE (OUTPATIENT)
Dept: INTERNAL MEDICINE | Facility: CLINIC | Age: 71
End: 2021-08-11

## 2021-08-11 LAB
SARS-COV-2 RNA RESP QL NAA+PROBE: NOT DETECTED
SARS-COV-2- CYCLE NUMBER: -1

## 2021-08-16 ENCOUNTER — PES CALL (OUTPATIENT)
Dept: ADMINISTRATIVE | Facility: CLINIC | Age: 71
End: 2021-08-16

## 2021-09-03 ENCOUNTER — PATIENT MESSAGE (OUTPATIENT)
Dept: NEUROLOGY | Facility: CLINIC | Age: 71
End: 2021-09-03

## 2021-10-11 ENCOUNTER — LAB VISIT (OUTPATIENT)
Dept: LAB | Facility: HOSPITAL | Age: 71
End: 2021-10-11
Attending: FAMILY MEDICINE
Payer: MEDICARE

## 2021-10-11 DIAGNOSIS — R73.03 PREDIABETES: ICD-10-CM

## 2021-10-11 LAB
ESTIMATED AVG GLUCOSE: 117 MG/DL (ref 68–131)
HBA1C MFR BLD: 5.7 % (ref 4–5.6)

## 2021-10-11 PROCEDURE — 36415 COLL VENOUS BLD VENIPUNCTURE: CPT | Performed by: FAMILY MEDICINE

## 2021-10-11 PROCEDURE — 83036 HEMOGLOBIN GLYCOSYLATED A1C: CPT | Performed by: FAMILY MEDICINE

## 2021-10-13 ENCOUNTER — OFFICE VISIT (OUTPATIENT)
Dept: HOME HEALTH SERVICES | Facility: CLINIC | Age: 71
End: 2021-10-13
Payer: MEDICARE

## 2021-10-13 VITALS
WEIGHT: 203 LBS | TEMPERATURE: 97 F | HEART RATE: 62 BPM | OXYGEN SATURATION: 97 % | SYSTOLIC BLOOD PRESSURE: 126 MMHG | BODY MASS INDEX: 27.5 KG/M2 | DIASTOLIC BLOOD PRESSURE: 84 MMHG | HEIGHT: 72 IN

## 2021-10-13 DIAGNOSIS — E66.3 OVERWEIGHT (BMI 25.0-29.9): ICD-10-CM

## 2021-10-13 DIAGNOSIS — Z00.00 ENCOUNTER FOR PREVENTIVE HEALTH EXAMINATION: Primary | ICD-10-CM

## 2021-10-13 DIAGNOSIS — E78.5 HYPERLIPIDEMIA, UNSPECIFIED HYPERLIPIDEMIA TYPE: ICD-10-CM

## 2021-10-13 DIAGNOSIS — R73.03 PREDIABETES: ICD-10-CM

## 2021-10-13 PROCEDURE — G0439 PPPS, SUBSEQ VISIT: HCPCS | Mod: S$GLB,,, | Performed by: NURSE PRACTITIONER

## 2021-10-13 PROCEDURE — G0439 PR MEDICARE ANNUAL WELLNESS SUBSEQUENT VISIT: ICD-10-PCS | Mod: S$GLB,,, | Performed by: NURSE PRACTITIONER

## 2021-10-22 ENCOUNTER — IMMUNIZATION (OUTPATIENT)
Dept: FAMILY MEDICINE | Facility: CLINIC | Age: 71
End: 2021-10-22
Payer: MEDICARE

## 2021-10-22 DIAGNOSIS — Z23 NEED FOR VACCINATION: Primary | ICD-10-CM

## 2021-10-22 PROCEDURE — 0003A COVID-19, MRNA, LNP-S, PF, 30 MCG/0.3 ML DOSE VACCINE: CPT | Mod: PBBFAC,PN

## 2021-10-22 PROCEDURE — 91300 COVID-19, MRNA, LNP-S, PF, 30 MCG/0.3 ML DOSE VACCINE: CPT | Mod: PBBFAC,PN

## 2021-11-01 ENCOUNTER — OFFICE VISIT (OUTPATIENT)
Dept: INTERNAL MEDICINE | Facility: CLINIC | Age: 71
End: 2021-11-01
Payer: MEDICARE

## 2021-11-01 VITALS
WEIGHT: 205.5 LBS | SYSTOLIC BLOOD PRESSURE: 110 MMHG | HEART RATE: 70 BPM | BODY MASS INDEX: 27.83 KG/M2 | HEIGHT: 72 IN | DIASTOLIC BLOOD PRESSURE: 75 MMHG

## 2021-11-01 DIAGNOSIS — R22.31 MASS OF SKIN OF SHOULDER, RIGHT: ICD-10-CM

## 2021-11-01 DIAGNOSIS — M19.90 ARTHRITIS: ICD-10-CM

## 2021-11-01 DIAGNOSIS — E55.9 VITAMIN D DEFICIENCY: ICD-10-CM

## 2021-11-01 DIAGNOSIS — Z00.00 ANNUAL PHYSICAL EXAM: Primary | ICD-10-CM

## 2021-11-01 DIAGNOSIS — E78.2 MIXED HYPERLIPIDEMIA: ICD-10-CM

## 2021-11-01 PROCEDURE — 99213 OFFICE O/P EST LOW 20 MIN: CPT | Mod: S$PBB,GC,, | Performed by: STUDENT IN AN ORGANIZED HEALTH CARE EDUCATION/TRAINING PROGRAM

## 2021-11-01 PROCEDURE — 99213 OFFICE O/P EST LOW 20 MIN: CPT | Mod: PBBFAC | Performed by: STUDENT IN AN ORGANIZED HEALTH CARE EDUCATION/TRAINING PROGRAM

## 2021-11-01 PROCEDURE — 99213 PR OFFICE/OUTPT VISIT, EST, LEVL III, 20-29 MIN: ICD-10-PCS | Mod: S$PBB,GC,, | Performed by: STUDENT IN AN ORGANIZED HEALTH CARE EDUCATION/TRAINING PROGRAM

## 2021-11-01 PROCEDURE — 99999 PR PBB SHADOW E&M-EST. PATIENT-LVL III: ICD-10-PCS | Mod: PBBFAC,GC,, | Performed by: STUDENT IN AN ORGANIZED HEALTH CARE EDUCATION/TRAINING PROGRAM

## 2021-11-01 PROCEDURE — 99999 PR PBB SHADOW E&M-EST. PATIENT-LVL III: CPT | Mod: PBBFAC,GC,, | Performed by: STUDENT IN AN ORGANIZED HEALTH CARE EDUCATION/TRAINING PROGRAM

## 2021-11-01 RX ORDER — VIT C/E/ZN/COPPR/LUTEIN/ZEAXAN 250MG-90MG
1000 CAPSULE ORAL DAILY
Qty: 30 CAPSULE | Refills: 0 | Status: SHIPPED | OUTPATIENT
Start: 2021-11-01

## 2021-11-05 ENCOUNTER — HOSPITAL ENCOUNTER (OUTPATIENT)
Dept: RADIOLOGY | Facility: HOSPITAL | Age: 71
Discharge: HOME | End: 2021-11-05
Attending: STUDENT IN AN ORGANIZED HEALTH CARE EDUCATION/TRAINING PROGRAM
Payer: MEDICARE

## 2021-11-05 DIAGNOSIS — R22.31 MASS OF SKIN OF SHOULDER, RIGHT: ICD-10-CM

## 2021-11-05 PROCEDURE — 76882 US SOFT TISSUE, UPPER EXTREMITY, RIGHT: ICD-10-PCS | Mod: 26,RT,, | Performed by: INTERNAL MEDICINE

## 2021-11-05 PROCEDURE — 76882 US LMTD JT/FCL EVL NVASC XTR: CPT | Mod: 26,RT,, | Performed by: INTERNAL MEDICINE

## 2021-11-05 PROCEDURE — 76882 US LMTD JT/FCL EVL NVASC XTR: CPT | Mod: TC,RT

## 2021-11-10 ENCOUNTER — CLINICAL SUPPORT (OUTPATIENT)
Dept: REHABILITATION | Facility: HOSPITAL | Age: 71
End: 2021-11-10
Payer: MEDICARE

## 2021-11-10 DIAGNOSIS — M19.90 ARTHRITIS: ICD-10-CM

## 2021-11-10 DIAGNOSIS — M25.562 CHRONIC PAIN OF LEFT KNEE: Primary | ICD-10-CM

## 2021-11-10 DIAGNOSIS — G89.29 CHRONIC PAIN OF LEFT KNEE: Primary | ICD-10-CM

## 2021-11-10 PROCEDURE — 97161 PT EVAL LOW COMPLEX 20 MIN: CPT | Mod: PN

## 2021-11-10 PROCEDURE — 97530 THERAPEUTIC ACTIVITIES: CPT | Mod: PN

## 2021-11-24 ENCOUNTER — CLINICAL SUPPORT (OUTPATIENT)
Dept: REHABILITATION | Facility: HOSPITAL | Age: 71
End: 2021-11-24
Payer: MEDICARE

## 2021-11-24 DIAGNOSIS — M25.562 CHRONIC PAIN OF LEFT KNEE: ICD-10-CM

## 2021-11-24 DIAGNOSIS — G89.29 CHRONIC PAIN OF LEFT KNEE: ICD-10-CM

## 2021-11-24 DIAGNOSIS — M19.90 ARTHRITIS: ICD-10-CM

## 2021-11-24 PROCEDURE — 97110 THERAPEUTIC EXERCISES: CPT | Mod: PN

## 2021-11-24 PROCEDURE — 97140 MANUAL THERAPY 1/> REGIONS: CPT | Mod: PN

## 2021-12-08 ENCOUNTER — CLINICAL SUPPORT (OUTPATIENT)
Dept: REHABILITATION | Facility: HOSPITAL | Age: 71
End: 2021-12-08
Payer: MEDICARE

## 2021-12-08 DIAGNOSIS — M19.90 ARTHRITIS: ICD-10-CM

## 2021-12-08 DIAGNOSIS — M25.562 CHRONIC PAIN OF LEFT KNEE: ICD-10-CM

## 2021-12-08 DIAGNOSIS — G89.29 CHRONIC PAIN OF LEFT KNEE: ICD-10-CM

## 2021-12-08 PROCEDURE — 97110 THERAPEUTIC EXERCISES: CPT | Mod: PN

## 2021-12-08 PROCEDURE — 97140 MANUAL THERAPY 1/> REGIONS: CPT | Mod: PN

## 2021-12-10 ENCOUNTER — CLINICAL SUPPORT (OUTPATIENT)
Dept: REHABILITATION | Facility: HOSPITAL | Age: 71
End: 2021-12-10
Payer: MEDICARE

## 2021-12-10 DIAGNOSIS — M19.90 ARTHRITIS: ICD-10-CM

## 2021-12-10 DIAGNOSIS — G89.29 CHRONIC PAIN OF LEFT KNEE: ICD-10-CM

## 2021-12-10 DIAGNOSIS — M25.562 CHRONIC PAIN OF LEFT KNEE: ICD-10-CM

## 2021-12-10 PROCEDURE — 97140 MANUAL THERAPY 1/> REGIONS: CPT | Mod: PN

## 2021-12-10 PROCEDURE — 97110 THERAPEUTIC EXERCISES: CPT | Mod: PN

## 2021-12-15 ENCOUNTER — CLINICAL SUPPORT (OUTPATIENT)
Dept: REHABILITATION | Facility: HOSPITAL | Age: 71
End: 2021-12-15
Payer: MEDICARE

## 2021-12-15 DIAGNOSIS — M25.562 CHRONIC PAIN OF LEFT KNEE: ICD-10-CM

## 2021-12-15 DIAGNOSIS — G89.29 CHRONIC PAIN OF LEFT KNEE: ICD-10-CM

## 2021-12-15 DIAGNOSIS — M19.90 ARTHRITIS: ICD-10-CM

## 2021-12-15 PROCEDURE — 97140 MANUAL THERAPY 1/> REGIONS: CPT | Mod: PN

## 2021-12-15 PROCEDURE — 97110 THERAPEUTIC EXERCISES: CPT | Mod: PN

## 2021-12-17 ENCOUNTER — CLINICAL SUPPORT (OUTPATIENT)
Dept: REHABILITATION | Facility: HOSPITAL | Age: 71
End: 2021-12-17
Payer: MEDICARE

## 2021-12-17 DIAGNOSIS — M19.90 ARTHRITIS: ICD-10-CM

## 2021-12-17 DIAGNOSIS — M25.562 CHRONIC PAIN OF LEFT KNEE: ICD-10-CM

## 2021-12-17 DIAGNOSIS — G89.29 CHRONIC PAIN OF LEFT KNEE: ICD-10-CM

## 2021-12-17 PROCEDURE — 97110 THERAPEUTIC EXERCISES: CPT | Mod: PN,CQ

## 2021-12-17 PROCEDURE — 97140 MANUAL THERAPY 1/> REGIONS: CPT | Mod: PN,CQ

## 2022-01-03 ENCOUNTER — CLINICAL SUPPORT (OUTPATIENT)
Dept: REHABILITATION | Facility: HOSPITAL | Age: 72
End: 2022-01-03
Payer: MEDICARE

## 2022-01-03 DIAGNOSIS — M25.562 CHRONIC PAIN OF LEFT KNEE: ICD-10-CM

## 2022-01-03 DIAGNOSIS — M19.90 ARTHRITIS: ICD-10-CM

## 2022-01-03 DIAGNOSIS — G89.29 CHRONIC PAIN OF LEFT KNEE: ICD-10-CM

## 2022-01-03 PROCEDURE — 97110 THERAPEUTIC EXERCISES: CPT | Mod: PN

## 2022-01-03 NOTE — PROGRESS NOTES
[facility]   Physical Therapy Treatment Note     Name: Herman Chirinos  Clinic Number: [id number]    Therapy Diagnosis:   Encounter Diagnoses   Name Primary?    Chronic pain of left knee     Arthritis      Physician: Shoan Trujillo MD    Visit Date: 1/3/2022    Physician Orders: PT Eval and Treat   Medical Diagnosis from Referral: M19.90 (ICD-10-CM) - Arthritis  Evaluation Date: 11/10/2021  Authorization Period Expiration: 12/31/2022  Plan of Care Expiration: 1/19/2022  Visit # / Visits authorized: 7/ 50   FOTO: 7/ 15 (completed 1 on 12/15/2021)  PTA Visit #: 0/5     Time In: 10:45  Time Out: 11:30  Total Billable Time: 45 minutes (2TE, 1MT)    Precautions: Standard    SUBJECTIVE     Pt reports: mainly notices his knee pain when performing prolonged weightbearing activities  He was not compliant with home exercise program.  Response to previous treatment: decreased stiffness  Functional change: improving mobility    Pain: 1/10 (5 at most)  Location: Left Knee    OBJECTIVE     1/3/2022  Left Knee AROM = 1 degree to 96 degrees    Treatment     Herman received the treatments listed below:      Therapeutic Exercises to develop strength, endurance, ROM and flexibility for 40 minutes including: (bulleted exercises only)  · Quad sets - 5 second holds x20  · Stair Lunges - 2 minutes  Short arc quad - 3x10; 3# ankle weights  Long arc quad - 3x10; 3# ankle weights  Straight Leg Raise - 3x10; 3# ankle weights  · Clamshells - green TB; 3x10 bilateral  · Knee extension stretch - low load, long duration; 3# 5 minutes  · Free Motion Cable walk outs - 23#; 5 laps  Hamstring stretch - long sitting; 45 second holds x3  · Standing TKE - Blue TB 3x10  · Recumbent Bike - 5 minutes; Seat @ 12; level 2.5 hills to improve lower extremity strength and cardiovascular endurance utilizing different speeds and resistances.     Manual Therapy Techniques: Joint mobilizations were applied to the: Left Knee for 0  minutes, including:  Left knee stretching for flexion and extension supine and seated   Left patella mobilizations in all planes  Left hyperextension mobilizations to improve extension    Patient Education and Home Exercises     Home Exercises Provided and Patient Education Provided     Education provided:   Symptom management and plan of care progressions.  Home Exercise Program.    Written Home Exercises Provided: Patient instructed to cont prior HEP. Exercises were reviewed and Herman was able to demonstrate them prior to the end of the session.  Herman demonstrated good  understanding of the education provided. See EMR under Patient Instructions for exercises provided during therapy sessions    ASSESSMENT     Continues to struggle with attaining terminal knee extension. Updated HEP with creep stretch. Reviewed importance of HEP compliance. No exacerbation in symptoms upon completion of today's treatments.     From Initial Evaluation on 11/10/2021 - Herman is a 71 y.o. male referred to outpatient Physical Therapy with a medical diagnosis of arthritis. Patient presents with signs and symptoms consistent with bilateral knee osteoarthritis with the Left knee being more affected than the right knee. Presence of left knee flexion contracture, varus deformity, and ankle pronation which are primary contributing factors to overall condition. He will benefit from progressive physical therapy addressing, hip/knee strength, ROM, and postural awareness.    Herman is progressing well towards his goals.   Pt prognosis is Fair.     Pt will continue to benefit from skilled outpatient physical therapy to address the deficits listed in the problem list box on initial evaluation, provide pt/family education and to maximize pt's level of independence in the home and community environment.     Pt's spiritual, cultural and educational needs considered and pt agreeable to plan of care and goals.     Anticipated barriers to physical  therapy: Bony deformities    Goals:  Short Term Goals: 3-4 weeks   1. Patient will have reduced pain complaints from 6/10 to less than or equal to 4/10. (Not Met - Progressing)  2. Patient will demonstrate increased AROM/PROM by approximately 25% to 50% of initial measurements. (Not Met - Progressing)  3. Patient will demonstrate increased muscle strength of at least one-half grade as compared to the initial measurements. (Not Met - Progressing)     Long Term Goals: 6-8 weeks   1. Patient will have reduced pain complaints from 4/10 to less than or equal to 2/10. (Not Met - Progressing)  2. Patient will demonstrate increased AROM/PROM by approximately 75% to 100% of initial measurements. (Not Met - Progressing)  3. Patient will demonstrate increased muscle strength of at least one grade as compared to the initial measurements. (Not Met - Progressing)  4. Patient will improve Knee FOTO limitation score from 47% to less than or equal to 39%. (Not Met - Progressing)  5. Patient will be independent with their home exercise program. (Not Met - Progressing)    PLAN     Promote knee terminal extension and flexion as tolerated. Progress quad strength.    Plan of care Certification: 11/10/2021 to 1/19/2022.  Outpatient Physical Therapy 2 times weekly for 6 weeks to include the following interventions: Electrical Stimulation with dry needling, Gait Training, Manual Therapy, Neuromuscular Re-ed, Orthotic Management and Training, Patient Education, Self Care, Therapeutic Activites, Therapeutic Exercise and dry needling and taping.    Laz Sampson, PT   1/3/2022

## 2022-01-05 ENCOUNTER — CLINICAL SUPPORT (OUTPATIENT)
Dept: REHABILITATION | Facility: HOSPITAL | Age: 72
End: 2022-01-05
Payer: MEDICARE

## 2022-01-05 DIAGNOSIS — M19.90 ARTHRITIS: ICD-10-CM

## 2022-01-05 DIAGNOSIS — M25.562 CHRONIC PAIN OF LEFT KNEE: ICD-10-CM

## 2022-01-05 DIAGNOSIS — G89.29 CHRONIC PAIN OF LEFT KNEE: ICD-10-CM

## 2022-01-05 PROCEDURE — 97140 MANUAL THERAPY 1/> REGIONS: CPT | Mod: PN

## 2022-01-05 PROCEDURE — 97110 THERAPEUTIC EXERCISES: CPT | Mod: PN

## 2022-01-05 NOTE — PROGRESS NOTES
[facility]   Physical Therapy Plan of Care Note     Name: Herman Chirinos  Clinic Number: [id number]    Therapy Diagnosis:   Encounter Diagnoses   Name Primary?    Chronic pain of left knee     Arthritis      Physician: Shona Trujillo MD    Visit Date: 1/5/2022    Physician Orders: PT Eval and Treat   Medical Diagnosis from Referral: M19.90 (ICD-10-CM) - Arthritis  Evaluation Date: 11/10/2021  Authorization Period Expiration: 12/31/2022  Plan of Care Expiration: 1/24/2022  Visit # / Visits authorized: 8/ 50   FOTO: 8/15 (completed 1 on 12/15/2021)  PTA Visit #: 0/5     Time In: 8:30AM  Time Out: 9:15AM  Total Billable Time: 45 minutes (2 TE + 1 MT)    Precautions: Standard    SUBJECTIVE     Pt reports: overall he notes he is feeling okay, but a little stiff. He notes he notices an increase in symptoms when he needs to bend down or stoop down.  He was compliant with home exercise program.  Response to previous treatment: stiffness, but attributes it to the cold weather  Functional change: improving mobility    Pain: 1/10 (5 at most)  Location: Left Knee    OBJECTIVE     1/3/2022  Left Knee AROM = 1 degree to 96 degrees    1/5/2022  Left Knee AAROM = 1 degree to 100 degrees    Treatment     Herman received the treatments listed below:      Therapeutic Exercises to develop strength, endurance, ROM and flexibility for 35 minutes including: (bulleted exercises only)  · Free Motion Cable walk outs - 23#; 10 laps  · Prone hang OOB - 5# for 4 minutes   · Long arc quad and AAROM knee flexion - 3x10 with 3 second hold; 3# ankle weights  · Stair Lunges - 2 minutes  · Recumbent Bike - 5 minutes; Seat @ 12; level 2.5 hills to improve lower extremity strength and cardiovascular endurance utilizing different speeds and resistances.   Quad sets - 5 second holds x20  Short arc quad - 3x10; 3# ankle weights  Straight Leg Raise - 3x10; 3# ankle weights  Clamshells - green TB; 3x10 bilateral  Knee  extension stretch - low load, long duration; 3# 5 minutes  Hamstring stretch - long sitting; 45 second holds x3  Standing TKE - Blue TB 3x10    Manual Therapy Techniques: Joint mobilizations were applied to the: Left Knee for 10 minutes, including (bulleted exercises only):  · Left knee stretching for flexion and extension supine  · Left hyperextension mobilizations to improve extension  Left patella mobilizations in all planes    Patient Education and Home Exercises     Home Exercises Provided and Patient Education Provided     Education provided:   Symptom management and plan of care progressions.  Home Exercise Program.    Written Home Exercises Provided: Patient instructed to cont prior HEP. Exercises were reviewed and Herman was able to demonstrate them prior to the end of the session.  Herman demonstrated good  understanding of the education provided. See EMR under Patient Instructions for exercises provided during therapy sessions    ASSESSMENT   Patient shows signs of improvement as demonstrated by his ability to perform weightbearing activities with increasing difficulty, improving neuromuscular control over the L quadriceps muscle, increasing ROM, decreased pain with activities, and ability to tolerate full PT session with no adverse events or increases in symptoms between sessions. PT still recommends patient to incorporate creep in HEP in order to reach terminal knee extension. Reviewed importance of HEP compliance. He will benefit from continued skilled therapy services in order to improve AROM of the L knee, build on strength of quadriceps and gluteal muscles to protect the knee joint, and incorporate endurance training in order to return to recreational activities and ADLs safely and properly.     From Initial Evaluation on 11/10/2021 - Herman is a 71 y.o. male referred to outpatient Physical Therapy with a medical diagnosis of arthritis. Patient presents with signs and symptoms consistent with  bilateral knee osteoarthritis with the Left knee being more affected than the right knee. Presence of left knee flexion contracture, varus deformity, and ankle pronation which are primary contributing factors to overall condition. He will benefit from progressive physical therapy addressing, hip/knee strength, ROM, and postural awareness.    Herman is progressing well towards his goals.   Pt prognosis is Good.     Pt will continue to benefit from skilled outpatient physical therapy to address the deficits listed in the problem list box on initial evaluation, provide pt/family education and to maximize pt's level of independence in the home and community environment.     Pt's spiritual, cultural and educational needs considered and pt agreeable to plan of care and goals.     Anticipated barriers to physical therapy: Bony deformities    Goals:  Short Term Goals: 3-4 weeks   1. Patient will have reduced pain complaints from 6/10 to less than or equal to 4/10. (Not Met - Progressing)  2. Patient will demonstrate increased AROM/PROM by approximately 25% to 50% of initial measurements. (Not Met - Progressing)  3. Patient will demonstrate increased muscle strength of at least one-half grade as compared to the initial measurements. (Not Met - Progressing)     Long Term Goals: 6-8 weeks   1. Patient will have reduced pain complaints from 4/10 to less than or equal to 2/10. (Not Met - Progressing)  2. Patient will demonstrate increased AROM/PROM by approximately 75% to 100% of initial measurements. (Not Met - Progressing)  3. Patient will demonstrate increased muscle strength of at least one grade as compared to the initial measurements. (Not Met - Progressing)  4. Patient will improve Knee FOTO limitation score from 47% to less than or equal to 39%. (Not Met - Progressing)  5. Patient will be independent with their home exercise program. (Not Met - Progressing)    Reasons for Recertification of Therapy:   Extending POC      Plan     Updated Certification Period: 1/5/2022 to 1/24/2022  Recommended Treatment Plan: 2 times per week for 3 weeks: Electrical Stimulation with dry needling, Gait Training, Manual Therapy, Neuromuscular Re-ed, Orthotic Management and Training, Patient Education, Self Care, Therapeutic Activites, Therapeutic Exercise and dry needling and taping.    Other Recommendations: anticipated discharge at end of scheduled visits. Finish remaining scheduled visits and progress as tolerated.    Bee Saavedra, SPT  1/5/2022

## 2022-01-11 ENCOUNTER — CLINICAL SUPPORT (OUTPATIENT)
Dept: REHABILITATION | Facility: HOSPITAL | Age: 72
End: 2022-01-11
Payer: MEDICARE

## 2022-01-11 DIAGNOSIS — M25.562 CHRONIC PAIN OF LEFT KNEE: ICD-10-CM

## 2022-01-11 DIAGNOSIS — G89.29 CHRONIC PAIN OF LEFT KNEE: ICD-10-CM

## 2022-01-11 DIAGNOSIS — M19.90 ARTHRITIS: ICD-10-CM

## 2022-01-11 PROCEDURE — 97140 MANUAL THERAPY 1/> REGIONS: CPT | Mod: PN,CQ

## 2022-01-11 PROCEDURE — 97110 THERAPEUTIC EXERCISES: CPT | Mod: PN,CQ

## 2022-01-11 NOTE — PROGRESS NOTES
[facility] OUTPATIENT THERAPY AND WELLNESS   Physical Therapy Daily Treatment Note     Name: Herman Chirinos  Clinic Number: [id number]    Therapy Diagnosis:   Encounter Diagnoses   Name Primary?    Chronic pain of left knee     Arthritis      Physician: Shona Trujillo MD    Visit Date: 1/11/2022    Physician Orders: PT Eval and Treat   Medical Diagnosis from Referral: M19.90 (ICD-10-CM) - Arthritis  Evaluation Date: 11/10/2021  Authorization Period Expiration: 12/31/2022  Plan of Care Expiration: 1/24/2022  Visit # / Visits authorized: 3/ 50   FOTO: 9/15 (completed 1 on 12/15/2021)  PTA Visit #: 1/5     Time In: 11:30 AM  Time Out: 12:15 AM  Total Billable Time: 45 minutes (2 TE + 1 MT)    Precautions: Standard    SUBJECTIVE     Pt reports: Continues to present with minimal to no complaints of pain. Primary complaint of knee stiffness.   He was compliant with home exercise program.  Response to previous treatment: stiffness, but attributes it to the cold weather  Functional change: improving mobility    Pain: 1/10   Location: Left Knee    OBJECTIVE     1/3/2022  Left Knee AROM = 1 degree to 96 degrees    1/5/2022  Left Knee AAROM = 1 degree to 100 degrees    Treatment     Herman received the treatments listed below:      Therapeutic Exercises to develop strength, endurance, ROM and flexibility for 35 minutes including: (bulleted exercises only)  · Free Motion Cable walk outs - 23#; 10 laps  · Prone hang OOB - 5# for 4 minutes   · Long arc quad and AAROM knee flexion - 3x10 with 3 second hold; 3# ankle weights  · Stair Lunges - 2 minutes  · Recumbent Bike - 5 minutes; Seat @ 12; level 2.5 hills to improve lower extremity strength and cardiovascular endurance utilizing different speeds and resistances.   · Leg Press - 5 plates DL; sled at 4  Quad sets - 5 second holds x20  Short arc quad - 3x10; 3# ankle weights  Straight Leg Raise - 3x10; 3# ankle weights  Clamshells - green TB; 3x10 bilateral  Knee extension  stretch - low load, long duration; 3# 5 minutes  Hamstring stretch - long sitting; 45 second holds x3  Standing TKE - Blue TB 3x10    Manual Therapy Techniques: Joint mobilizations were applied to the: Left Knee for 10 minutes, including (bulleted exercises only):  · Left knee stretching for flexion and extension supine  · Left hyperextension mobilizations to improve extension  Left patella mobilizations in all planes    Patient Education and Home Exercises     Home Exercises Provided and Patient Education Provided     Education provided:   Symptom management and plan of care progressions.  Home Exercise Program.    Written Home Exercises Provided: Patient instructed to cont prior HEP. Exercises were reviewed and Herman was able to demonstrate them prior to the end of the session.  Herman demonstrated good  understanding of the education provided. See EMR under Patient Instructions for exercises provided during therapy sessions    ASSESSMENT   Patient continues to present with knee pain well managed but moderate stiffness. Moderate improvement in stiffness following bike. Implemented leg press in order to continue challenging quad strength and knee flexion ROM with good tolerance and appropriate fatigue. He will benefit from continued skilled therapy services in order to improve AROM of the L knee, build on strength of quadriceps and gluteal muscles to protect the knee joint, and incorporate endurance training in order to return to recreational activities and ADLs safely and properly.     From Initial Evaluation on 11/10/2021 - Herman is a 71 y.o. male referred to outpatient Physical Therapy with a medical diagnosis of arthritis. Patient presents with signs and symptoms consistent with bilateral knee osteoarthritis with the Left knee being more affected than the right knee. Presence of left knee flexion contracture, varus deformity, and ankle pronation which are primary contributing factors to overall condition. He  will benefit from progressive physical therapy addressing, hip/knee strength, ROM, and postural awareness.    Herman is progressing well towards his goals.   Pt prognosis is Good.     Pt will continue to benefit from skilled outpatient physical therapy to address the deficits listed in the problem list box on initial evaluation, provide pt/family education and to maximize pt's level of independence in the home and community environment.     Pt's spiritual, cultural and educational needs considered and pt agreeable to plan of care and goals.     Anticipated barriers to physical therapy: Bony deformities    Goals:  Short Term Goals: 3-4 weeks   1. Patient will have reduced pain complaints from 6/10 to less than or equal to 4/10. (Not Met - Progressing)  2. Patient will demonstrate increased AROM/PROM by approximately 25% to 50% of initial measurements. (Not Met - Progressing)  3. Patient will demonstrate increased muscle strength of at least one-half grade as compared to the initial measurements. (Not Met - Progressing)     Long Term Goals: 6-8 weeks   1. Patient will have reduced pain complaints from 4/10 to less than or equal to 2/10. (Not Met - Progressing)  2. Patient will demonstrate increased AROM/PROM by approximately 75% to 100% of initial measurements. (Not Met - Progressing)  3. Patient will demonstrate increased muscle strength of at least one grade as compared to the initial measurements. (Not Met - Progressing)  4. Patient will improve Knee FOTO limitation score from 47% to less than or equal to 39%. (Not Met - Progressing)  5. Patient will be independent with their home exercise program. (Not Met - Progressing)    Reasons for Recertification of Therapy:   Extending POC     Plan     Updated Certification Period: 1/11/2022 to 1/24/2022  Recommended Treatment Plan: 2 times per week for 3 weeks: Electrical Stimulation with dry needling, Gait Training, Manual Therapy, Neuromuscular Re-ed, Orthotic Management  and Training, Patient Education, Self Care, Therapeutic Activites, Therapeutic Exercise and dry needling and taping.    Other Recommendations: anticipated discharge at end of scheduled visits. Finish remaining scheduled visits and progress as tolerated.    Jacqueline Mcdonald, PTA  1/11/2022

## 2022-01-14 ENCOUNTER — CLINICAL SUPPORT (OUTPATIENT)
Dept: REHABILITATION | Facility: HOSPITAL | Age: 72
End: 2022-01-14
Payer: MEDICARE

## 2022-01-14 DIAGNOSIS — M25.562 CHRONIC PAIN OF LEFT KNEE: ICD-10-CM

## 2022-01-14 DIAGNOSIS — G89.29 CHRONIC PAIN OF LEFT KNEE: ICD-10-CM

## 2022-01-14 DIAGNOSIS — M19.90 ARTHRITIS: ICD-10-CM

## 2022-01-14 PROCEDURE — 97140 MANUAL THERAPY 1/> REGIONS: CPT | Mod: PN,CQ

## 2022-01-14 PROCEDURE — 97110 THERAPEUTIC EXERCISES: CPT | Mod: PN,CQ

## 2022-01-14 NOTE — PROGRESS NOTES
[facility]   Physical Therapy Daily Treatment Note     Name: Herman Chirinos  [facility] Number: [id number]    Therapy Diagnosis:   Encounter Diagnoses   Name Primary?    Chronic pain of left knee     Arthritis      Physician: Shona Trujillo MD    Visit Date: 1/14/2022    Physician Orders: PT Eval and Treat   Medical Diagnosis from Referral: M19.90 (ICD-10-CM) - Arthritis  Evaluation Date: 11/10/2021  Authorization Period Expiration: 12/31/2022  Plan of Care Expiration: 1/24/2022  Visit # / Visits authorized: 3/ 50   FOTO: 9/15 (completed 1 on 12/15/2021)  PTA Visit #: 1/5     Time In: 8:50 AM  Time Out: 9:35 AM  Total Billable Time: 45 minutes (2 TE + 1 MT)    Precautions: Standard    SUBJECTIVE     Pt reports: Continues to present with minimal to no complaints of pain. Primary complaint of knee stiffness. Patient agreeable to PT session.   He was compliant with home exercise program.  Response to previous treatment: stiffness, but attributes it to the cold weather  Functional change: improving mobility    Pain: 1/10   Location: Left Knee    OBJECTIVE     1/3/2022  Left Knee AROM = 1 degree to 96 degrees    1/5/2022  Left Knee AAROM = 1 degree to 100 degrees    Treatment     Herman received the treatments listed below:      Therapeutic Exercises to develop strength, endurance, ROM and flexibility for 35 minutes including: (bulleted exercises only)  · Free Motion Cable walk outs - 23#; 10 laps   · Short arc quad - 3x10; 3# ankle weights  · Prone hang OOB - 5# for 5 minutes   · [employer]bex Long arc quad and AAROM knee flexion - 2x10 with 3 second hold; 5# ankle weights  · Stair Lunges - 2 minutes  · Recumbent Bike - 5 minutes; Seat @ 12; level 2.5 hills to improve lower extremity strength and cardiovascular endurance utilizing different speeds and resistances.   · Leg Press - 5 plates DL; sled at 4 3x10  Quad sets - 5 second holds x20  Straight Leg Raise - 3x10; 3# ankle weights  Clamshells -  "green TB; 3x10 bilateral  Knee extension stretch - low load, long duration; 3# 5 minutes  Hamstring stretch - long sitting; 45 second holds x3  Standing TKE - Blue TB 3x10    Manual Therapy Techniques: Joint mobilizations were applied to the: Left Knee for 10 minutes, including (bulleted exercises only):  · Left knee stretching for flexion and extension supine  · Left hyperextension mobilizations to improve extension  Left patella mobilizations in all planes    Patient Education and Home Exercises     Home Exercises Provided and Patient Education Provided     Education provided:   Symptom management and plan of care progressions.  Home Exercise Program.    Written Home Exercises Provided: Patient instructed to cont prior HEP. Exercises were reviewed and Herman was able to demonstrate them prior to the end of the session.  Herman demonstrated good  understanding of the education provided. See EMR under Patient Instructions for exercises provided during therapy sessions    ASSESSMENT   Patient continues to present with minimal knee pain but moderate stiffness. Moderate improvement in stiffness following bike. Able to complete all therex with no exacerbation of sx and states he feels fatigued but "loose" post session. He will benefit from continued skilled therapy services in order to improve AROM of the L knee, build on strength of quadriceps and gluteal muscles to protect the knee joint, and incorporate endurance training in order to return to recreational activities and ADLs safely and properly.     From Initial Evaluation on 11/10/2021 - Herman is a 71 y.o. male referred to outpatient Physical Therapy with a medical diagnosis of arthritis. Patient presents with signs and symptoms consistent with bilateral knee osteoarthritis with the Left knee being more affected than the right knee. Presence of left knee flexion contracture, varus deformity, and ankle pronation which are primary contributing factors to overall " condition. He will benefit from progressive physical therapy addressing, hip/knee strength, ROM, and postural awareness.    Herman is progressing well towards his goals.   Pt prognosis is Good.     Pt will continue to benefit from skilled outpatient physical therapy to address the deficits listed in the problem list box on initial evaluation, provide pt/family education and to maximize pt's level of independence in the home and community environment.     Pt's spiritual, cultural and educational needs considered and pt agreeable to plan of care and goals.     Anticipated barriers to physical therapy: Bony deformities    Goals:  Short Term Goals: 3-4 weeks   1. Patient will have reduced pain complaints from 6/10 to less than or equal to 4/10. (Not Met - Progressing)  2. Patient will demonstrate increased AROM/PROM by approximately 25% to 50% of initial measurements. (Not Met - Progressing)  3. Patient will demonstrate increased muscle strength of at least one-half grade as compared to the initial measurements. (Not Met - Progressing)     Long Term Goals: 6-8 weeks   1. Patient will have reduced pain complaints from 4/10 to less than or equal to 2/10. (Not Met - Progressing)  2. Patient will demonstrate increased AROM/PROM by approximately 75% to 100% of initial measurements. (Not Met - Progressing)  3. Patient will demonstrate increased muscle strength of at least one grade as compared to the initial measurements. (Not Met - Progressing)  4. Patient will improve Knee FOTO limitation score from 47% to less than or equal to 39%. (Not Met - Progressing)  5. Patient will be independent with their home exercise program. (Not Met - Progressing)    Reasons for Recertification of Therapy:   Extending POC     Plan     Updated Certification Period: 1/14/2022 to 1/24/2022  Recommended Treatment Plan: 2 times per week for 3 weeks: Electrical Stimulation with dry needling, Gait Training, Manual Therapy, Neuromuscular Re-ed,  Orthotic Management and Training, Patient Education, Self Care, Therapeutic Activites, Therapeutic Exercise and dry needling and taping.    Other Recommendations: anticipated discharge at end of scheduled visits. Finish remaining scheduled visits and progress as tolerated.    Jacqueline Mcdonald, PTA  1/14/2022

## 2022-01-19 ENCOUNTER — CLINICAL SUPPORT (OUTPATIENT)
Dept: REHABILITATION | Facility: HOSPITAL | Age: 72
End: 2022-01-19
Payer: MEDICARE

## 2022-01-19 DIAGNOSIS — G89.29 CHRONIC PAIN OF LEFT KNEE: ICD-10-CM

## 2022-01-19 DIAGNOSIS — M25.562 CHRONIC PAIN OF LEFT KNEE: ICD-10-CM

## 2022-01-19 DIAGNOSIS — M19.90 ARTHRITIS: ICD-10-CM

## 2022-01-19 PROCEDURE — 97140 MANUAL THERAPY 1/> REGIONS: CPT | Mod: KX,PN

## 2022-01-19 PROCEDURE — 97110 THERAPEUTIC EXERCISES: CPT | Mod: KX,PN

## 2022-01-19 NOTE — PROGRESS NOTES
"[facility]   Physical Therapy Daily Treatment Note     Name: Herman Chirinos  Clinic Number: [id number]    Therapy Diagnosis:   Encounter Diagnoses   Name Primary?    Chronic pain of left knee     Arthritis      Physician: Shona Trujillo MD    Visit Date: 1/19/2022    Physician Orders: PT Eval and Treat   Medical Diagnosis from Referral: M19.90 (ICD-10-CM) - Arthritis  Evaluation Date: 11/10/2021  Authorization Period Expiration: 12/31/2022  Plan of Care Expiration: 1/24/2022  Visit # / Visits authorized: 5/ 50  (11 total)  FOTO: 9/15 (completed 1 on 12/15/2021)  PTA Visit #: 1/5     Time In: 12:50 PM  Time Out: 1:30 PM  Total Billable Time: 40 minutes (2 TE + 1 MT)    Precautions: Standard    SUBJECTIVE     Pt reports: he's been doing fair and moving knee to keep it loose.  He feels he's walking better. Patient agreeable to PT session.   He was compliant with home exercise program.  Response to previous treatment: loosened  Functional change: improving mobility and walking    Pain: 1/10   Location: Left Knee    OBJECTIVE     1/19/2022  Left Knee AROM = 1 degree to 96 degrees    Treatment     Herman received the treatments listed below:      Therapeutic Exercises to develop strength, endurance, ROM and flexibility for 30 minutes including: (bulleted exercises only)  · Free Motion Cable walk outs - 23#; 10 laps   · Short arc quad - 3x10; 3# ankle weights  Prone hang OOB - 5# for 5 minutes   ·  Long arc quad and AAROM knee flexion -GTB 3x10 2"  · Stair Lunges - 2 minutes  · Recumbent Bike - 5 minutes; Seat @ 12; level 2.5 hills to improve lower extremity strength and cardiovascular endurance utilizing different speeds and resistances.   · Leg Press - 5.5 plates DL; sled at 4 3x10  Quad sets - 5 second holds x20  Straight Leg Raise - 3x10; 3# ankle weights  Clamshells - green TB; 3x10 bilateral  Knee extension stretch - low load, long duration; 3# 5 minutes  Hamstring stretch - long " sitting; 45 second holds x3  Standing TKE - Blue TB 3x10    Manual Therapy Techniques: Joint mobilizations were applied to the: Left Knee for 10 minutes, including (bulleted exercises only):  · Left knee stretching for flexion and extension supine  · Left hyperextension mobilizations to improve extension  Left patella mobilizations in all planes    Patient Education and Home Exercises     Home Exercises Provided and Patient Education Provided     Education provided:   Symptom management and plan of care progressions.  Home Exercise Program.    Written Home Exercises Provided: Patient instructed to cont prior HEP. Exercises were reviewed and Herman was able to demonstrate them prior to the end of the session.  Herman demonstrated good  understanding of the education provided. See EMR under Patient Instructions for exercises provided during therapy sessions    ASSESSMENT   Patient continues to present with minimal knee pain but moderate stiffness. Pt tolerated session well with ability to perform further standing there-ex without significant provocation of pain and reduction in stiffness following treatment. Pt appears to be reaching a plateau with knee ROM. Began discharge planning this visit with updated HEP provided with good understanding. Pt educated on the importance of consistency with activity and strengthening exercises in order to reduce his stiffness and improve strength and ROM. Discussed tentative discharge for next visit. Monitor response to treatment and will determine appropriateness for d/c next visit.        From Initial Evaluation on 11/10/2021 - Herman is a 71 y.o. male referred to outpatient Physical Therapy with a medical diagnosis of arthritis. Patient presents with signs and symptoms consistent with bilateral knee osteoarthritis with the Left knee being more affected than the right knee. Presence of left knee flexion contracture, varus deformity, and ankle pronation which are primary  contributing factors to overall condition. He will benefit from progressive physical therapy addressing, hip/knee strength, ROM, and postural awareness.    Herman is progressing well towards his goals.   Pt prognosis is Good.     Pt will continue to benefit from skilled outpatient physical therapy to address the deficits listed in the problem list box on initial evaluation, provide pt/family education and to maximize pt's level of independence in the home and community environment.     Pt's spiritual, cultural and educational needs considered and pt agreeable to plan of care and goals.     Anticipated barriers to physical therapy: Bony deformities    Goals:  Short Term Goals: 3-4 weeks   1. Patient will have reduced pain complaints from 6/10 to less than or equal to 4/10. (MET)  2. Patient will demonstrate increased AROM/PROM by approximately 25% to 50% of initial measurements. (Not Met - Progressing)  3. Patient will demonstrate increased muscle strength of at least one-half grade as compared to the initial measurements. (Not Met - Progressing)     Long Term Goals: 6-8 weeks   1. Patient will have reduced pain complaints from 4/10 to less than or equal to 2/10. (MET)  2. Patient will demonstrate increased AROM/PROM by approximately 75% to 100% of initial measurements. (Not Met - Progressing)  3. Patient will demonstrate increased muscle strength of at least one grade as compared to the initial measurements. (Not Met - Progressing)  4. Patient will improve Knee FOTO limitation score from 47% to less than or equal to 39%. (Not Met - Progressing)  5. Patient will be independent with their home exercise program. (Not Met - Progressing)       Plan     Updated Certification Period: 1/19/2022 to 1/24/2022  Recommended Treatment Plan: 2 times per week for 3 weeks: Electrical Stimulation with dry needling, Gait Training, Manual Therapy, Neuromuscular Re-ed, Orthotic Management and Training, Patient Education, Self Care,  Therapeutic Activites, Therapeutic Exercise and dry needling and taping.    Other Recommendations: anticipated discharge at end of scheduled visits. Finish remaining scheduled visits and progress as tolerated.    GILMER MOORE, PT  1/19/2022

## 2022-01-24 ENCOUNTER — CLINICAL SUPPORT (OUTPATIENT)
Dept: REHABILITATION | Facility: HOSPITAL | Age: 72
End: 2022-01-24
Payer: MEDICARE

## 2022-01-24 DIAGNOSIS — M19.90 ARTHRITIS: ICD-10-CM

## 2022-01-24 DIAGNOSIS — G89.29 CHRONIC PAIN OF LEFT KNEE: ICD-10-CM

## 2022-01-24 DIAGNOSIS — M25.562 CHRONIC PAIN OF LEFT KNEE: ICD-10-CM

## 2022-01-24 PROCEDURE — 97110 THERAPEUTIC EXERCISES: CPT | Mod: PN

## 2022-01-24 PROCEDURE — 97140 MANUAL THERAPY 1/> REGIONS: CPT | Mod: PN

## 2022-01-24 NOTE — PROGRESS NOTES
[facility]   Physical Therapy Daily Treatment Note/ discharge summary    Name: Herman Chirinos  Clinic Number: [id number]    Therapy Diagnosis:   Encounter Diagnoses   Name Primary?    Chronic pain of left knee     Arthritis      Physician: Shona Trujillo MD    Visit Date: 1/24/2022    Physician Orders: PT Eval and Treat   Medical Diagnosis from Referral: M19.90 (ICD-10-CM) - Arthritis  Evaluation Date: 11/10/2021  Authorization Period Expiration: 12/31/2022  Plan of Care Expiration: 1/24/2022  Visit # / Visits authorized: 6/50  (12 total)  FOTO: 11/15 (completed 2 on 1/24/2021)  PTA Visit #: 1/5     Time In: 12:55 PM  Time Out: 1:35 PM  Total Billable Time: 40 minutes (2 TE + 1 MT)    Precautions: Standard    SUBJECTIVE     Pt reports: he is doing alright today. Pain comes and goes and he can't walk long distances but much better than when he started. Pt agrees to PT. He was able to review updated HEP and was able to perform independently.  He was compliant with home exercise program.  Response to previous treatment: loosened  Functional change: improving mobility and walking    Pain: 2/10   Location: Left Knee    OBJECTIVE     Posture:  · Lumbar: decreased lordosis  · Standing: bilateral knee varus, bilateral ankle pronation     Gait:  · Decreased terminal knee extension      AROM:       Knee   Action Left Right   Flexion 90 degrees 120 degrees   Extension 5 degrees 0 degrees      PROM:       Knee   Action Left Right   Flexion 97 degrees 122 degrees   Extension 3 degrees +1 degrees      Manual Muscle Testing:       Knee   Action Left Right   Flexion 5 5   Extension 5 5           Hip   Action Left Right   Flexion 4+ 4+   Extension 4 4   Abduction 4+ 4+   Adduction 4+ 4+    Palpation: no TTP   joint mobility: significant decreased patellar mobility B     Special Tests:  ·  Negative (-) Tests = anterior drawer, posterior drawer, deep patellar tendon palpation patellar grind, patellar  "compression      Limitation/Restriction for FOTO Knee Survey     Therapist reviewed FOTO scores for Herman Chirinos on 1/24/2021.   FOTO documents entered into EPIC - see Media section.     Limitation Score: 45%       Treatment     Herman received the treatments listed below:      Therapeutic Exercises to develop strength, endurance, ROM and flexibility for 30 minutes including: (bulleted exercises only)  · Free Motion Cable walk outs - 23#; 10 laps   Short arc quad - 3x10; 3# ankle weights  Prone hang OOB - 5# for 5 minutes   ·  Long arc quad and AAROM knee flexion -GTB 3x10 2"  · Stair Lunges - 2 minutes  · Recumbent Bike - 5 minutes; Seat @ 12; level 2.5 hills to improve lower extremity strength and cardiovascular endurance utilizing different speeds and resistances.   · Leg Press - 6 plates DL; sled at 4 3x10  Quad sets - 5 second holds x20  Straight Leg Raise - 3x10; 3# ankle weights  Clamshells - green TB; 3x10 bilateral  Knee extension stretch - low load, long duration; 3# 5 minutes  Hamstring stretch - long sitting; 45 second holds x3  Standing TKE - Blue TB 3x10    Manual Therapy Techniques: Joint mobilizations were applied to the: Left Knee for 10 minutes, including (bulleted exercises only):  · Left knee stretching for flexion and extension supine  · Left hyperextension mobilizations to improve extension  Left patella mobilizations in all planes    Patient Education and Home Exercises     Home Exercises Provided and Patient Education Provided     Education provided:   Symptom management and plan of care progressions.  Home Exercise Program.    Written Home Exercises Provided: Patient instructed to cont prior HEP. Exercises were reviewed and Herman was able to demonstrate them prior to the end of the session.  Herman demonstrated good  understanding of the education provided. See EMR under Patient Instructions for exercises provided during therapy sessions    ASSESSMENT   Patient tolerated treatment " well. Pt has been to 12 visits to date and made moderate progress towards goals. Pt with minimal change in knee ROM in recent visits. He does demonstrate good improvements in strength, however. He ambulates with improved gait pattern but continues to have L knee flexion contracture limiting TKE. Improved functional mobility noted with performance of squats, sit to stand, and walking short distances. He continues to have impairment with long distance walking. Pt demos good understanding of updated HEP without cuing required. He appears to have met therapeutic plateau and will be able to continue to work on strength and ROM independently at this time.  Answered all questions and answers and instructed pt to seek new referral from MD if he feels he is not progressing independently. He is discharged from PT at this time. Pt agrees with plan.     Herman is progressing well towards his goals. (met 4/8)  Pt prognosis is Good.     Pt's spiritual, cultural and educational needs considered and pt agreeable to plan of care and goals.     Anticipated barriers to physical therapy: Bony deformities    Goals:  Short Term Goals: 3-4 weeks   1. Patient will have reduced pain complaints from 6/10 to less than or equal to 4/10. (MET)  2. Patient will demonstrate increased AROM/PROM by approximately 25% to 50% of initial measurements. (Not Met - Progressing)  3. Patient will demonstrate increased muscle strength of at least one-half grade as compared to the initial measurements. (MET)     Long Term Goals: 6-8 weeks   1. Patient will have reduced pain complaints from 4/10 to less than or equal to 2/10. (MET)  2. Patient will demonstrate increased AROM/PROM by approximately 75% to 100% of initial measurements. (Not Met - Progressing)  3. Patient will demonstrate increased muscle strength of at least one grade as compared to the initial measurements. (Not Met - Progressing)  4. Patient will improve Knee FOTO limitation score from 47% to  less than or equal to 39%. (Not Met - Progressing)  5. Patient will be independent with their home exercise program. (MET)     Plan   Discharge to [facility]    GILMER MOORE, PT  1/24/2022

## 2022-02-03 DIAGNOSIS — N52.9 ERECTILE DYSFUNCTION, UNSPECIFIED ERECTILE DYSFUNCTION TYPE: ICD-10-CM

## 2022-02-03 NOTE — TELEPHONE ENCOUNTER
Care Due:                  Date            Visit Type   Department     Provider  --------------------------------------------------------------------------------                                EP -                              PRIMARY      [facility] INTERNAL  Last Visit: 05-      [facility] ([facility])   MEDICINE       Meng Peña  Next Visit: None Scheduled  None         None Found                                                            Last  Test          Frequency    Reason                     Performed    Due Date  --------------------------------------------------------------------------------    Office Visit  12 months..  pravastatin, tamsulosin..  05- 04-    Powered by [employer]. Reference number: [id number].   2/03/2022 5:59:54 AM CST

## 2022-02-09 DIAGNOSIS — N52.9 ERECTILE DYSFUNCTION, UNSPECIFIED ERECTILE DYSFUNCTION TYPE: ICD-10-CM

## 2022-02-09 RX ORDER — SILDENAFIL 100 MG/1
100 TABLET, FILM COATED ORAL DAILY PRN
Qty: 10 TABLET | Refills: 11 | Status: SHIPPED | OUTPATIENT
Start: 2022-02-09 | End: 2022-10-14

## 2022-02-09 NOTE — TELEPHONE ENCOUNTER
No new care gaps identified.  Powered by [employer] Reference number: [id number].   2/09/2022 11:06:46 AM CST

## 2022-02-14 RX ORDER — SILDENAFIL 100 MG/1
TABLET, FILM COATED ORAL
Qty: 10 TABLET | Refills: 11 | OUTPATIENT
Start: 2022-02-14

## 2022-02-14 NOTE — TELEPHONE ENCOUNTER
Provider Staff:     Action is required for this patient.   Please see care gap opportunities below in Care Due Message.     Thanks!  [facility]     Appointments      Date Provider   Last Visit   5/5/2021 Meng Peña MD   Next Visit   2/9/2022 Meng Peña MD     Note composed:10:36 AM 02/14/2022

## 2022-03-23 DIAGNOSIS — H40.1131 PRIMARY OPEN ANGLE GLAUCOMA OF BOTH EYES, MILD STAGE: ICD-10-CM

## 2022-03-23 RX ORDER — LATANOPROST 50 UG/ML
SOLUTION/ DROPS OPHTHALMIC
Qty: 2.5 ML | Refills: 12 | Status: SHIPPED | OUTPATIENT
Start: 2022-03-23 | End: 2023-04-11 | Stop reason: SDUPTHER

## 2022-05-02 ENCOUNTER — PATIENT OUTREACH (OUTPATIENT)
Dept: ADMINISTRATIVE | Facility: OTHER | Age: 72
End: 2022-05-02
Payer: MEDICARE

## 2022-05-02 ENCOUNTER — OFFICE VISIT (OUTPATIENT)
Dept: OPTOMETRY | Facility: CLINIC | Age: 72
End: 2022-05-02
Payer: MEDICARE

## 2022-05-02 DIAGNOSIS — H11.32 SUBCONJUNCTIVAL HEMORRHAGE OF LEFT EYE: Primary | ICD-10-CM

## 2022-05-02 DIAGNOSIS — H40.013 OPEN ANGLE WITH BORDERLINE FINDINGS, LOW RISK, BILATERAL: ICD-10-CM

## 2022-05-02 DIAGNOSIS — H25.13 NUCLEAR SCLEROSIS, BILATERAL: ICD-10-CM

## 2022-05-02 PROCEDURE — 99999 PR PBB SHADOW E&M-EST. PATIENT-LVL II: CPT | Mod: PBBFAC,,, | Performed by: OPTOMETRIST

## 2022-05-02 PROCEDURE — 99999 PR PBB SHADOW E&M-EST. PATIENT-LVL II: ICD-10-PCS | Mod: PBBFAC,,, | Performed by: OPTOMETRIST

## 2022-05-02 PROCEDURE — 92020 GONIOSCOPY: CPT | Mod: S$PBB,,, | Performed by: OPTOMETRIST

## 2022-05-02 PROCEDURE — 92014 PR EYE EXAM, EST PATIENT,COMPREHESV: ICD-10-PCS | Mod: S$PBB,,, | Performed by: OPTOMETRIST

## 2022-05-02 PROCEDURE — 99212 OFFICE O/P EST SF 10 MIN: CPT | Mod: PBBFAC | Performed by: OPTOMETRIST

## 2022-05-02 PROCEDURE — 92020 PR SPECIAL EYE EVAL,GONIOSCOPY: ICD-10-PCS | Mod: S$PBB,,, | Performed by: OPTOMETRIST

## 2022-05-02 PROCEDURE — 92020 GONIOSCOPY: CPT | Mod: PBBFAC | Performed by: OPTOMETRIST

## 2022-05-02 PROCEDURE — 92014 COMPRE OPH EXAM EST PT 1/>: CPT | Mod: S$PBB,,, | Performed by: OPTOMETRIST

## 2022-05-02 NOTE — PROGRESS NOTES
HPI     ERON: 10/1/2019 - Dr. Bradshaw    Presents today for eye irritation.   Pt reports redness, itching and burning.  Pt denies eye pain and vision changes.  Latanoprost QHS OU    Last edited by Calli Levin MA on 5/2/2022  1:49 PM. (History)        ROS     Positive for: Eyes (glauc susp)    Negative for: Constitutional, Gastrointestinal, Neurological, Skin,   Genitourinary, Musculoskeletal, HENT, Endocrine, Cardiovascular,   Respiratory, Psychiatric, Allergic/Imm, Heme/Lymph    Last edited by Duy Beckett, OD on 5/2/2022  2:00 PM. (History)        Assessment /Plan     For exam results, see Encounter Report.    Subconjunctival hemorrhage of left eye    Open angle with borderline findings, low risk, bilateral  -     Mederos Visual Field - OU - Extended - Both Eyes; Future; Expected date: 06/02/2022  -     Posterior Segment OCT Optic Nerve- Both eyes; Future; Expected date: 06/02/2022    Nuclear sclerosis, bilateral      1. Cat OU  2. Hx glauc tx by Dr Bradshaw--iop wnl on meds.  Large CDs OD>>OS.  Pach: 503/510.  Angles open by gonio.  Needs repeat VF/OCT  3. Pt presents TODAY for Subconjunctival Heme OS.  Without foreign body noted.  Pt reassurance given about resolution.  Advised ATs QID     PLAN:    1. Cont LATANOPROST qhs OU  2. rtc for HVF 24-2 sf/OCT.  appt w me after for iop ck/review--will REFRACT at that visit, and recheck pupils since CD OD>>OS

## 2022-05-10 ENCOUNTER — IMMUNIZATION (OUTPATIENT)
Dept: FAMILY MEDICINE | Facility: CLINIC | Age: 72
End: 2022-05-10
Payer: MEDICARE

## 2022-05-10 DIAGNOSIS — Z23 NEED FOR VACCINATION: Primary | ICD-10-CM

## 2022-05-10 PROCEDURE — 91305 COVID-19, MRNA, LNP-S, PF, 30 MCG/0.3 ML DOSE VACCINE (PFIZER): CPT | Mod: PBBFAC,PN

## 2022-06-09 ENCOUNTER — CLINICAL SUPPORT (OUTPATIENT)
Dept: OPHTHALMOLOGY | Facility: CLINIC | Age: 72
End: 2022-06-09
Payer: MEDICARE

## 2022-06-09 ENCOUNTER — OFFICE VISIT (OUTPATIENT)
Dept: OPTOMETRY | Facility: CLINIC | Age: 72
End: 2022-06-09
Payer: MEDICARE

## 2022-06-09 DIAGNOSIS — H40.013 OPEN ANGLE WITH BORDERLINE FINDINGS, LOW RISK, BILATERAL: ICD-10-CM

## 2022-06-09 PROCEDURE — 92083 EXTENDED VISUAL FIELD XM: CPT | Mod: PBBFAC | Performed by: OPTOMETRIST

## 2022-06-09 PROCEDURE — 99999 PR PBB SHADOW E&M-EST. PATIENT-LVL I: ICD-10-PCS | Mod: PBBFAC,,, | Performed by: OPTOMETRIST

## 2022-06-09 PROCEDURE — 92133 CPTRZD OPH DX IMG PST SGM ON: CPT | Mod: PBBFAC | Performed by: OPTOMETRIST

## 2022-06-09 PROCEDURE — 99211 OFF/OP EST MAY X REQ PHY/QHP: CPT | Mod: PBBFAC | Performed by: OPTOMETRIST

## 2022-06-09 PROCEDURE — 92083 HUMPHREY VISUAL FIELD - OU - BOTH EYES: ICD-10-PCS | Mod: 26,S$PBB,, | Performed by: OPTOMETRIST

## 2022-06-09 PROCEDURE — 92133 POSTERIOR SEGMENT OCT OPTIC NERVE(OCULAR COHERENCE TOMOGRAPHY) - OU - BOTH EYES: ICD-10-PCS | Mod: 26,S$PBB,, | Performed by: OPTOMETRIST

## 2022-06-09 PROCEDURE — 99999 PR PBB SHADOW E&M-EST. PATIENT-LVL I: CPT | Mod: PBBFAC,,, | Performed by: OPTOMETRIST

## 2022-06-09 PROCEDURE — 92012 PR EYE EXAM, EST PATIENT,INTERMED: ICD-10-PCS | Mod: S$PBB,,, | Performed by: OPTOMETRIST

## 2022-06-09 PROCEDURE — 92012 INTRM OPH EXAM EST PATIENT: CPT | Mod: S$PBB,,, | Performed by: OPTOMETRIST

## 2022-06-09 NOTE — PROGRESS NOTES
HPI     Eye Problem     Comments: Here for VF review/iop ck/refraction              Comments     Last exam 5/2/22          Last edited by Duy Beckett, OD on 6/9/2022  3:51 PM. (History)        ROS     Positive for: Eyes (glauc susp)    Negative for: Constitutional, Gastrointestinal, Neurological, Skin,   Genitourinary, Musculoskeletal, HENT, Endocrine, Cardiovascular,   Respiratory, Psychiatric, Allergic/Imm, Heme/Lymph    Last edited by Duy Beckett, OD on 6/9/2022  3:51 PM. (History)        Assessment /Plan     For exam results, see Encounter Report.    Open angle with borderline findings, low risk, bilateral  -     Posterior Segment OCT Optic Nerve- Both eyes  -     Mederos Visual Field - OU - Extended - Both Eyes      1. Cat OD>OS--pt wishes new Rx  2. Hx glauc tx by Dr Bradshaw--iop wnl on meds.  Large CDs OD>>OS--see OCT (below normal OD w red TI, yellow TS; RNFL wnl OS).  VF today wnl OU.   Pach: 503/510.  Angles open by gonio.        PLAN:    1. Cont LATANOPROST qhs OU  2. rtc 6 mos--iop ck.  Next HVF/OCT/full Summer 2023

## 2022-06-10 NOTE — PROGRESS NOTES
Hvf done ou. Rel/fix fair od poor os coop. Good ou./chart checked for latex allergy/ [place]/od [place]/os-smh

## 2022-07-05 ENCOUNTER — TELEPHONE (OUTPATIENT)
Dept: OPHTHALMOLOGY | Facility: CLINIC | Age: 72
End: 2022-07-05
Payer: MEDICARE

## 2022-07-05 NOTE — TELEPHONE ENCOUNTER
LVM returning pt. Call. CW ----- Message from Yelena Malin sent at 7/5/2022 11:17 AM CDT -----  Regarding: Pt called to speak to the nurse regarding his 1 pm appt today and would like a call back today asap  Patient Advice:    Pt called to speak to the nurse regarding his 1 pm appt today and would like a call back today asap    Pt can be reached at [phone]

## 2022-09-02 DIAGNOSIS — E78.2 MIXED HYPERLIPIDEMIA: ICD-10-CM

## 2022-09-05 RX ORDER — PRAVASTATIN SODIUM 80 MG/1
80 TABLET ORAL DAILY
Qty: 30 TABLET | Refills: 4 | Status: SHIPPED | OUTPATIENT
Start: 2022-09-05 | End: 2022-10-14

## 2022-10-14 ENCOUNTER — OFFICE VISIT (OUTPATIENT)
Dept: INTERNAL MEDICINE | Facility: CLINIC | Age: 72
End: 2022-10-14
Payer: MEDICARE

## 2022-10-14 ENCOUNTER — LAB VISIT (OUTPATIENT)
Dept: LAB | Facility: HOSPITAL | Age: 72
End: 2022-10-14
Attending: INTERNAL MEDICINE
Payer: MEDICARE

## 2022-10-14 VITALS
HEIGHT: 72 IN | SYSTOLIC BLOOD PRESSURE: 118 MMHG | HEART RATE: 70 BPM | BODY MASS INDEX: 27.17 KG/M2 | DIASTOLIC BLOOD PRESSURE: 60 MMHG | WEIGHT: 200.63 LBS | OXYGEN SATURATION: 98 %

## 2022-10-14 DIAGNOSIS — R73.9 HYPERGLYCEMIA: ICD-10-CM

## 2022-10-14 DIAGNOSIS — N52.9 ERECTILE DYSFUNCTION, UNSPECIFIED ERECTILE DYSFUNCTION TYPE: ICD-10-CM

## 2022-10-14 DIAGNOSIS — R73.9 HYPERGLYCEMIA: Primary | ICD-10-CM

## 2022-10-14 DIAGNOSIS — N40.1 BPH WITH URINARY OBSTRUCTION: ICD-10-CM

## 2022-10-14 DIAGNOSIS — N13.8 BPH WITH URINARY OBSTRUCTION: ICD-10-CM

## 2022-10-14 DIAGNOSIS — E78.2 MIXED HYPERLIPIDEMIA: ICD-10-CM

## 2022-10-14 DIAGNOSIS — M17.0 PRIMARY OSTEOARTHRITIS OF BOTH KNEES: ICD-10-CM

## 2022-10-14 LAB
ALBUMIN SERPL BCP-MCNC: 3.9 G/DL (ref 3.5–5.2)
ALP SERPL-CCNC: 49 U/L (ref 55–135)
ALT SERPL W/O P-5'-P-CCNC: 16 U/L (ref 10–44)
ANION GAP SERPL CALC-SCNC: 6 MMOL/L (ref 8–16)
AST SERPL-CCNC: 23 U/L (ref 10–40)
BASOPHILS # BLD AUTO: 0.05 K/UL (ref 0–0.2)
BASOPHILS NFR BLD: 0.8 % (ref 0–1.9)
BILIRUB SERPL-MCNC: 0.5 MG/DL (ref 0.1–1)
BUN SERPL-MCNC: 13 MG/DL (ref 8–23)
CALCIUM SERPL-MCNC: 9.2 MG/DL (ref 8.7–10.5)
CHLORIDE SERPL-SCNC: 105 MMOL/L (ref 95–110)
CHOLEST SERPL-MCNC: 171 MG/DL (ref 120–199)
CHOLEST/HDLC SERPL: 3.3 {RATIO} (ref 2–5)
CO2 SERPL-SCNC: 28 MMOL/L (ref 23–29)
CREAT SERPL-MCNC: 0.9 MG/DL (ref 0.5–1.4)
DIFFERENTIAL METHOD: ABNORMAL
EOSINOPHIL # BLD AUTO: 0.1 K/UL (ref 0–0.5)
EOSINOPHIL NFR BLD: 1.4 % (ref 0–8)
ERYTHROCYTE [DISTWIDTH] IN BLOOD BY AUTOMATED COUNT: 14.7 % (ref 11.5–14.5)
EST. GFR  (NO RACE VARIABLE): >60 ML/MIN/1.73 M^2
ESTIMATED AVG GLUCOSE: 117 MG/DL (ref 68–131)
GLUCOSE SERPL-MCNC: 91 MG/DL (ref 70–110)
HBA1C MFR BLD: 5.7 % (ref 4–5.6)
HCT VFR BLD AUTO: 43.3 % (ref 40–54)
HDLC SERPL-MCNC: 52 MG/DL (ref 40–75)
HDLC SERPL: 30.4 % (ref 20–50)
HGB BLD-MCNC: 14.3 G/DL (ref 14–18)
IMM GRANULOCYTES # BLD AUTO: 0.03 K/UL (ref 0–0.04)
IMM GRANULOCYTES NFR BLD AUTO: 0.5 % (ref 0–0.5)
LDLC SERPL CALC-MCNC: 105 MG/DL (ref 63–159)
LYMPHOCYTES # BLD AUTO: 1.6 K/UL (ref 1–4.8)
LYMPHOCYTES NFR BLD: 25.8 % (ref 18–48)
MCH RBC QN AUTO: 29.7 PG (ref 27–31)
MCHC RBC AUTO-ENTMCNC: 33 G/DL (ref 32–36)
MCV RBC AUTO: 90 FL (ref 82–98)
MONOCYTES # BLD AUTO: 0.7 K/UL (ref 0.3–1)
MONOCYTES NFR BLD: 10.3 % (ref 4–15)
NEUTROPHILS # BLD AUTO: 3.9 K/UL (ref 1.8–7.7)
NEUTROPHILS NFR BLD: 61.2 % (ref 38–73)
NONHDLC SERPL-MCNC: 119 MG/DL
NRBC BLD-RTO: 0 /100 WBC
PLATELET # BLD AUTO: 270 K/UL (ref 150–450)
PMV BLD AUTO: 10.3 FL (ref 9.2–12.9)
POTASSIUM SERPL-SCNC: 3.8 MMOL/L (ref 3.5–5.1)
PROT SERPL-MCNC: 7.4 G/DL (ref 6–8.4)
RBC # BLD AUTO: 4.82 M/UL (ref 4.6–6.2)
SODIUM SERPL-SCNC: 139 MMOL/L (ref 136–145)
TRIGL SERPL-MCNC: 70 MG/DL (ref 30–150)
WBC # BLD AUTO: 6.33 K/UL (ref 3.9–12.7)

## 2022-10-14 PROCEDURE — 36415 COLL VENOUS BLD VENIPUNCTURE: CPT | Performed by: INTERNAL MEDICINE

## 2022-10-14 PROCEDURE — G0008 ADMIN INFLUENZA VIRUS VAC: HCPCS | Mod: PBBFAC

## 2022-10-14 PROCEDURE — 99999 PR PBB SHADOW E&M-EST. PATIENT-LVL IV: CPT | Mod: PBBFAC,,, | Performed by: INTERNAL MEDICINE

## 2022-10-14 PROCEDURE — 99214 OFFICE O/P EST MOD 30 MIN: CPT | Mod: S$PBB,,, | Performed by: INTERNAL MEDICINE

## 2022-10-14 PROCEDURE — 83036 HEMOGLOBIN GLYCOSYLATED A1C: CPT | Performed by: INTERNAL MEDICINE

## 2022-10-14 PROCEDURE — 85025 COMPLETE CBC W/AUTO DIFF WBC: CPT | Performed by: INTERNAL MEDICINE

## 2022-10-14 PROCEDURE — 99214 OFFICE O/P EST MOD 30 MIN: CPT | Mod: PBBFAC,25 | Performed by: INTERNAL MEDICINE

## 2022-10-14 PROCEDURE — 99214 PR OFFICE/OUTPT VISIT, EST, LEVL IV, 30-39 MIN: ICD-10-PCS | Mod: S$PBB,,, | Performed by: INTERNAL MEDICINE

## 2022-10-14 PROCEDURE — 80061 LIPID PANEL: CPT | Performed by: INTERNAL MEDICINE

## 2022-10-14 PROCEDURE — 80053 COMPREHEN METABOLIC PANEL: CPT | Performed by: INTERNAL MEDICINE

## 2022-10-14 PROCEDURE — 99999 PR PBB SHADOW E&M-EST. PATIENT-LVL IV: ICD-10-PCS | Mod: PBBFAC,,, | Performed by: INTERNAL MEDICINE

## 2022-10-14 RX ORDER — PRAVASTATIN SODIUM 80 MG/1
80 TABLET ORAL DAILY
Qty: 90 TABLET | Refills: 11 | Status: SHIPPED | OUTPATIENT
Start: 2022-10-14 | End: 2023-02-06

## 2022-10-14 RX ORDER — SILDENAFIL 100 MG/1
100 TABLET, FILM COATED ORAL DAILY PRN
Qty: 10 TABLET | Refills: 11 | Status: SHIPPED | OUTPATIENT
Start: 2022-10-14 | End: 2023-10-15

## 2022-10-14 RX ORDER — TAMSULOSIN HYDROCHLORIDE 0.4 MG/1
0.4 CAPSULE ORAL DAILY
Qty: 90 CAPSULE | Refills: 11 | Status: SHIPPED | OUTPATIENT
Start: 2022-10-14 | End: 2022-12-23 | Stop reason: SDUPTHER

## 2022-10-14 NOTE — PROGRESS NOTES
Subjective:       Patient ID: Herman Chirinos is a 72 y.o. male.    Chief Complaint: [facility] Care    Patient is here for followup for chronic conditions/est care.    Mild pain lower back, pain 3/10, no injury.   Pt denies f/c/ns/wt loss, no fecal incontinence or difficulty with retained urine, no hematuria, no dysuria, no perianal anesthesia, no focal weakness or loss of sensation in feet or legs.      Chronic L knee arthritis pains, he limps at times.    Review of Systems   Constitutional:  Negative for appetite change and unexpected weight change.   Respiratory:  Negative for chest tightness and shortness of breath.    Cardiovascular:  Negative for chest pain.   Gastrointestinal:  Negative for abdominal pain.   Genitourinary:  Negative for difficulty urinating, scrotal swelling and testicular pain.   Musculoskeletal:  Positive for arthralgias (knee pain L).   Skin:         No lesions         Objective:      Physical Exam  Vitals reviewed.   Constitutional:       General: He is not in acute distress.     Appearance: Normal appearance. He is well-developed. He is not ill-appearing, toxic-appearing or diaphoretic.   HENT:      Head: Normocephalic and atraumatic.   Eyes:      General: No scleral icterus.  Neck:      Thyroid: No thyromegaly.   Cardiovascular:      Rate and Rhythm: Normal rate and regular rhythm.      Heart sounds: Normal heart sounds. No murmur heard.    No friction rub. No gallop.   Pulmonary:      Effort: Pulmonary effort is normal. No respiratory distress.      Breath sounds: Normal breath sounds. No wheezing or rales.   Abdominal:      General: Bowel sounds are normal. There is no distension.      Palpations: Abdomen is soft. There is no mass.      Tenderness: There is no abdominal tenderness. There is no guarding or rebound.   Musculoskeletal:         General: Normal range of motion.      Cervical back: Normal range of motion.      Comments: L knee crepitus but rom is intact    No midline  spine tenderness to deep palpation     Lymphadenopathy:      Cervical: No cervical adenopathy.   Skin:     Findings: No lesion.   Neurological:      Mental Status: He is alert and oriented to person, place, and time.   Psychiatric:         Mood and Affect: Mood normal.         Behavior: Behavior normal.         Thought Content: Thought content normal.       Assessment:       1. Hyperglycemia    2. BPH with urinary obstruction    3. Mixed hyperlipidemia    4. Erectile dysfunction, unspecified erectile dysfunction type    5. Primary osteoarthritis of both knees        Plan:       Herman was seen today for establish care.    Diagnoses and all orders for this visit:    Hyperglycemia  -     Hemoglobin A1C; Future    BPH with urinary obstruction  -     tamsulosin (FLOMAX) 0.4 mg Cap; Take 1 capsule (0.4 mg total) by mouth once daily.  -     CBC Auto Differential; Future  Symptoms stable    Mixed hyperlipidemia  -     pravastatin (PRAVACHOL) 80 MG tablet; Take 1 tablet (80 mg total) by mouth once daily.  -     CBC Auto Differential; Future  -     Comprehensive Metabolic Panel; Future  -     Lipid Panel; Future    Erectile dysfunction, unspecified erectile dysfunction type  -     sildenafiL (VIAGRA) 100 MG tablet; Take 1 tablet (100 mg total) by mouth daily as needed for Erectile Dysfunction.    Primary osteoarthritis of both knees  -     Ambulatory referral/consult to Orthopedics; Future    Other orders  -     Influenza (FLUAD) - Quadrivalent (Adjuvanted) *Preferred* (65+) (PF)    Health Maintenance         Date Due Completion Date    COVID-19 Vaccine (5 - Booster for Pfizer series) 07/05/2022 5/10/2022    Lipid Panel 10/14/2023 10/14/2022    Colorectal Cancer Screening 07/08/2026 7/8/2021    Override on 7/27/2007: Done    TETANUS VACCINE 05/24/2028 5/24/2018                Follow up in about 1 year (around 10/14/2023).    Future Appointments   Date Time Provider Department Center   10/26/2022  1:30 PM Debby Javier,  MD [facility]   11/8/2022  3:00 PM PFIZER V[facility]

## 2022-10-14 NOTE — PROGRESS NOTES
Two patient Identifier confirmed. (Name and ) Patient tolerated shot well. Patient was instructed to wait 15 minutes after injection.

## 2022-10-18 ENCOUNTER — OFFICE VISIT (OUTPATIENT)
Dept: ORTHOPEDICS | Facility: CLINIC | Age: 72
End: 2022-10-18
Payer: MEDICARE

## 2022-10-18 ENCOUNTER — HOSPITAL ENCOUNTER (OUTPATIENT)
Dept: RADIOLOGY | Facility: HOSPITAL | Age: 72
Discharge: HOME | End: 2022-10-18
Attending: ORTHOPAEDIC SURGERY
Payer: MEDICARE

## 2022-10-18 VITALS — HEIGHT: 72 IN | BODY MASS INDEX: 27.37 KG/M2 | WEIGHT: 202.06 LBS

## 2022-10-18 DIAGNOSIS — M17.0 PRIMARY OSTEOARTHRITIS OF BOTH KNEES: ICD-10-CM

## 2022-10-18 DIAGNOSIS — M17.12 PRIMARY OSTEOARTHRITIS OF LEFT KNEE: Primary | ICD-10-CM

## 2022-10-18 PROCEDURE — 99999 PR PBB SHADOW E&M-EST. PATIENT-LVL III: CPT | Mod: PBBFAC,,, | Performed by: ORTHOPAEDIC SURGERY

## 2022-10-18 PROCEDURE — 99213 PR OFFICE/OUTPT VISIT, EST, LEVL III, 20-29 MIN: ICD-10-PCS | Mod: S$PBB,,, | Performed by: ORTHOPAEDIC SURGERY

## 2022-10-18 PROCEDURE — 73564 X-RAY EXAM KNEE 4 OR MORE: CPT | Mod: 26,50,, | Performed by: RADIOLOGY

## 2022-10-18 PROCEDURE — 99213 OFFICE O/P EST LOW 20 MIN: CPT | Mod: PBBFAC | Performed by: ORTHOPAEDIC SURGERY

## 2022-10-18 PROCEDURE — 99213 OFFICE O/P EST LOW 20 MIN: CPT | Mod: S$PBB,,, | Performed by: ORTHOPAEDIC SURGERY

## 2022-10-18 PROCEDURE — 99999 PR PBB SHADOW E&M-EST. PATIENT-LVL III: ICD-10-PCS | Mod: PBBFAC,,, | Performed by: ORTHOPAEDIC SURGERY

## 2022-10-18 PROCEDURE — 73564 XR KNEE ORTHO BILAT WITH FLEXION: ICD-10-PCS | Mod: 26,50,, | Performed by: RADIOLOGY

## 2022-10-18 PROCEDURE — 73564 X-RAY EXAM KNEE 4 OR MORE: CPT | Mod: TC,50

## 2022-10-23 NOTE — PROGRESS NOTES
Subjective:      Patient ID: Herman Chirinos is a 72 y.o. male.    Chief Complaint:   Pain of the Left Knee and Pain of the Right Knee    HPI  72 year old male presents with chief complaint of left knee pain, much worse than right. No trauma. He has known advanced osteoarthritis. He has difficulty going from sit to stand and he reports stiffness. He does limp. He also reports swelling. He does not use assistive devices. He has had cortisone injections that worked in the past, but the most recent 1 did not.  He has started to think about whether he will have knee replacement, which has been discussed with him before.  No groin pain, distal numbness or tingling..       Objective:        Ortho/SPM Exam    There is is a mild varus deformity, which is partially passively correctable.  Active range of motion is 5-115 degrees on the right, and 5-90 degrees on the left.  Anterior crepitus with active extension.  Patellar mobility is limited.  Boggy synovitis without effusion.  Medial joint line tenderness predominates.  No instability to varus/valgus/Lachman's stressing.  No pain in the groin with flexion and internal rotation of the hip which is not limited.  Skin intact.  Distal neurovascular intact.  Flip test negative.        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  No acute findings or suspicious bone defects.  Assessment:   Advanced DJD, left knee      Plan:   Before proceeding to joint replacement, he would like to be referred to the Pain Management Service to see their recommendations and interventions.  I told him that if they could get him some relief I would like to see him in physical therapy attempting to improve his flexion and quad strength prior to doing surgery.  He will discuss the timing of surgery with his family    The patient's pathophysiology was explained in detail with reference to x-rays, models, other visual  aids as appropriate.  Treatment options were discussed in detail.  Questions were invited and answered to the patient's satisfaction.      Otf Bowles MD  Orthopedic Surgery

## 2022-10-25 ENCOUNTER — TELEPHONE (OUTPATIENT)
Dept: SPINE | Facility: CLINIC | Age: 72
End: 2022-10-25
Payer: MEDICARE

## 2022-10-25 NOTE — TELEPHONE ENCOUNTER
This message is for patient in regards to his/her appointment 10/26/22 at 1:30p   With  Debby Javier MD.        [facility] Policy: For the safety of all patients and staff members.   During this visit we're informing all patients and visitors to wear face mask at all time here at [facility].  If you have any questions or concerns please contact [phone]       also inquired IPM within message.    [facility] providers and Mid-levels offer interventional, procedure--based options to treat chronic pain. The goal is to manage chronic pain by reducing pain frequency and intensity and address your functional goals for activities of daily living while simultaneously reducing or eliminating your reliance on medications. Please bring any records or images that you have from prior treatments for your pain. You will be presented with multi-modal treatment plan that may or may not include imaging, interventional procedures, physical/occupational/aqua therapy, pain creams, and non-narcotic pain medications used for the treatments of chronic pain.       Staff was unable to leave voicemail reminding pt of appt due phone just ringing

## 2022-10-26 ENCOUNTER — OFFICE VISIT (OUTPATIENT)
Dept: PAIN MEDICINE | Facility: CLINIC | Age: 72
End: 2022-10-26
Payer: MEDICARE

## 2022-10-26 VITALS
DIASTOLIC BLOOD PRESSURE: 72 MMHG | SYSTOLIC BLOOD PRESSURE: 106 MMHG | HEART RATE: 65 BPM | HEIGHT: 72 IN | WEIGHT: 200.63 LBS | BODY MASS INDEX: 27.17 KG/M2

## 2022-10-26 DIAGNOSIS — M17.12 PRIMARY OSTEOARTHRITIS OF LEFT KNEE: Primary | ICD-10-CM

## 2022-10-26 PROCEDURE — 99213 OFFICE O/P EST LOW 20 MIN: CPT | Mod: PBBFAC | Performed by: ANESTHESIOLOGY

## 2022-10-26 PROCEDURE — 99204 OFFICE O/P NEW MOD 45 MIN: CPT | Mod: S$PBB,,, | Performed by: ANESTHESIOLOGY

## 2022-10-26 PROCEDURE — 99999 PR PBB SHADOW E&M-EST. PATIENT-LVL III: CPT | Mod: PBBFAC,,, | Performed by: ANESTHESIOLOGY

## 2022-10-26 PROCEDURE — 99204 PR OFFICE/OUTPT VISIT, NEW, LEVL IV, 45-59 MIN: ICD-10-PCS | Mod: S$PBB,,, | Performed by: ANESTHESIOLOGY

## 2022-10-26 PROCEDURE — 99999 PR PBB SHADOW E&M-EST. PATIENT-LVL III: ICD-10-PCS | Mod: PBBFAC,,, | Performed by: ANESTHESIOLOGY

## 2022-10-26 NOTE — H&P (VIEW-ONLY)
PCP: Avila Toledo MD    REFERRING PHYSICIAN: Otf Bowles MD    CHIEF COMPLAINT: Bilateral Knee Pain Left > Right    Original HISTORY OF PRESENT ILLNESS: Herman Chirinos presents to the clinic for the evaluation of the above pain. The pain started years ago and has been progressively worse.     Original Pain Description:  The pain is located in the bilateral knees and the left is much worse than the right. It does not radiate. The pain is described as aching, dull, and stabbing. Exacerbating factors: Walking, Getting out of bed/chair, and stumbling. Mitigating factors medications, physical therapy, and rest. Symptoms interfere with daily activity and falling asleep. The patient feels like symptoms have been worsening. Patient denies urinary incontinence, bowel incontinence, significant weight loss, significant motor weakness, and loss of sensations.    Original PAIN SCORES:  Best: Pain is 3  Worst: Pain is 10  Usually: Pain is 5  Current: Pain is 3    INTERVAL HISTORY:     6 weeks of Conservative therapy (PT/Chiro/Home Exercises with Dates)  PT - March 2022 - helped ROM and strength     Treatments / Medications: (Ice/Heat/NSAIDS/APAP/etc):  Aleve - occasionally  Knee Sleeve      Report:  Consistent and Appropriate with report.    Interventional Pain Procedures: (Previous injections)  2 CSI in Left Knee over the past few years with decreasing efficacy.     Past Medical History:   Diagnosis Date    Appendicitis     Benign neoplasm of colon     Cataract     Chronic rhinitis     Colon polyps     Glaucoma     Hyperlipidemia     IFG (impaired fasting glucose) 1/8/2016    Lumbar stenosis     Osteoarthritis of both knees 5/24/2018    Tubular adenoma of colon: 4/16 repeat 2021 4/7/2017     Past Surgical History:   Procedure Laterality Date    APPENDECTOMY      COLONOSCOPY  2007    repeat recommended in five years    COLONOSCOPY N/A 4/7/2016    Procedure: COLONOSCOPY;  Surgeon: Luis Yusuf MD;  Location:  [facility] (4TH FLR);  Service: Endoscopy;  Laterality: N/A;  last procedure with Janell, patient requesting very early appt    COLONOSCOPY N/A 7/8/2021    Procedure: COLONOSCOPY;  Surgeon: Austin Pettit MD;  Location: [facility] (4TH FLR);  Service: Endoscopy;  Laterality: N/A;  Fully vacc Covid-19 - pg     Social History     Socioeconomic History    Marital status:     Number of children: 6   Occupational History    Occupation: [employer]     Employer: [employer]     Comment: [employer]   Tobacco Use    Smoking status: Never    Smokeless tobacco: Never   Substance and Sexual Activity    Alcohol use: No    Drug use: No    Sexual activity: Yes     Partners: Female   Social History Narrative     2020, lives alone. 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 Has a [employer] life. Active with yard work, walks. Prev work as  at [employer].     Family History   Problem Relation Age of Onset    Coronary artery disease Mother     Hypertension Mother     Heart disease Mother         heart transplant    Diabetes Mellitus Father     Diabetes Father     Kidney disease Sister     No Known Problems Sister     Kidney disease Brother     Cancer Brother         bladder ca    No Known Problems Brother     Cancer Daughter         br ca, CR    No Known Problems Daughter     No Known Problems Daughter     No Known Problems Son     No Known Problems Son     No Known Problems Son     No Known Problems Maternal Aunt     No Known Problems Maternal Uncle     No Known Problems Paternal Aunt     No Known Problems Paternal Uncle     No Known Problems Maternal Grandmother     No Known Problems Maternal Grandfather     No Known Problems Paternal Grandmother     No Known Problems Paternal Grandfather     Stroke Neg Hx     Glaucoma Neg Hx     Cataracts Neg Hx     Blindness Neg Hx     Amblyopia Neg Hx     Macular degeneration Neg Hx     Retinal detachment Neg Hx     Strabismus Neg Hx     Thyroid disease Neg Hx     Psoriasis Neg Hx      Tremor Neg Hx     Parkinsonism Neg Hx        Review of patient's allergies indicates:  No Known Allergies    Current Outpatient Medications   Medication Sig    aspirin 81 MG Chew Take 1 tablet (81 mg total) by mouth once daily.    cholecalciferol, vitamin D3, (VITAMIN D3) 25 mcg (1,000 unit) capsule Take 1 capsule (1,000 Units total) by mouth once daily.    latanoprost 0.005 % ophthalmic solution INSTILL 1 DROP IN BOTH EYES EVERY EVENING    loratadine (CLARITIN) 10 mg tablet Take 1 tablet (10 mg total) by mouth once daily.    pravastatin (PRAVACHOL) 80 MG tablet Take 1 tablet (80 mg total) by mouth once daily.    sildenafiL (VIAGRA) 100 MG tablet Take 1 tablet (100 mg total) by mouth daily as needed for Erectile Dysfunction.    tamsulosin (FLOMAX) 0.4 mg Cap Take 1 capsule (0.4 mg total) by mouth once daily.     No current facility-administered medications for this visit.       ROS:  GENERAL: No fever. No chills. No fatigue. Denies weight loss. Denies weight gain.  HEENT: Denies headaches. Denies vision change. Denies eye pain. Denies double vision. Denies ear pain.   CV: Denies chest pain.   PULM: Denies of shortness of breath.  GI: Denies constipation. No diarrhea. No abdominal pain. Denies nausea. Denies vomiting. No blood in stool.  HEME: Denies bleeding problems.  : Denies urgency. No painful urination. No blood in urine.  MS: B/l Knee Pain.  SKIN: Denies rash.   NEURO: Denies seizures. No weakness.  PSYCH:  Denies difficulty sleeping. No anxiety. Denies depression. No suicidal thoughts.       VITALS:   Vitals:    10/26/22 1314   BP: 106/72   Pulse: 65   Weight: 91 kg (200 lb 9.9 oz)   Height: 6' (1.829 m)   PainSc:   3         PHYSICAL EXAM:   GENERAL: Well appearing, in no acute distress, alert and oriented x3.  PSYCH:  Mood and affect appropriate.  SKIN: Skin color, texture, turgor normal, no rashes or lesions.  HEENT:  Normocephalic, atraumatic. Cranial nerves grossly intact.  PULM: No evidence of  respiratory difficulty, symmetric chest rise.  GI:  Non-distended  BACK: Normal range of motion.  EXTREMITIES: Mild swelling noted in left knee.   MUSCULOSKELETAL: Medial joint line pain in left knee. Crepitus in b/l knees. TTP to superior and lateral patella in left knee. Decreased ROM in BL knees L>R. Left knee 10-95 w/ significant pain at flexion end range. Right knee 0-130. No laxity or instability noted bilaterally. Hip provocative maneuvers are negative. Bilateral lower extremity strength is normal and symmetric. No atrophy is noted.  NEURO: Sensation is equal and appropriate bilaterally. Bilateral lower extremity coordination and muscle stretch reflexes are physiologic and symmetric. Plantar response are downgoing.   GAIT: Antalgic but independent w/o assistive device.    LABS:  Reviewed in Epic:  Recent A1c 5.7  GFR >60   H/H wnl.    IMAGING:    Bilateral Knee Xrs 10/18/22  Bilateral tricompartmental osteoarthritis with severe medial joint space narrowing L>R.       ASSESSMENT: 72 y.o. year old male with bilateral knee pain, consistent with primary osteoarthritis of the knees.    DISCUSSION: Mr. Sutton is a pleasant gentleman who is referred by Dr. Bowles for left knee pain. His long-term plan is to have the knee replaced. He would like to increase his strength and ROM prior to surgery but has been unable to fully participate with PT due to his pain.    PLAN:  Schedule patient for Left Genicular Nerve Block with progression to RFA if indicated.   Would recommend PT following RFA if successful to increase strength and ROM in the LLE.   Will follow up after RFA and make a PT referral if possible    I would like to thank Otf Bowles MD for the opportunity to assist in the care of this patient. We had a very nice visit and I look forward to continuing their care. Please let me know if I can be of further assistance.     William Padilla  10/26/2022

## 2022-10-26 NOTE — PROGRESS NOTES
PCP: Avila Toledo MD    REFERRING PHYSICIAN: Otf Bowles MD    CHIEF COMPLAINT: Bilateral Knee Pain Left > Right    Original HISTORY OF PRESENT ILLNESS: Herman Chirinos presents to the clinic for the evaluation of the above pain. The pain started years ago and has been progressively worse.     Original Pain Description:  The pain is located in the bilateral knees and the left is much worse than the right. It does not radiate. The pain is described as aching, dull, and stabbing. Exacerbating factors: Walking, Getting out of bed/chair, and stumbling. Mitigating factors medications, physical therapy, and rest. Symptoms interfere with daily activity and falling asleep. The patient feels like symptoms have been worsening. Patient denies urinary incontinence, bowel incontinence, significant weight loss, significant motor weakness, and loss of sensations.    Original PAIN SCORES:  Best: Pain is 3  Worst: Pain is 10  Usually: Pain is 5  Current: Pain is 3    INTERVAL HISTORY:     6 weeks of Conservative therapy (PT/Chiro/Home Exercises with Dates)  PT - March 2022 - helped ROM and strength     Treatments / Medications: (Ice/Heat/NSAIDS/APAP/etc):  Aleve - occasionally  Knee Sleeve      Report:  Consistent and Appropriate with report.    Interventional Pain Procedures: (Previous injections)  2 CSI in Left Knee over the past few years with decreasing efficacy.     Past Medical History:   Diagnosis Date    Appendicitis     Benign neoplasm of colon     Cataract     Chronic rhinitis     Colon polyps     Glaucoma     Hyperlipidemia     IFG (impaired fasting glucose) 1/8/2016    Lumbar stenosis     Osteoarthritis of both knees 5/24/2018    Tubular adenoma of colon: 4/16 repeat 2021 4/7/2017     Past Surgical History:   Procedure Laterality Date    APPENDECTOMY      COLONOSCOPY  2007    repeat recommended in five years    COLONOSCOPY N/A 4/7/2016    Procedure: COLONOSCOPY;  Surgeon: Luis Yusuf MD;  Location:  [facility] (4TH FLR);  Service: Endoscopy;  Laterality: N/A;  last procedure with Janell, patient requesting very early appt    COLONOSCOPY N/A 7/8/2021    Procedure: COLONOSCOPY;  Surgeon: Austin Pettit MD;  Location: [facility] (4TH FLR);  Service: Endoscopy;  Laterality: N/A;  Fully vacc Covid-19 - pg     Social History     Socioeconomic History    Marital status:     Number of children: 6   Occupational History    Occupation: [employer]     Employer: [employer]     Comment: [employer]   Tobacco Use    Smoking status: Never    Smokeless tobacco: Never   Substance and Sexual Activity    Alcohol use: No    Drug use: No    Sexual activity: Yes     Partners: Female   Social History Narrative     2020, lives alone. 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 Has a [employer] life. Active with yard work, walks. Prev work as  at [employer].     Family History   Problem Relation Age of Onset    Coronary artery disease Mother     Hypertension Mother     Heart disease Mother         heart transplant    Diabetes Mellitus Father     Diabetes Father     Kidney disease Sister     No Known Problems Sister     Kidney disease Brother     Cancer Brother         bladder ca    No Known Problems Brother     Cancer Daughter         br ca, CR    No Known Problems Daughter     No Known Problems Daughter     No Known Problems Son     No Known Problems Son     No Known Problems Son     No Known Problems Maternal Aunt     No Known Problems Maternal Uncle     No Known Problems Paternal Aunt     No Known Problems Paternal Uncle     No Known Problems Maternal Grandmother     No Known Problems Maternal Grandfather     No Known Problems Paternal Grandmother     No Known Problems Paternal Grandfather     Stroke Neg Hx     Glaucoma Neg Hx     Cataracts Neg Hx     Blindness Neg Hx     Amblyopia Neg Hx     Macular degeneration Neg Hx     Retinal detachment Neg Hx     Strabismus Neg Hx     Thyroid disease Neg Hx     Psoriasis Neg Hx      Tremor Neg Hx     Parkinsonism Neg Hx        Review of patient's allergies indicates:  No Known Allergies    Current Outpatient Medications   Medication Sig    aspirin 81 MG Chew Take 1 tablet (81 mg total) by mouth once daily.    cholecalciferol, vitamin D3, (VITAMIN D3) 25 mcg (1,000 unit) capsule Take 1 capsule (1,000 Units total) by mouth once daily.    latanoprost 0.005 % ophthalmic solution INSTILL 1 DROP IN BOTH EYES EVERY EVENING    loratadine (CLARITIN) 10 mg tablet Take 1 tablet (10 mg total) by mouth once daily.    pravastatin (PRAVACHOL) 80 MG tablet Take 1 tablet (80 mg total) by mouth once daily.    sildenafiL (VIAGRA) 100 MG tablet Take 1 tablet (100 mg total) by mouth daily as needed for Erectile Dysfunction.    tamsulosin (FLOMAX) 0.4 mg Cap Take 1 capsule (0.4 mg total) by mouth once daily.     No current facility-administered medications for this visit.       ROS:  GENERAL: No fever. No chills. No fatigue. Denies weight loss. Denies weight gain.  HEENT: Denies headaches. Denies vision change. Denies eye pain. Denies double vision. Denies ear pain.   CV: Denies chest pain.   PULM: Denies of shortness of breath.  GI: Denies constipation. No diarrhea. No abdominal pain. Denies nausea. Denies vomiting. No blood in stool.  HEME: Denies bleeding problems.  : Denies urgency. No painful urination. No blood in urine.  MS: B/l Knee Pain.  SKIN: Denies rash.   NEURO: Denies seizures. No weakness.  PSYCH:  Denies difficulty sleeping. No anxiety. Denies depression. No suicidal thoughts.       VITALS:   Vitals:    10/26/22 1314   BP: 106/72   Pulse: 65   Weight: 91 kg (200 lb 9.9 oz)   Height: 6' (1.829 m)   PainSc:   3         PHYSICAL EXAM:   GENERAL: Well appearing, in no acute distress, alert and oriented x3.  PSYCH:  Mood and affect appropriate.  SKIN: Skin color, texture, turgor normal, no rashes or lesions.  HEENT:  Normocephalic, atraumatic. Cranial nerves grossly intact.  PULM: No evidence of  respiratory difficulty, symmetric chest rise.  GI:  Non-distended  BACK: Normal range of motion.  EXTREMITIES: Mild swelling noted in left knee.   MUSCULOSKELETAL: Medial joint line pain in left knee. Crepitus in b/l knees. TTP to superior and lateral patella in left knee. Decreased ROM in BL knees L>R. Left knee 10-95 w/ significant pain at flexion end range. Right knee 0-130. No laxity or instability noted bilaterally. Hip provocative maneuvers are negative. Bilateral lower extremity strength is normal and symmetric. No atrophy is noted.  NEURO: Sensation is equal and appropriate bilaterally. Bilateral lower extremity coordination and muscle stretch reflexes are physiologic and symmetric. Plantar response are downgoing.   GAIT: Antalgic but independent w/o assistive device.    LABS:  Reviewed in Epic:  Recent A1c 5.7  GFR >60   H/H wnl.    IMAGING:    Bilateral Knee Xrs 10/18/22  Bilateral tricompartmental osteoarthritis with severe medial joint space narrowing L>R.       ASSESSMENT: 72 y.o. year old male with bilateral knee pain, consistent with primary osteoarthritis of the knees.    DISCUSSION: Mr. Sutton is a pleasant gentleman who is referred by Dr. Bowles for left knee pain. His long-term plan is to have the knee replaced. He would like to increase his strength and ROM prior to surgery but has been unable to fully participate with PT due to his pain.    PLAN:  Schedule patient for Left Genicular Nerve Block with progression to RFA if indicated.   Would recommend PT following RFA if successful to increase strength and ROM in the LLE.   Will follow up after RFA and make a PT referral if possible    I would like to thank Otf Bowles MD for the opportunity to assist in the care of this patient. We had a very nice visit and I look forward to continuing their care. Please let me know if I can be of further assistance.     William Padilla  10/26/2022

## 2022-11-08 ENCOUNTER — IMMUNIZATION (OUTPATIENT)
Dept: FAMILY MEDICINE | Facility: CLINIC | Age: 72
End: 2022-11-08
Payer: MEDICARE

## 2022-11-08 DIAGNOSIS — Z23 NEED FOR VACCINATION: Primary | ICD-10-CM

## 2022-11-08 PROCEDURE — 0124A COVID-19, MRNA, LNP-S, BIVALENT BOOSTER, PF, 30 MCG/0.3 ML DOSE: CPT | Mod: PBBFAC,PN

## 2022-11-08 PROCEDURE — 91312 COVID-19, MRNA, LNP-S, BIVALENT BOOSTER, PF, 30 MCG/0.3 ML DOSE: CPT | Mod: PBBFAC,PN

## 2022-11-22 ENCOUNTER — HOSPITAL ENCOUNTER (OUTPATIENT)
Facility: OTHER | Age: 72
Discharge: HOME | End: 2022-11-22
Attending: ANESTHESIOLOGY | Admitting: ANESTHESIOLOGY
Payer: MEDICARE

## 2022-11-22 VITALS
BODY MASS INDEX: 28.71 KG/M2 | HEART RATE: 72 BPM | SYSTOLIC BLOOD PRESSURE: 144 MMHG | DIASTOLIC BLOOD PRESSURE: 78 MMHG | OXYGEN SATURATION: 100 % | HEIGHT: 72 IN | WEIGHT: 212 LBS | RESPIRATION RATE: 14 BRPM

## 2022-11-22 DIAGNOSIS — M17.12 PRIMARY OSTEOARTHRITIS OF LEFT KNEE: ICD-10-CM

## 2022-11-22 DIAGNOSIS — M17.0 PRIMARY OSTEOARTHRITIS OF BOTH KNEES: Primary | ICD-10-CM

## 2022-11-22 DIAGNOSIS — G89.29 CHRONIC PAIN: ICD-10-CM

## 2022-11-22 PROCEDURE — 64454 NJX AA&/STRD GNCLR NRV BRNCH: CPT | Mod: KX,LT,, | Performed by: ANESTHESIOLOGY

## 2022-11-22 PROCEDURE — 64454 NJX AA&/STRD GNCLR NRV BRNCH: CPT | Mod: KX,LT | Performed by: ANESTHESIOLOGY

## 2022-11-22 PROCEDURE — 64454 PR NERVE BLOCK INJ, ANES/STEROID, GENICULAR NERVE, W/IMG: ICD-10-PCS | Mod: KX,LT,, | Performed by: ANESTHESIOLOGY

## 2022-11-22 PROCEDURE — 25000003 PHARM REV CODE 250: Performed by: ANESTHESIOLOGY

## 2022-11-22 RX ORDER — LIDOCAINE HYDROCHLORIDE 20 MG/ML
INJECTION, SOLUTION INFILTRATION; PERINEURAL
Status: DISCONTINUED | OUTPATIENT
Start: 2022-11-22 | End: 2022-11-22 | Stop reason: HOSPADM

## 2022-11-22 RX ORDER — SODIUM CHLORIDE 9 MG/ML
500 INJECTION, SOLUTION INTRAVENOUS CONTINUOUS
Status: DISCONTINUED | OUTPATIENT
Start: 2022-11-22 | End: 2022-11-22 | Stop reason: HOSPADM

## 2022-11-22 RX ORDER — BUPIVACAINE HYDROCHLORIDE 2.5 MG/ML
INJECTION, SOLUTION EPIDURAL; INFILTRATION; INTRACAUDAL
Status: DISCONTINUED | OUTPATIENT
Start: 2022-11-22 | End: 2022-11-22 | Stop reason: HOSPADM

## 2022-11-22 NOTE — DISCHARGE SUMMARY
Discharge Note  Short Stay      SUMMARY     Admit Date: 11/22/2022    Attending Physician: Debby Javier      Discharge Physician: Debby Javier      Discharge Date: 11/22/2022 10:15 AM    Procedure(s) (LRB):  BLOCK, NERVE, GENICULAR, left (Left)    Final Diagnosis: Primary osteoarthritis of left knee [M17.12]    Disposition: Home or self care    Patient Instructions:   Current Discharge Medication List        CONTINUE these medications which have NOT CHANGED    Details   aspirin 81 MG Chew Take 1 tablet (81 mg total) by mouth once daily.  Qty: 30 tablet, Refills: 12    Associated Diagnoses: Annual physical exam      cholecalciferol, vitamin D3, (VITAMIN D3) 25 mcg (1,000 unit) capsule Take 1 capsule (1,000 Units total) by mouth once daily.  Qty: 30 capsule, Refills: 0    Associated Diagnoses: Vitamin D deficiency      latanoprost 0.005 % ophthalmic solution INSTILL 1 DROP IN BOTH EYES EVERY EVENING  Qty: 2.5 mL, Refills: 12    Associated Diagnoses: Primary open angle glaucoma of both eyes, mild stage      loratadine (CLARITIN) 10 mg tablet Take 1 tablet (10 mg total) by mouth once daily.  Qty: 1 Bottle, Refills: 0    Associated Diagnoses: Chronic rhinitis      pravastatin (PRAVACHOL) 80 MG tablet Take 1 tablet (80 mg total) by mouth once daily.  Qty: 90 tablet, Refills: 11    Associated Diagnoses: Mixed hyperlipidemia      sildenafiL (VIAGRA) 100 MG tablet Take 1 tablet (100 mg total) by mouth daily as needed for Erectile Dysfunction.  Qty: 10 tablet, Refills: 11    Associated Diagnoses: Erectile dysfunction, unspecified erectile dysfunction type      tamsulosin (FLOMAX) 0.4 mg Cap Take 1 capsule (0.4 mg total) by mouth once daily.  Qty: 90 capsule, Refills: 11    Associated Diagnoses: BPH with urinary obstruction                 Discharge Diagnosis: Primary osteoarthritis of left knee [M17.12]  Condition on Discharge: Stable with no complications to procedure   Diet on Discharge: Same as before.  Activity:  as per instruction sheet.  Discharge to: Home with a responsible adult.  Follow up: 2-4 weeks       Please call my office or pager at [phone] if experienced any weakness or loss of sensation, fever > 101.5, pain uncontrolled with oral medications, persistent nausea/vomiting/or diarrhea, redness or drainage from the incisions, or any other worrisome concerns. If physician on call was not reached or could not communicate with our office for any reason please go to the nearest emergency department      Debby Javier  11/22/2022

## 2022-11-22 NOTE — OP NOTE
Diagnostic Genicular Nerve Block under Fluoroscopic Guidance    The procedure, risks, benefits, and options were discussed with the patient. There are no contraindications to the procedure. The patent expressed understanding and agreed to the procedure. Informed written consent was obtained prior to the start of the procedure and can be found in the patient's chart.    PATIENT NAME: Herman Chirinos   MRN: [id number]     DATE OF PROCEDURE: 11/22/2022    PROCEDURE: Diagnostic Left Genicular Nerve Block under Fluoroscopic Guidance    PRE-OP DIAGNOSIS: Primary osteoarthritis of left knee [M17.12]    POST-OP DIAGNOSIS: Same    PHYSICIAN: Debby Javier MD    ASSISTANTS: Raymundo Flannery MD     MEDICATIONS INJECTED: Bupivacine 0.25%     LOCAL ANESTHETIC INJECTED: Xylocaine 2%     SEDATION: None    ESTIMATED BLOOD LOSS: None    COMPLICATIONS: None    INTERVAL HISTORY: Patient has clinical findings of chronic knee pain that is not relieved by other therapies.     TECHNIQUE: Time-out was performed to identify the patient and procedure to be performed. With the patient laying in a supine position, the surgical area was prepped and draped in the usual sterile fashion using ChloraPrep and a fenestrated drape. Three target sites including the superior lateral genicular nerve where the lateral femoral shaft meets the epicondyle, the superior medial genicular nerve where the medial femoral shaft meets the epicondyle, and the inferior medial genicular nerve where the medial tibial shaft meets the epicondyle, were determined under fluoroscopy guidance. Skin anesthesia was achieved by injecting Lidocaine 2% over the injection sites. A 25 gauge, 3.5 inch spinal quinke needle was then advanced under fluoroscopy in the AP and lateral views into the positions of the geniculate nerves at each site. Once the needle tip position was confirmed on fluoroscopy, negative pressure was applied to confirm no intravascular placement.  1 mL of the  anesthetic listed above was then slowly injected at each site. The needles were removed and bleeding was nil. A sterile dressing was applied. No specimens collected. The patient tolerated the procedure well.       The patient was monitored after the procedure in the recovery area. They were given post-procedure and discharge instructions to follow at home. The patient was discharged in a stable condition.      Debby Javier MD

## 2022-11-22 NOTE — DISCHARGE INSTRUCTIONS
Thank you for allowing us to care for you today. You may receive a survey about the care we provided. Your feedback is valuable and helps us provide excellent care throughout the community.     Home Care Instructions for Pain Management:    1. DIET:   You may resume your normal diet today.   2. BATHING:   You may shower with luke warm water. No tub baths or anything that will soak injection sites under water for the next 24 hours.  3. DRESSING:   You may remove your bandage today.   4. ACTIVITY LEVEL:   You may resume your normal activities 24 hrs after your procedure. Nothing strenuous today.  5. MEDICATIONS:   You may resume your normal medications today. To restart blood thinners, ask your doctor.  6. DRIVING    If you have received any sedatives by mouth today, you may not drive for 12 hours.    If you have received any sedation through your IV, you may not drive for 24 hrs.   7. SPECIAL INSTRUCTIONS:   No heat to the injection site for 24 hrs including, hot bath or shower, heating pad, moist heat, or hot tubs.    Use ice pack to injection site for any pain or discomfort.  Apply ice packs for 20 minute intervals as needed.    IF you have diabetes, be sure to monitor your blood sugar more closely. IF your injection contained steroids your blood sugar levels may become higher than normal.    If you are still having pain upon discharge:  Your pain may improve over the next 48 hours. The anesthetic (numbing medication) works immediately to 48 hours. IF your injection contained a steroid (anti-inflammatory medication), it takes approximately 3 days to start feeling relief and 7-10 days to see your greatest results from the medication. It is possible you may need subsequent injections. This would be discussed at your follow up appointment with pain management or your referring doctor.    Please call the PAIN MANAGEMENT office at [phone] or ON CALL pager at [phone] if you experienced any:   -Weakness or  loss of sensation  -Fever > 101.5  -Pain uncontrolled with oral medications   -Persistent nausea, vomiting, or diarrhea  -Redness or drainage from the injection sites, or any other worrisome concerns.   If physician on call was not reached or could not communicate with our office for any reason please go to the nearest emergency department.   Lumbar/Cervical/Joint Medial Branch Block Pain Diary    Patient Name:  :    Pre-procedure Pain Score: _____/10    Time of injection:     Please fill out the chart below to the best of your ability. We need to know how much percentage of relief in your pain that you have while the numbing medication is active. Please be mindful that this is a diagnostic test and may not last for a long time. If you are asleep during one of the times listed below, you do not have to record that time.     Time After the   Procedure % of pain relief   achieved Pain Score (0-10)   1 hour post-procedure     2 hours post-procedure     3 hours post-procedure     4 hours post-procedure     5 hours post-procedure     6 hours post-procedure     12 hours post-procedure     24 hours post-procedure       Please make sure to return this form or information to the clinic. This information is needed to proceed with your plan of care. There are several options that you can use to send this back to us.    Form can be faxed to us at [phone]  Call us to provide the information at [phone]  Send the information to us through your [facility]    If you have any questions about completing this diary, please call us at [phone].

## 2022-12-23 ENCOUNTER — TELEPHONE (OUTPATIENT)
Dept: PAIN MEDICINE | Facility: CLINIC | Age: 72
End: 2022-12-23
Payer: COMMERCIAL

## 2022-12-23 DIAGNOSIS — N40.1 BPH WITH URINARY OBSTRUCTION: ICD-10-CM

## 2022-12-23 DIAGNOSIS — N13.8 BPH WITH URINARY OBSTRUCTION: ICD-10-CM

## 2022-12-23 RX ORDER — TAMSULOSIN HYDROCHLORIDE 0.4 MG/1
0.4 CAPSULE ORAL DAILY
Qty: 90 CAPSULE | Refills: 2 | Status: SHIPPED | OUTPATIENT
Start: 2022-12-23 | End: 2023-09-04

## 2022-12-23 NOTE — TELEPHONE ENCOUNTER
Patient received a left genicular nerve block 11/22 and received 100% relief he would like to proceed with RFA

## 2022-12-23 NOTE — TELEPHONE ENCOUNTER
----- Message from Mechelle Machuca sent at 12/23/2022  9:41 AM CST -----  Contact: [phone]  Requesting an RX refill or new RX.  Is this a refill or new RX: refill  RX name and strength (copy/paste from chart):  tamsulosin (FLOMAX) 0.4 mg Cap  Is this a 30 day or 90 day RX: 90 day  Pharmacy name and phone # (copy/paste from chart):  tamsulosin (FLOMAX) 0.4 mg Cap  The doctors have asked that we provide their patients with the following 2 reminders -- prescription refills can take up to 72 hours, and a friendly reminder that in the future you can use your [facility] account to request refills: aware

## 2022-12-23 NOTE — TELEPHONE ENCOUNTER
No new care gaps identified.  [facility] Embedded Care Gaps. Reference number: [id number]. 12/23/2022   10:09:50 AM CST

## 2022-12-23 NOTE — TELEPHONE ENCOUNTER
----- Message from Savana Lara sent at 12/23/2022  1:23 PM CST -----   Name of Who is Calling:     What is the request in detail:  patient  request call back in reference to pain diary information Please contact to further discuss and advise      Can the clinic reply by [facility]:     What Number to Call Back if not in [facility]:  [phone]

## 2023-01-14 ENCOUNTER — OFFICE VISIT (OUTPATIENT)
Dept: URGENT CARE | Facility: CLINIC | Age: 73
End: 2023-01-14
Payer: MEDICARE

## 2023-01-14 VITALS
TEMPERATURE: 99 F | BODY MASS INDEX: 28.71 KG/M2 | HEART RATE: 97 BPM | WEIGHT: 212 LBS | OXYGEN SATURATION: 95 % | SYSTOLIC BLOOD PRESSURE: 129 MMHG | RESPIRATION RATE: 17 BRPM | HEIGHT: 72 IN | DIASTOLIC BLOOD PRESSURE: 87 MMHG

## 2023-01-14 DIAGNOSIS — U07.1 COVID-19 VIRUS DETECTED: ICD-10-CM

## 2023-01-14 DIAGNOSIS — U07.1 COVID-19 VIRUS INFECTION: Primary | ICD-10-CM

## 2023-01-14 LAB
CTP QC/QA: YES
SARS-COV-2 AG RESP QL IA.RAPID: POSITIVE

## 2023-01-14 PROCEDURE — 87811 SARS-COV-2 COVID19 W/OPTIC: CPT | Mod: QW,CR,S$GLB, | Performed by: NURSE PRACTITIONER

## 2023-01-14 PROCEDURE — 99214 OFFICE O/P EST MOD 30 MIN: CPT | Mod: CS,S$GLB,, | Performed by: NURSE PRACTITIONER

## 2023-01-14 PROCEDURE — 87811 SARS CORONAVIRUS 2 ANTIGEN POCT, MANUAL READ: ICD-10-PCS | Mod: QW,CR,S$GLB, | Performed by: NURSE PRACTITIONER

## 2023-01-14 PROCEDURE — 99214 PR OFFICE/OUTPT VISIT, EST, LEVL IV, 30-39 MIN: ICD-10-PCS | Mod: CS,S$GLB,, | Performed by: NURSE PRACTITIONER

## 2023-01-14 NOTE — PROGRESS NOTES
Subjective:       Patient ID: Herman Chirinos is a 72 y.o. male.    Vitals:  height is 6' (1.829 m) and weight is 96.2 kg (212 lb).     Chief Complaint: Cough    Patient presents to the clinic with a cough, congestion and weakness x yesterday    Cough  The cough is Productive of sputum. Associated symptoms include nasal congestion and postnasal drip. Associated symptoms comments: Dizzy  . Treatments tried: allergy.     HENT:  Positive for postnasal drip.    Respiratory:  Positive for cough.      Objective:      Physical Exam      Assessment:       No diagnosis found.      Plan:         There are no diagnoses linked to this encounter.

## 2023-01-14 NOTE — PROGRESS NOTES
Subjective:       Patient ID: Herman Chirinos is a 72 y.o. male.    Vitals:  height is 6' (1.829 m) and weight is 96.2 kg (212 lb). His temperature is 99.1 °F (37.3 °C). His blood pressure is 129/87 and his pulse is 97. His respiration is 17 and oxygen saturation is 95%.     Chief Complaint: Cough    This is a 72 y.o. male who presents today with a chief complaint of cough, congestion  x yesterday, patient reports he is vaccinated for COVID with boosters, denies fever, body aches or chills, denies wheezing or shortness of breath, denies nausea, vomiting, diarrhea or abdominal pain, denies chest pain or dizziness positional lightheadedness, denies sore throat or trouble swallowing, denies loss of taste or smell, or any other symptoms        Cough  The cough is Productive of sputum. Associated symptoms include nasal congestion and postnasal drip. Associated symptoms comments: Dizzy  . Treatments tried: allergy.     HENT:  Positive for postnasal drip.    Respiratory:  Positive for cough.      Objective:      Physical Exam   Constitutional: He is oriented to person, place, and time. He appears well-developed. He is cooperative.  Non-toxic appearance. He does not appear ill. No distress.      Comments:Patient sitting comfortably on the exam table, non toxic appearance  and answering questions appropriately, no acute distress         HENT:   Head: Normocephalic and atraumatic.   Ears:   Right Ear: Hearing, tympanic membrane, external ear and ear canal normal.   Left Ear: Hearing, tympanic membrane, external ear and ear canal normal.   Nose: Nose normal. No mucosal edema, rhinorrhea or nasal deformity. No epistaxis. Right sinus exhibits no maxillary sinus tenderness and no frontal sinus tenderness. Left sinus exhibits no maxillary sinus tenderness and no frontal sinus tenderness.   Mouth/Throat: Uvula is midline, oropharynx is clear and moist and mucous membranes are normal. No trismus in the jaw. Normal dentition. No  uvula swelling. No oropharyngeal exudate, posterior oropharyngeal edema or posterior oropharyngeal erythema.   Eyes: Conjunctivae and lids are normal. No scleral icterus.   Neck: Trachea normal and phonation normal. Neck supple. No edema present. No erythema present. No neck rigidity present.   Cardiovascular: Normal rate, regular rhythm, normal heart sounds and normal pulses.   Pulmonary/Chest: Effort normal and breath sounds normal. No stridor. No respiratory distress. He has no decreased breath sounds. He has no wheezes. He has no rhonchi. He has no rales.   Abdominal: Normal appearance.   Musculoskeletal: Normal range of motion.         General: No deformity. Normal range of motion.   Neurological: He is alert and oriented to person, place, and time. He exhibits normal muscle tone. Coordination normal.   Skin: Skin is warm, dry, intact, not diaphoretic and not pale.   Psychiatric: His speech is normal and behavior is normal. Judgment and thought content normal.   Nursing note and vitals reviewed.      Results for orders placed or performed in visit on 01/14/23   SARS Coronavirus 2 Antigen, POCT Manual Read   Result Value Ref Range    SARS Coronavirus 2 Antigen Positive (A) Negative     Acceptable Yes          Patient in no acute distress.  Vitals reassuring.Risk of complication score 3 .  Positive COVID 19 results discussed with patient in detail.  Detailed education provided about COVID 19 precautions and recommendations as per CDC guidelines.  Paxlovid recommendation, side effects and drug to drug interactions discussed with patient in detail.  All patient's medications reviewed with patient in detail.  Patient reports he is not taking Flomax at all and takes Viagra only as needed.  I discussed with patient in detail the drug to drug interaction of Flomax and Viagra with Paxlovid and he should not take these medication while on Paxlovid.  Patient verbalized understanding and would like to  initiate the Paxlovid.  Drug to drug interaction with Paxlovid and patient's medication checked on [place] COVID 19 website.  And discussed with patient in depth.  Patient understands that he will not take Flomax or Viagra while on Paxlovid.  Proper hydration advised.  Red flags discussed with patient in depth.  I reiterated the importance of further evaluation if no improvement in symptoms with strict ER precautions.  Discussed results/diagnosis/plan in depth with patient in clinic. Strict precautions given to patient to monitor for worsening signs and symptoms. Advised to follow up with primary.All questions answered. Strict ER precautions given. If your symptoms worsens or fail to improve you should go to the Emergency Room. Discharge and follow-up instructions given verbally/printed. Discharge and follow-up instructions discussed with the patient who expressed understanding and willingness to comply with my recommendations.Patient voiced understanding and in agreement with current treatment plan.     Please be advised this text was dictated with [employer] software and may contain errors due to translation.    Assessment:       1. COVID-19 virus infection          Plan:         COVID-19 virus infection  -     SARS Coronavirus 2 Antigen, POCT Manual Read    Other orders  -     nirmatrelvir-ritonavir 300 mg (150 mg x 2)-100 mg copackaged tablets (EUA); Take 3 tablets by mouth 2 (two) times daily for 5 days. Each dose contains 2 nirmatrelvir (pink tablets) and 1 ritonavir (white tablet). Take all 3 tablets together  Dispense: 30 tablet; Refill: 0               Patient Instructions     You have tested positive for COVID-19 today.        ISOLATION    If you tested positive and do not have symptoms, you must isolate for 5 days starting on the day of the positive test. I    If you tested positive and have symptoms, you must isolate for 5 days starting on the day of the first symptoms,  not the day of the positive  test.    This is the most important part, both the CDC and the [facility] emphasize that you do not test out of isolation.    After 5 days, if your symptoms have improved and you have not had fever on day 5, you can return to the community on day 6- NO TESTING REQUIRED!     In fact, we do not retest if you were positive in the last 90 days.    After your 5 days of isolation are completed, the CDC recommends strict mask use for the first 5 days that you come out of isolation    CDC Testing and Quarantine Guidelines for patients with exposure to a known-positive COVID-19 person:    ·     A 'close exposure' is defined as anyone who has had an exposure (masked or unmasked) to a known COVID -19 positive person            within 6 feet of someone            for a cumulative total of 15 minutes or more over a 24-hour period.    ·     vaccinated Have been boosted or completed the primary series of Pfizer or Moderna vaccine within the last 6 months or completed the primary series of J&J vaccine within the last 2 months and/or had a positive test within 90 days            do NOT need to quarantine after contact with someone who had COVID-19 unless they have symptoms.            fully vaccinated people who have not had a positive test within 90 days, should get tested 3-5 days after their exposure, even if they don't have symptoms and wear a mask indoors in public for 10 days following exposure or until their test result is negative on day 5.            If you develop symptoms test and quarantine.      ·     Unvaccinated, or are more than six months out from their second mRNA dose (or more than 2 months after the J&J vaccine) and not yet boosted,  and/or NOT had a positive test within 90 days and meet 'close exposure'    you are required by CDC guidelines to quarantine for at least 5 days from time of exposure followed by 5 days of strict masking. It is recommended, but not required to test after 5 days, unless you develop symptoms,  in which case you should test at that time.    If you do decide to test at 5 days and are asymptomatic, the risk is that if you test without symptoms on Day 5 for example) and you are positive, your 5 day isolation begins on that day, and you turned your 5 day quarantine into 10 days.            If your exposure does not meet the above definition, you can return to your normal daily activities to include social distancing, wearing a mask and frequent handwashing.    Alternatively, if a 5-day quarantine is not feasible, it is imperative that an exposed person wear a well-fitting mask at all times when around others for 10 days after exposure.           If your condition worsens or fails to improve we recommend that you receive another evaluation at the ER immediately or contact your PCP to discuss your concerns or return here. You must understand that you've received an urgent care treatment only and that you may be released before all your medical problems are known or treated. You the patient will arrange for followup care as instructed.    If you were prescribed antibiotics, please take them to completion.  If you were prescribed a narcotic medication, do not drive or operate heavy equipment or machinery while taking these medications.  Please follow up with your primary care doctor or specialist as needed.  If you  smoke, please stop smoking.

## 2023-01-14 NOTE — PATIENT INSTRUCTIONS
You have tested positive for COVID-19 today.        ISOLATION    If you tested positive and do not have symptoms, you must isolate for 5 days starting on the day of the positive test. I    If you tested positive and have symptoms, you must isolate for 5 days starting on the day of the first symptoms,  not the day of the positive test.    This is the most important part, both the CDC and the [facility] emphasize that you do not test out of isolation.    After 5 days, if your symptoms have improved and you have not had fever on day 5, you can return to the community on day 6- NO TESTING REQUIRED!     In fact, we do not retest if you were positive in the last 90 days.    After your 5 days of isolation are completed, the CDC recommends strict mask use for the first 5 days that you come out of isolation    CDC Testing and Quarantine Guidelines for patients with exposure to a known-positive COVID-19 person:    ·     A 'close exposure' is defined as anyone who has had an exposure (masked or unmasked) to a known COVID -19 positive person            within 6 feet of someone            for a cumulative total of 15 minutes or more over a 24-hour period.    ·     vaccinated Have been boosted or completed the primary series of Pfizer or Moderna vaccine within the last 6 months or completed the primary series of J&J vaccine within the last 2 months and/or had a positive test within 90 days            do NOT need to quarantine after contact with someone who had COVID-19 unless they have symptoms.            fully vaccinated people who have not had a positive test within 90 days, should get tested 3-5 days after their exposure, even if they don't have symptoms and wear a mask indoors in public for 10 days following exposure or until their test result is negative on day 5.            If you develop symptoms test and quarantine.      ·     Unvaccinated, or are more than six months out from their second mRNA dose (or more than 2 months  after the J&J vaccine) and not yet boosted,  and/or NOT had a positive test within 90 days and meet 'close exposure'    you are required by CDC guidelines to quarantine for at least 5 days from time of exposure followed by 5 days of strict masking. It is recommended, but not required to test after 5 days, unless you develop symptoms, in which case you should test at that time.    If you do decide to test at 5 days and are asymptomatic, the risk is that if you test without symptoms on Day 5 for example) and you are positive, your 5 day isolation begins on that day, and you turned your 5 day quarantine into 10 days.            If your exposure does not meet the above definition, you can return to your normal daily activities to include social distancing, wearing a mask and frequent handwashing.    Alternatively, if a 5-day quarantine is not feasible, it is imperative that an exposed person wear a well-fitting mask at all times when around others for 10 days after exposure.           If your condition worsens or fails to improve we recommend that you receive another evaluation at the ER immediately or contact your PCP to discuss your concerns or return here. You must understand that you've received an urgent care treatment only and that you may be released before all your medical problems are known or treated. You the patient will arrange for followup care as instructed.    If you were prescribed antibiotics, please take them to completion.  If you were prescribed a narcotic medication, do not drive or operate heavy equipment or machinery while taking these medications.  Please follow up with your primary care doctor or specialist as needed.  If you  smoke, please stop smoking.

## 2023-01-18 ENCOUNTER — NURSE TRIAGE (OUTPATIENT)
Dept: ADMINISTRATIVE | Facility: CLINIC | Age: 73
End: 2023-01-18
Payer: MEDICARE

## 2023-01-18 NOTE — TELEPHONE ENCOUNTER
Has followed all directions, wear mask, wash hands and has followed all the orders and still got COVID. Home for 5 days for isolation. Wanting to know what else that he can do to avoid getting COVID. Advised to limit social events and travelling. Avoid large indoor events. Caller verbalized understanding.  Reason for Disposition   Health Information question, no triage required and triager able to answer question    Protocols used: Information Only Call - No Triage-A-

## 2023-01-24 ENCOUNTER — TELEPHONE (OUTPATIENT)
Dept: INTERNAL MEDICINE | Facility: CLINIC | Age: 73
End: 2023-01-24
Payer: MEDICARE

## 2023-01-24 NOTE — TELEPHONE ENCOUNTER
----- Message from Valencia Nj sent at 1/24/2023 10:09 AM CST -----  Regarding: Medical Assistance  Contact: Patient  Patient is requesting a call back regarding covid symptoms   Patient tested positive for covid on 01/14/2023   Patient stated he would like advice on medicine he can take to assist with symptoms   Please Assist     Patient can be reached at [phone]

## 2023-01-24 NOTE — TELEPHONE ENCOUNTER
Hi, please let him know that some patients get a rebound in symptoms after finishing the medicine for covid -- the paxlovid.  He should go to an urgent care or ER if he develops any shortness of breath.  Let me know if patient has any more questions.  Thank you, Avila Toledo

## 2023-01-24 NOTE — TELEPHONE ENCOUNTER
Spoke with patient re: end of Covid-19 virus and still having a low grade temp x 10 days later (99.1) patient taking mucinex currently advised patient that he can take tylenol or motrin to help with the temp as well as the body aches. Patient also stated he is coughing, phlegm is thick but clear. Advised patient that he should still wear mask and practice social distance until he doesn't have temp any longer.     Advised patient that I will send message to  regarding his cough.- Priyanka YE MA

## 2023-01-24 NOTE — TELEPHONE ENCOUNTER
Called patient advised of the possibility of rebounding symptoms in patients that take the medication paxlovid for Covid. Also advised patient that in the event he start experiencing any shortness of breath to please go to Urgent Care or ED. Also advised patient to continue with treating symptoms, staying hydrated and resting. Patient expressed understanding of my instructions.- Priyanka YE MA

## 2023-04-11 DIAGNOSIS — H40.1131 PRIMARY OPEN ANGLE GLAUCOMA OF BOTH EYES, MILD STAGE: ICD-10-CM

## 2023-04-11 NOTE — TELEPHONE ENCOUNTER
----- Message from Ce Rajeev sent at 4/11/2023 12:41 PM CDT -----  Type: RX Refill Request    Who Called: pt requesting to speak to nurse     Have you contacted your pharmacy:    Refill or New Rx:latanoprost 0.005 % ophthalmic solution - needs pa    RX Name and Strength:    How is the patient currently taking it? (ex. 1XDay):    Is this a 30 day or 90 day RX:    Preferred Pharmacy with phone number:  [facility] [facility] #[id number] - [place] - [address] [facility] [address] [place]  [address]  [place] [address]  Phone: [phone] Fax: [phone]        Local or Mail Order:    Ordering Provider:    Would the patient rather a call back or a response via [facility]? call    Best Call Back Number:[phone] (home)       Additional Information:

## 2023-04-12 RX ORDER — LATANOPROST 50 UG/ML
1 SOLUTION/ DROPS OPHTHALMIC NIGHTLY
Qty: 2.5 ML | Refills: 12 | Status: SHIPPED | OUTPATIENT
Start: 2023-04-12 | End: 2024-01-16 | Stop reason: SDUPTHER

## 2023-04-13 ENCOUNTER — TELEPHONE (OUTPATIENT)
Dept: OPTOMETRY | Facility: CLINIC | Age: 73
End: 2023-04-13
Payer: MEDICARE

## 2023-04-25 ENCOUNTER — TELEPHONE (OUTPATIENT)
Dept: PAIN MEDICINE | Facility: CLINIC | Age: 73
End: 2023-04-25
Payer: MEDICARE

## 2023-04-25 NOTE — TELEPHONE ENCOUNTER
----- Message from Mallorie Joshi sent at 4/25/2023  1:02 PM CDT -----  Regarding: patient call back  Type: Patient Call Back    Who called: Elena- daughter     What is the request in detail: Asked for a call to get nerve blocked scheduled     Can the clinic reply by [facility]? No     Would the patient rather a call back or a response via [facility]? Call     Best call back number: [phone]

## 2023-05-04 ENCOUNTER — TELEPHONE (OUTPATIENT)
Dept: PAIN MEDICINE | Facility: CLINIC | Age: 73
End: 2023-05-04
Payer: MEDICARE

## 2023-05-04 NOTE — TELEPHONE ENCOUNTER
----- Message from Beth Arias sent at 5/4/2023  2:40 PM CDT -----  Regarding: Confirming Appt information tomorrow(not first time asking)  Contact: @[phone]  Elena Chirinos (daughter is calling on behalf of patient to confirm appt she made is correct for nerve block. Please call and advise 5./5/23     Was scheduled with unfamiliar provider, confirming it is okay.    [phone]

## 2023-05-04 NOTE — TELEPHONE ENCOUNTER
Staff reached out to pt daughter in regards to her requesting a callback regarding her fathers appointment tomorrow with Leila Chen staff informed her that Leila Chen is the nurse practitioner and she is able to put in orders for her dad to receive a nerve block if applicable she verbalized understanding and thanked staff for reaching out to her   no

## 2023-05-04 NOTE — TELEPHONE ENCOUNTER
----- Message from Beth Arias sent at 5/4/2023  2:40 PM CDT -----  Regarding: Confirming Appt information tomorrow(not first time asking)  Contact: @[phone]  Elena Chirinos (daughter is calling on behalf of patient to confirm appt she made is correct for nerve block. Please call and advise 5./5/23     Was scheduled with unfamiliar provider, confirming it is okay.    [phone]

## 2023-05-05 ENCOUNTER — OFFICE VISIT (OUTPATIENT)
Dept: PAIN MEDICINE | Facility: CLINIC | Age: 73
End: 2023-05-05
Payer: MEDICARE

## 2023-05-05 VITALS
TEMPERATURE: 97 F | WEIGHT: 206.56 LBS | HEART RATE: 70 BPM | HEIGHT: 72 IN | BODY MASS INDEX: 27.98 KG/M2 | RESPIRATION RATE: 19 BRPM | SYSTOLIC BLOOD PRESSURE: 111 MMHG | DIASTOLIC BLOOD PRESSURE: 78 MMHG

## 2023-05-05 DIAGNOSIS — M17.12 PRIMARY OSTEOARTHRITIS OF LEFT KNEE: ICD-10-CM

## 2023-05-05 DIAGNOSIS — M25.562 CHRONIC PAIN OF LEFT KNEE: Primary | ICD-10-CM

## 2023-05-05 DIAGNOSIS — G89.29 CHRONIC PAIN OF LEFT KNEE: Primary | ICD-10-CM

## 2023-05-05 PROCEDURE — 99213 OFFICE O/P EST LOW 20 MIN: CPT | Mod: S$GLB,,, | Performed by: NURSE PRACTITIONER

## 2023-05-05 PROCEDURE — 99213 PR OFFICE/OUTPT VISIT, EST, LEVL III, 20-29 MIN: ICD-10-PCS | Mod: S$GLB,,, | Performed by: NURSE PRACTITIONER

## 2023-05-05 PROCEDURE — 99214 OFFICE O/P EST MOD 30 MIN: CPT | Mod: PBBFAC | Performed by: NURSE PRACTITIONER

## 2023-05-05 NOTE — PROGRESS NOTES
Chronic Pain- Established Visit    PCP: Avila Toledo MD    REFERRING PHYSICIAN: Timbo, Federicoreferral    CHIEF COMPLAINT: Bilateral Knee Pain Left > Right    Original HISTORY OF PRESENT ILLNESS: Herman Chirinos presents to the clinic for the evaluation of the above pain. The pain started years ago and has been progressively worse.     Original Pain Description:  The pain is located in the bilateral knees and the left is much worse than the right. It does not radiate. The pain is described as aching, dull, and stabbing. Exacerbating factors: Walking, Getting out of bed/chair, and stumbling. Mitigating factors medications, physical therapy, and rest. Symptoms interfere with daily activity and falling asleep. The patient feels like symptoms have been worsening. Patient denies urinary incontinence, bowel incontinence, significant weight loss, significant motor weakness, and loss of sensations.    Original PAIN SCORES:  Best: Pain is 3  Worst: Pain is 10  Usually: Pain is 5  Current: Pain is 3    INTERVAL HISTORY:   Interval History 5/5/2023:  The patient returns to clinic today for follow up of knee pain. He is s/p left genicular nerve block on 11/22/2022. He reports 90% relief of his left knee pain. He even noted relief for a month. His pain has returned to baseline. He reports left knee pain, worse with prolonged walking. He reports difficulty bending the knee. He continues to perform a home exercise routine. He denies any other health changes. His pain today is 4/10.    6 weeks of Conservative therapy (PT/Chiro/Home Exercises with Dates)  PT - March 2022 - helped ROM and strength     Treatments / Medications: (Ice/Heat/NSAIDS/APAP/etc):  Aleve - occasionally  Knee Sleeve      Report:  Consistent and Appropriate with report.    Interventional Pain Procedures: (Previous injections)  2 CSI in Left Knee over the past few years with decreasing efficacy.   11/22/2022- Left genicular nerve block     Past Medical History:    Diagnosis Date    Appendicitis     Benign neoplasm of colon     Cataract     Chronic rhinitis     Colon polyps     Glaucoma     Hyperlipidemia     IFG (impaired fasting glucose) 1/8/2016    Lumbar stenosis     Osteoarthritis of both knees 5/24/2018    Tubular adenoma of colon: 4/16 repeat 2021 4/7/2017     Past Surgical History:   Procedure Laterality Date    APPENDECTOMY      COLONOSCOPY  2007    repeat recommended in five years    COLONOSCOPY N/A 4/7/2016    Procedure: COLONOSCOPY;  Surgeon: Luis Yusuf MD;  Location: [facility] ENDO (4TH FLR);  Service: Endoscopy;  Laterality: N/A;  last procedure with Maciel, patient requesting very early appt    COLONOSCOPY N/A 7/8/2021    Procedure: COLONOSCOPY;  Surgeon: Austin Pettit MD;  Location: [facility] ENDO (4TH FLR);  Service: Endoscopy;  Laterality: N/A;  Fully vacc Covid-19 - pg     Social History     Socioeconomic History    Marital status:     Number of children: 6   Occupational History    Occupation: [employer]     Employer: [employer]     Comment: [employer]   Tobacco Use    Smoking status: Never    Smokeless tobacco: Never   Substance and Sexual Activity    Alcohol use: No    Drug use: No    Sexual activity: Yes     Partners: Female   Social History Narrative     2020, lives alone. 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 Has a Islam life. Active with yard work, walks. Prev work as  at [employer].     Family History   Problem Relation Age of Onset    Coronary artery disease Mother     Hypertension Mother     Heart disease Mother         heart transplant    Diabetes Mellitus Father     Diabetes Father     Kidney disease Sister     No Known Problems Sister     Kidney disease Brother     Cancer Brother         bladder ca    No Known Problems Brother     Cancer Daughter         br ca, CR    No Known Problems Daughter     No Known Problems Daughter     No Known Problems Son     No Known Problems Son     No Known Problems Son     No Known  Problems Maternal Aunt     No Known Problems Maternal Uncle     No Known Problems Paternal Aunt     No Known Problems Paternal Uncle     No Known Problems Maternal Grandmother     No Known Problems Maternal Grandfather     No Known Problems Paternal Grandmother     No Known Problems Paternal Grandfather     Stroke Neg Hx     Glaucoma Neg Hx     Cataracts Neg Hx     Blindness Neg Hx     Amblyopia Neg Hx     Macular degeneration Neg Hx     Retinal detachment Neg Hx     Strabismus Neg Hx     Thyroid disease Neg Hx     Psoriasis Neg Hx     Tremor Neg Hx     Parkinsonism Neg Hx        Review of patient's allergies indicates:  No Known Allergies    Current Outpatient Medications   Medication Sig    aspirin 81 MG Chew Take 1 tablet (81 mg total) by mouth once daily.    cholecalciferol, vitamin D3, (VITAMIN D3) 25 mcg (1,000 unit) capsule Take 1 capsule (1,000 Units total) by mouth once daily.    latanoprost 0.005 % ophthalmic solution Place 1 drop into both eyes every evening.    loratadine (CLARITIN) 10 mg tablet Take 1 tablet (10 mg total) by mouth once daily.    pravastatin (PRAVACHOL) 80 MG tablet TAKE 1 TABLET(80 MG) BY MOUTH EVERY DAY    sildenafiL (VIAGRA) 100 MG tablet Take 1 tablet (100 mg total) by mouth daily as needed for Erectile Dysfunction.    tamsulosin (FLOMAX) 0.4 mg Cap Take 1 capsule (0.4 mg total) by mouth once daily.     No current facility-administered medications for this visit.       ROS:  GENERAL: No fever. No chills. No fatigue. Denies weight loss. Denies weight gain.  HEENT: Denies headaches. Denies vision change. Denies eye pain. Denies double vision. Denies ear pain.   CV: Denies chest pain.   PULM: Denies of shortness of breath.  GI: Denies constipation. No diarrhea. No abdominal pain. Denies nausea. Denies vomiting. No blood in stool.  HEME: Denies bleeding problems.  : Denies urgency. No painful urination. No blood in urine.  MS: B/l Knee Pain.  SKIN: Denies rash.   NEURO: Denies  seizures. No weakness.  PSYCH:  Denies difficulty sleeping. No anxiety. Denies depression. No suicidal thoughts.       VITALS:   Vitals:    23 0905   BP: 111/78   Pulse: 70   Resp: 19   Temp: 97.4 °F (36.3 °C)   TempSrc: Oral   Weight: 93.7 kg (206 lb 9.1 oz)   Height: 6' (1.829 m)   PainSc:   4   PainLoc: Knee         PHYSICAL EXAM:   GENERAL: Well appearing, in no acute distress, alert and oriented x3.  PSYCH:  Mood and affect appropriate.  SKIN: Skin color, texture, turgor normal, no rashes or lesions.  HEENT:  Normocephalic, atraumatic. Cranial nerves grossly intact.  PULM: No evidence of respiratory difficulty, symmetric chest rise.  GI:  Non-distended  BACK: Normal range of motion.  EXTREMITIES: Mild swelling noted in left knee.   MUSCULOSKELETAL: There is pain with palpation over medial joint line of left knee. Limited ROM with pain on flexion. Positive crepitus noted to left knee. Bilateral lower extremity strength is normal and symmetric. No atrophy is noted.  NEURO: Sensation is equal and appropriate bilaterally.   GAIT: Antalgic but independent w/o assistive device.    LABS:  Reviewed in Epic:  Recent A1c 5.7  GFR >60   H/H wnl.    IMAGING:    Bilateral Knee Xrs 10/18/22  Bilateral tricompartmental osteoarthritis with severe medial joint space narrowing L>R.       ASSESSMENT: 72 y.o. year old male with bilateral knee pain, consistent with the followin. Chronic pain of left knee  Procedure Order to Pain Management      2. Primary osteoarthritis of left knee              PLAN:    - Previous imaging was reviewed and discussed with the patient today.    - Schedule for left genicular RFA.     - Continue home exercise routine.     - Continue current medication.     - RTC 3 weeks after above procedure.     The above plan and management options were discussed at length with patient. Patient is in agreement with the above and verbalized understanding.     Leila Chen  2023

## 2023-05-08 ENCOUNTER — TELEPHONE (OUTPATIENT)
Dept: ADMINISTRATIVE | Facility: OTHER | Age: 73
End: 2023-05-08
Payer: MEDICARE

## 2023-05-11 ENCOUNTER — TELEPHONE (OUTPATIENT)
Dept: ADMINISTRATIVE | Facility: OTHER | Age: 73
End: 2023-05-11
Payer: MEDICARE

## 2023-05-17 ENCOUNTER — IMMUNIZATION (OUTPATIENT)
Dept: INTERNAL MEDICINE | Facility: CLINIC | Age: 73
End: 2023-05-17
Payer: MEDICARE

## 2023-05-17 DIAGNOSIS — Z23 NEED FOR VACCINATION: Primary | ICD-10-CM

## 2023-05-17 PROCEDURE — 0124A COVID-19, MRNA, LNP-S, BIVALENT BOOSTER, PF, 30 MCG/0.3 ML DOSE: CPT | Mod: ,,, | Performed by: INTERNAL MEDICINE

## 2023-05-17 PROCEDURE — 91312 COVID-19, MRNA, LNP-S, BIVALENT BOOSTER, PF, 30 MCG/0.3 ML DOSE: ICD-10-PCS | Mod: ,,, | Performed by: INTERNAL MEDICINE

## 2023-05-17 PROCEDURE — 0124A COVID-19, MRNA, LNP-S, BIVALENT BOOSTER, PF, 30 MCG/0.3 ML DOSE: ICD-10-PCS | Mod: ,,, | Performed by: INTERNAL MEDICINE

## 2023-05-17 PROCEDURE — 91312 COVID-19, MRNA, LNP-S, BIVALENT BOOSTER, PF, 30 MCG/0.3 ML DOSE: CPT | Mod: ,,, | Performed by: INTERNAL MEDICINE

## 2023-06-05 ENCOUNTER — PATIENT MESSAGE (OUTPATIENT)
Dept: PAIN MEDICINE | Facility: OTHER | Age: 73
End: 2023-06-05
Payer: MEDICARE

## 2023-06-06 ENCOUNTER — HOSPITAL ENCOUNTER (OUTPATIENT)
Facility: OTHER | Age: 73
Discharge: HOME | End: 2023-06-06
Attending: ANESTHESIOLOGY | Admitting: ANESTHESIOLOGY
Payer: MEDICARE

## 2023-06-06 VITALS
DIASTOLIC BLOOD PRESSURE: 74 MMHG | OXYGEN SATURATION: 99 % | HEART RATE: 62 BPM | WEIGHT: 215 LBS | RESPIRATION RATE: 16 BRPM | TEMPERATURE: 98 F | HEIGHT: 71 IN | BODY MASS INDEX: 30.1 KG/M2 | SYSTOLIC BLOOD PRESSURE: 120 MMHG

## 2023-06-06 DIAGNOSIS — M17.12 PRIMARY OSTEOARTHRITIS OF LEFT KNEE: ICD-10-CM

## 2023-06-06 DIAGNOSIS — G89.29 CHRONIC PAIN: ICD-10-CM

## 2023-06-06 DIAGNOSIS — M25.562 CHRONIC PAIN OF LEFT KNEE: Primary | ICD-10-CM

## 2023-06-06 DIAGNOSIS — G89.29 CHRONIC PAIN OF LEFT KNEE: Primary | ICD-10-CM

## 2023-06-06 PROCEDURE — A4649 SURGICAL SUPPLIES: HCPCS | Performed by: ANESTHESIOLOGY

## 2023-06-06 PROCEDURE — 64624 PR DESTRUCT, NEUROLYTIC AGENT, GENICULAR NERVE, W/IMG: ICD-10-PCS | Mod: LT,,, | Performed by: ANESTHESIOLOGY

## 2023-06-06 PROCEDURE — 64624 DSTRJ NULYT AGT GNCLR NRV: CPT | Mod: LT | Performed by: ANESTHESIOLOGY

## 2023-06-06 PROCEDURE — 63600175 PHARM REV CODE 636 W HCPCS: Performed by: ANESTHESIOLOGY

## 2023-06-06 PROCEDURE — 25000003 PHARM REV CODE 250: Performed by: ANESTHESIOLOGY

## 2023-06-06 PROCEDURE — 64624 DSTRJ NULYT AGT GNCLR NRV: CPT | Mod: LT,,, | Performed by: ANESTHESIOLOGY

## 2023-06-06 RX ORDER — MIDAZOLAM HYDROCHLORIDE 1 MG/ML
INJECTION INTRAMUSCULAR; INTRAVENOUS
Status: DISCONTINUED | OUTPATIENT
Start: 2023-06-06 | End: 2023-06-06 | Stop reason: HOSPADM

## 2023-06-06 RX ORDER — LIDOCAINE HYDROCHLORIDE 10 MG/ML
INJECTION, SOLUTION EPIDURAL; INFILTRATION; INTRACAUDAL; PERINEURAL
Status: DISCONTINUED | OUTPATIENT
Start: 2023-06-06 | End: 2023-06-06 | Stop reason: HOSPADM

## 2023-06-06 RX ORDER — FENTANYL CITRATE 50 UG/ML
INJECTION, SOLUTION INTRAMUSCULAR; INTRAVENOUS
Status: DISCONTINUED | OUTPATIENT
Start: 2023-06-06 | End: 2023-06-06 | Stop reason: HOSPADM

## 2023-06-06 RX ORDER — TRIAMCINOLONE ACETONIDE 40 MG/ML
INJECTION, SUSPENSION INTRA-ARTICULAR; INTRAMUSCULAR
Status: DISCONTINUED | OUTPATIENT
Start: 2023-06-06 | End: 2023-06-06 | Stop reason: HOSPADM

## 2023-06-06 RX ORDER — BUPIVACAINE HYDROCHLORIDE 2.5 MG/ML
INJECTION, SOLUTION EPIDURAL; INFILTRATION; INTRACAUDAL
Status: DISCONTINUED | OUTPATIENT
Start: 2023-06-06 | End: 2023-06-06 | Stop reason: HOSPADM

## 2023-06-06 RX ORDER — SODIUM CHLORIDE 9 MG/ML
INJECTION, SOLUTION INTRAVENOUS CONTINUOUS
Status: DISCONTINUED | OUTPATIENT
Start: 2023-06-06 | End: 2023-06-06 | Stop reason: HOSPADM

## 2023-06-06 RX ORDER — LIDOCAINE HYDROCHLORIDE 20 MG/ML
INJECTION, SOLUTION INFILTRATION; PERINEURAL
Status: DISCONTINUED | OUTPATIENT
Start: 2023-06-06 | End: 2023-06-06 | Stop reason: HOSPADM

## 2023-06-06 NOTE — DISCHARGE SUMMARY
Discharge Note  Short Stay      SUMMARY     Admit Date: 6/6/2023    Attending Physician: Debby Javier      Discharge Physician: Debby Javier      Discharge Date: 6/6/2023 2:08 PM    Procedure(s) (LRB):  RADIOFREQUENCY ABLATION, LEFT GENICULAR COOLED (Left)    Final Diagnosis: Chronic pain of left knee [M25.562, G89.29]    Disposition: Home or self care    Patient Instructions:   Current Discharge Medication List        CONTINUE these medications which have NOT CHANGED    Details   aspirin 81 MG Chew Take 1 tablet (81 mg total) by mouth once daily.  Qty: 30 tablet, Refills: 12    Associated Diagnoses: Annual physical exam      cholecalciferol, vitamin D3, (VITAMIN D3) 25 mcg (1,000 unit) capsule Take 1 capsule (1,000 Units total) by mouth once daily.  Qty: 30 capsule, Refills: 0    Associated Diagnoses: Vitamin D deficiency      latanoprost 0.005 % ophthalmic solution Place 1 drop into both eyes every evening.  Qty: 2.5 mL, Refills: 12    Associated Diagnoses: Primary open angle glaucoma of both eyes, mild stage      loratadine (CLARITIN) 10 mg tablet Take 1 tablet (10 mg total) by mouth once daily.  Qty: 1 Bottle, Refills: 0    Associated Diagnoses: Chronic rhinitis      pravastatin (PRAVACHOL) 80 MG tablet TAKE 1 TABLET(80 MG) BY MOUTH EVERY DAY  Qty: 30 tablet, Refills: 8    Associated Diagnoses: Mixed hyperlipidemia      sildenafiL (VIAGRA) 100 MG tablet Take 1 tablet (100 mg total) by mouth daily as needed for Erectile Dysfunction.  Qty: 10 tablet, Refills: 11    Associated Diagnoses: Erectile dysfunction, unspecified erectile dysfunction type      tamsulosin (FLOMAX) 0.4 mg Cap Take 1 capsule (0.4 mg total) by mouth once daily.  Qty: 90 capsule, Refills: 2    Associated Diagnoses: BPH with urinary obstruction                 Discharge Diagnosis: Chronic pain of left knee [M25.562, G89.29]  Condition on Discharge: Stable with no complications to procedure   Diet on Discharge: Same as before.  Activity:  as per instruction sheet.  Discharge to: Home with a responsible adult.  Follow up: 2-4 weeks       Please call my office or pager at [phone] if experienced any weakness or loss of sensation, fever > 101.5, pain uncontrolled with oral medications, persistent nausea/vomiting/or diarrhea, redness or drainage from the incisions, or any other worrisome concerns. If physician on call was not reached or could not communicate with our office for any reason please go to the nearest emergency department      Debby Javier  06/06/2023

## 2023-06-06 NOTE — DISCHARGE INSTRUCTIONS

## 2023-06-06 NOTE — H&P
HPI  Patient presenting for Procedure(s) (LRB):  RADIOFREQUENCY ABLATION, LEFT GENICULAR COOLED (Left)     Patient on Anti-coagulation No    No health changes since previous encounter    Past Medical History:   Diagnosis Date    Appendicitis     Benign neoplasm of colon     Cataract     Chronic rhinitis     Colon polyps     Glaucoma     Hyperlipidemia     IFG (impaired fasting glucose) 1/8/2016    Lumbar stenosis     Osteoarthritis of both knees 5/24/2018    Tubular adenoma of colon: 4/16 repeat 2021 4/7/2017     Past Surgical History:   Procedure Laterality Date    APPENDECTOMY      COLONOSCOPY  2007    repeat recommended in five years    COLONOSCOPY N/A 4/7/2016    Procedure: COLONOSCOPY;  Surgeon: Luis Yusuf MD;  Location: [facility] ENDO (4TH FLR);  Service: Endoscopy;  Laterality: N/A;  last procedure with Maciel, patient requesting very early appt    COLONOSCOPY N/A 7/8/2021    Procedure: COLONOSCOPY;  Surgeon: Austin Pettti MD;  Location: [facility] ENDO (4TH FLR);  Service: Endoscopy;  Laterality: N/A;  Fully vacc Covid-19 - pg     Review of patient's allergies indicates:  No Known Allergies   No current facility-administered medications for this encounter.       PMHx, PSHx, Allergies, Medications reviewed in epic    ROS negative except pain complaints in HPI    OBJECTIVE:    There were no vitals taken for this visit.    PHYSICAL EXAMINATION:    GENERAL: Well appearing, in no acute distress, alert and oriented x3.  PSYCH:  Mood and affect appropriate.  SKIN: Skin color, texture, turgor normal, no rashes or lesions which will impact the procedure.  CV: RRR with palpation of the radial artery.  PULM: No evidence of respiratory difficulty, symmetric chest rise. Clear to auscultation.  NEURO: Cranial nerves grossly intact.    Plan:    Proceed with procedure as planned Procedure(s) (LRB):  RADIOFREQUENCY ABLATION, LEFT GENICULAR COOLED (Left)    Debby Javier  06/06/2023

## 2023-06-06 NOTE — OP NOTE
Therapeutic Genicular Cooled Nerve Radiofrequency Ablation under Fluoroscopy     The procedure, risks, benefits, and options were discussed with the patient. There are no contraindications to the procedure. The patent expressed understanding and agreed to the procedure. Informed written consent was obtained prior to the start of the procedure and can be found in the patient's chart.        PATIENT NAME: Herman Chirinos   MRN: [id number]     DATE OF PROCEDURE: 06/06/2023     PROCEDURE: Therapeutic Left Genicular Cooled Nerve Radiofrequency Ablation under Fluoroscopy    PRE-OP DIAGNOSIS: Chronic pain of left knee [M25.562, G89.29]    POST-OP DIAGNOSIS: Chronic pain of left knee [M25.562, G89.29]    PHYSICIAN: Debby Javier MD    ASSISTANTS: Wong Tiwari MD Resident    MEDICATIONS INJECTED:  Preservative-free Kenalog 40mg with 9cc of Bupivicaine 0.25%    LOCAL ANESTHETIC INJECTED:   Xylocaine 2%    SEDATION: Versed 1mg and Fentanyl 50mcg                                                                                                                                                                                     Conscious sedation ordered by M.D. Patient re-evaluation prior to administration of conscious sedation. No changes noted in patient's status from initial evaluation. The patient's vital signs were monitored by RN and patient remained hemodynamically stable throughout the procedure.    Event Time In   Sedation Start 1413   Sedation End 1432       ESTIMATED BLOOD LOSS:  None    COMPLICATIONS:  None     INTERVAL HISTORY: Patient has clinical findings of chronic knee pain. Patients has completed 2 previous diagnostic genicular nerve blocks with at least 80% relief for the expected duration of the local anesthetic utilized.     TECHNIQUE: Time-out was performed to identify the patient and procedure to be performed. With the patient laying in a supine position, the surgical area was prepped and draped in the  usual sterile fashion using ChloraPrep and fenestrated drape. Three target sites including the superior lateral genicular nerve where the lateral femoral shaft meets the epicondyle, the superior medial genicular nerve where the medial femoral shaft meets the epicondyle, and the inferior medial genicular nerve where the medial tibial shaft meets the epicondyle, were determined under fluoroscopic guidance. Skin anesthesia was achieved by injecting Lidocaine 2% over the injection sites. A 17 gauge, 50mm, 10mm active tip needle was then advanced under fluoroscopy in the AP and lateral views into the positions of the geniculate nerves at these levels. This was followed by motor testing at each of the nerves to ensure that there was no dorsiflexion of the foot. After negative aspiration for blood was confirmed, 1 mL of the lidocaine 2% listed above was injected slowly at each site. This was followed by cooled thermal lesioning at 80 degrees celsius for 150 seconds at each site. That was followed by slowly injecting 1 mL of the medication mixture listed above at each site. The needles were removed and bleeding was nil. A sterile dressing was applied. No specimens collected. The patient tolerated the procedure well and did not have any procedure related motor deficit at the conclusion of the procedure.    The patient was monitored after the procedure in the recovery area. They were given post-procedure and discharge instructions to follow at home. The patient was discharged in a stable condition.      Debby Javier MD

## 2023-06-09 ENCOUNTER — PATIENT MESSAGE (OUTPATIENT)
Dept: RESEARCH | Facility: HOSPITAL | Age: 73
End: 2023-06-09
Payer: MEDICARE

## 2023-06-13 NOTE — PROCEDURES
"Herman Chirinos is a 68 y.o. male patient.    Temp: 98.3 °F (36.8 °C) (05/10/19 1106)  Pulse: 75 (05/10/19 1106)  BP: 109/73 (05/10/19 1106)  Weight: 96.6 kg (213 lb) (05/10/19 1106)  Height: 6' (182.9 cm) (05/10/19 1106)       Removal  Date/Time: 5/10/2019 12:38 PM  Performed by: Jerry Interiano MD  Authorized by: Jerry Interiano MD     Time out: Immediately prior to procedure a "time out" was called to verify the correct patient, procedure, equipment, support staff and site/side marked as required.    Consent Done?:  Yes (Written)    Type:  Cyst (lipoma x2)  Body area:  Trunk (mid chest and left hip)  Local anesthetic: lidocaine 1% without epinephrine  Scalpel size:  15  Incision type:  Single straight  Complexity:  Simple  Patient tolerance:  Patient tolerated the procedure well with no immediate complications    Left hip closed in multiple layers with 3-0 vicryl and 4-0 monocryl.  Chest closed with 4-0 monocryl.  Dressed with mastasol and steri stips.        Jerry Interiano  5/10/2019    " Pt informed.

## 2023-06-14 ENCOUNTER — OFFICE VISIT (OUTPATIENT)
Dept: OPTOMETRY | Facility: CLINIC | Age: 73
End: 2023-06-14
Payer: MEDICARE

## 2023-06-14 ENCOUNTER — CLINICAL SUPPORT (OUTPATIENT)
Dept: OPHTHALMOLOGY | Facility: CLINIC | Age: 73
End: 2023-06-14
Payer: MEDICARE

## 2023-06-14 DIAGNOSIS — H52.4 PRESBYOPIA: ICD-10-CM

## 2023-06-14 DIAGNOSIS — H25.13 NUCLEAR SCLEROSIS, BILATERAL: ICD-10-CM

## 2023-06-14 DIAGNOSIS — H40.013 OPEN ANGLE WITH BORDERLINE FINDINGS, LOW RISK, BILATERAL: Primary | ICD-10-CM

## 2023-06-14 DIAGNOSIS — Z13.5 GLAUCOMA SCREENING: ICD-10-CM

## 2023-06-14 PROCEDURE — 1126F PR PAIN SEVERITY QUANTIFIED, NO PAIN PRESENT: ICD-10-PCS | Mod: CPTII,S$GLB,, | Performed by: OPTOMETRIST

## 2023-06-14 PROCEDURE — 92015 DETERMINE REFRACTIVE STATE: CPT | Mod: S$GLB,,, | Performed by: OPTOMETRIST

## 2023-06-14 PROCEDURE — 99999 PR PBB SHADOW E&M-EST. PATIENT-LVL II: CPT | Mod: PBBFAC,,, | Performed by: OPTOMETRIST

## 2023-06-14 PROCEDURE — 92083 HUMPHREY VISUAL FIELD - OU - BOTH EYES: ICD-10-PCS | Mod: S$GLB,,, | Performed by: OPTOMETRIST

## 2023-06-14 PROCEDURE — 92133 POSTERIOR SEGMENT OCT OPTIC NERVE(OCULAR COHERENCE TOMOGRAPHY) - OU - BOTH EYES: ICD-10-PCS | Mod: S$GLB,,, | Performed by: OPTOMETRIST

## 2023-06-14 PROCEDURE — 1126F AMNT PAIN NOTED NONE PRSNT: CPT | Mod: CPTII,S$GLB,, | Performed by: OPTOMETRIST

## 2023-06-14 PROCEDURE — 1160F PR REVIEW ALL MEDS BY PRESCRIBER/CLIN PHARMACIST DOCUMENTED: ICD-10-PCS | Mod: CPTII,S$GLB,, | Performed by: OPTOMETRIST

## 2023-06-14 PROCEDURE — 92014 COMPRE OPH EXAM EST PT 1/>: CPT | Mod: S$GLB,,, | Performed by: OPTOMETRIST

## 2023-06-14 PROCEDURE — 92083 EXTENDED VISUAL FIELD XM: CPT | Mod: S$GLB,,, | Performed by: OPTOMETRIST

## 2023-06-14 PROCEDURE — 1101F PT FALLS ASSESS-DOCD LE1/YR: CPT | Mod: CPTII,S$GLB,, | Performed by: OPTOMETRIST

## 2023-06-14 PROCEDURE — 3288F PR FALLS RISK ASSESSMENT DOCUMENTED: ICD-10-PCS | Mod: CPTII,S$GLB,, | Performed by: OPTOMETRIST

## 2023-06-14 PROCEDURE — 92133 CPTRZD OPH DX IMG PST SGM ON: CPT | Mod: S$GLB,,, | Performed by: OPTOMETRIST

## 2023-06-14 PROCEDURE — 92014 PR EYE EXAM, EST PATIENT,COMPREHESV: ICD-10-PCS | Mod: S$GLB,,, | Performed by: OPTOMETRIST

## 2023-06-14 PROCEDURE — 99999 PR PBB SHADOW E&M-EST. PATIENT-LVL II: ICD-10-PCS | Mod: PBBFAC,,, | Performed by: OPTOMETRIST

## 2023-06-14 PROCEDURE — 1159F MED LIST DOCD IN RCRD: CPT | Mod: CPTII,S$GLB,, | Performed by: OPTOMETRIST

## 2023-06-14 PROCEDURE — 3288F FALL RISK ASSESSMENT DOCD: CPT | Mod: CPTII,S$GLB,, | Performed by: OPTOMETRIST

## 2023-06-14 PROCEDURE — 1159F PR MEDICATION LIST DOCUMENTED IN MEDICAL RECORD: ICD-10-PCS | Mod: CPTII,S$GLB,, | Performed by: OPTOMETRIST

## 2023-06-14 PROCEDURE — 92015 PR REFRACTION: ICD-10-PCS | Mod: S$GLB,,, | Performed by: OPTOMETRIST

## 2023-06-14 PROCEDURE — 1160F RVW MEDS BY RX/DR IN RCRD: CPT | Mod: CPTII,S$GLB,, | Performed by: OPTOMETRIST

## 2023-06-14 PROCEDURE — 1101F PR PT FALLS ASSESS DOC 0-1 FALLS W/OUT INJ PAST YR: ICD-10-PCS | Mod: CPTII,S$GLB,, | Performed by: OPTOMETRIST

## 2023-06-14 NOTE — Clinical Note
This patient used to work at [employer] with [employer], and was your patient in the past.  He wanted me to say hello to you both.  His cats are ready, so I am sending him to Dr HENRY

## 2023-06-14 NOTE — PROGRESS NOTES
HPI    73 Y/o male is here for routine eye exam with no C/o about ocular health    Pt denies pain and discomfort   No f/f    Eye med: Latanoprost QHS OU  Last edited by Marie Yates MA on 6/14/2023  8:37 AM.            Assessment /Plan     For exam results, see Encounter Report.    Open angle with borderline findings, low risk, bilateral  -     Mederos Visual Field - OU - Extended - Both Eyes  -     Posterior Segment OCT Optic Nerve- Both eyes    Nuclear sclerosis, bilateral    Glaucoma screening    Presbyopia      1. Cat OS>OD--pt wishes surgery  2. Hx glauc tx by Dr Bradshaw--iop wnl on meds.  Large CDs OD>>OS--see OCT (below normal OD w red T, yellow SI; RNFL wnl OS.  MRWA outside normal OD/wnl OS).  VF today wnl OU.   Pach: 503/510.  Angles open by gonio.        PLAN:    1. Cont LATANOPROST qhs OU for now.  Will evaluate need for meds after cat surgery  2. Surgical consult--Dr Jerome

## 2023-06-16 NOTE — PROGRESS NOTES
OCT/HVF done ou. Rel/fix/coop. Good ou./chart checked for latex allergy./ 1.00 + .50 x 170/od .75/os-smh

## 2023-07-27 ENCOUNTER — PATIENT MESSAGE (OUTPATIENT)
Dept: RESEARCH | Facility: HOSPITAL | Age: 73
End: 2023-07-27
Payer: MEDICARE

## 2023-08-28 ENCOUNTER — OFFICE VISIT (OUTPATIENT)
Dept: OPHTHALMOLOGY | Facility: CLINIC | Age: 73
End: 2023-08-28
Payer: MEDICARE

## 2023-08-28 DIAGNOSIS — H26.9 CORTICAL CATARACT OF BOTH EYES: ICD-10-CM

## 2023-08-28 DIAGNOSIS — H40.013 OPEN ANGLE WITH BORDERLINE FINDINGS, LOW RISK, BILATERAL: ICD-10-CM

## 2023-08-28 DIAGNOSIS — H25.13 NUCLEAR SCLEROSIS OF BOTH EYES: Primary | ICD-10-CM

## 2023-08-28 PROCEDURE — 1101F PT FALLS ASSESS-DOCD LE1/YR: CPT | Mod: CPTII,S$GLB,, | Performed by: OPHTHALMOLOGY

## 2023-08-28 PROCEDURE — 1125F AMNT PAIN NOTED PAIN PRSNT: CPT | Mod: CPTII,S$GLB,, | Performed by: OPHTHALMOLOGY

## 2023-08-28 PROCEDURE — 3288F FALL RISK ASSESSMENT DOCD: CPT | Mod: CPTII,S$GLB,, | Performed by: OPHTHALMOLOGY

## 2023-08-28 PROCEDURE — 1159F MED LIST DOCD IN RCRD: CPT | Mod: CPTII,S$GLB,, | Performed by: OPHTHALMOLOGY

## 2023-08-28 PROCEDURE — 1101F PR PT FALLS ASSESS DOC 0-1 FALLS W/OUT INJ PAST YR: ICD-10-PCS | Mod: CPTII,S$GLB,, | Performed by: OPHTHALMOLOGY

## 2023-08-28 PROCEDURE — 1160F RVW MEDS BY RX/DR IN RCRD: CPT | Mod: CPTII,S$GLB,, | Performed by: OPHTHALMOLOGY

## 2023-08-28 PROCEDURE — 92136 OPHTHALMIC BIOMETRY: CPT | Mod: RT,S$GLB,, | Performed by: OPHTHALMOLOGY

## 2023-08-28 PROCEDURE — 1125F PR PAIN SEVERITY QUANTIFIED, PAIN PRESENT: ICD-10-PCS | Mod: CPTII,S$GLB,, | Performed by: OPHTHALMOLOGY

## 2023-08-28 PROCEDURE — 99999 PR PBB SHADOW E&M-EST. PATIENT-LVL III: ICD-10-PCS | Mod: PBBFAC,,, | Performed by: OPHTHALMOLOGY

## 2023-08-28 PROCEDURE — 92136 BIOMETRY: ICD-10-PCS | Mod: RT,S$GLB,, | Performed by: OPHTHALMOLOGY

## 2023-08-28 PROCEDURE — 1159F PR MEDICATION LIST DOCUMENTED IN MEDICAL RECORD: ICD-10-PCS | Mod: CPTII,S$GLB,, | Performed by: OPHTHALMOLOGY

## 2023-08-28 PROCEDURE — 92004 PR EYE EXAM, NEW PATIENT,COMPREHESV: ICD-10-PCS | Mod: S$GLB,,, | Performed by: OPHTHALMOLOGY

## 2023-08-28 PROCEDURE — 3288F PR FALLS RISK ASSESSMENT DOCUMENTED: ICD-10-PCS | Mod: CPTII,S$GLB,, | Performed by: OPHTHALMOLOGY

## 2023-08-28 PROCEDURE — 92004 COMPRE OPH EXAM NEW PT 1/>: CPT | Mod: S$GLB,,, | Performed by: OPHTHALMOLOGY

## 2023-08-28 PROCEDURE — 99999 PR PBB SHADOW E&M-EST. PATIENT-LVL III: CPT | Mod: PBBFAC,,, | Performed by: OPHTHALMOLOGY

## 2023-08-28 PROCEDURE — 1160F PR REVIEW ALL MEDS BY PRESCRIBER/CLIN PHARMACIST DOCUMENTED: ICD-10-PCS | Mod: CPTII,S$GLB,, | Performed by: OPHTHALMOLOGY

## 2023-08-28 NOTE — PROGRESS NOTES
Subjective:       Patient ID: Herman Chirinos is a 73 y.o. male.    Chief Complaint: Cataract    HPI    Cataract Eval OU    DLS 06/14/2023 by Dr. Duy Beckett, OD    CC: pt states vision is not as clear and blurry OD > OS    -blurred Va  -eye pain  -diplopia  -headaches    Eye Meds: Latanaprost OU qhs    POHx:   1. OAG w/ borderline finding  2. NSC OU  3. Glaucoma Screening  Last edited by Rosa Ballard MA on 8/28/2023  8:56 AM.             Assessment:       1. Nuclear sclerosis of both eyes    2. Cortical cataract of both eyes    3. Open angle with borderline findings, low risk, bilateral        Plan:       Visually significant cataract OD -Pt. Wants Sx.     Cataract OS- Not visually significant.  Glaucoma suspect OU-IOP's are acceptable OU & ON's appear stable OU.      Cataract Surgery Consent: Patient with a visually significant cataract with difficulties of ADLs, reading, driving, night vision, glare (any and all).  Discussed with Patient/Family/Caregiver: options, risks and benefits, expectations of cataract surgery, utilized an eye model with questions and answers to facilitate discussion.  Discussed lens options and patient understands that glasses may be required for optimal vision for distance and/or near vision after cataract surgery.  The Patient/Family/Caregiver  voice good understanding and patient wishes to proceed with surgery.  The patient will likely benefit from surgery and patient signed consent for Right Eye.   Will call Pt to schedule CE OD CNAOTO 21.0.  CPM OU.

## 2023-08-30 ENCOUNTER — TELEPHONE (OUTPATIENT)
Dept: OPHTHALMOLOGY | Facility: CLINIC | Age: 73
End: 2023-08-30
Payer: MEDICARE

## 2023-08-30 DIAGNOSIS — H25.11 NS (NUCLEAR SCLEROSIS), RIGHT: Primary | ICD-10-CM

## 2023-09-03 DIAGNOSIS — H25.13 NUCLEAR SCLEROTIC CATARACT OF BOTH EYES: Primary | ICD-10-CM

## 2023-09-03 DIAGNOSIS — N13.8 BPH WITH URINARY OBSTRUCTION: ICD-10-CM

## 2023-09-03 DIAGNOSIS — E78.2 MIXED HYPERLIPIDEMIA: ICD-10-CM

## 2023-09-03 DIAGNOSIS — N40.1 BPH WITH URINARY OBSTRUCTION: ICD-10-CM

## 2023-09-03 RX ORDER — KETOROLAC TROMETHAMINE 5 MG/ML
1 SOLUTION OPHTHALMIC 4 TIMES DAILY
Qty: 5 ML | Refills: 1 | Status: SHIPPED | OUTPATIENT
Start: 2023-09-30 | End: 2023-10-30

## 2023-09-03 RX ORDER — OFLOXACIN 3 MG/ML
1 SOLUTION/ DROPS OPHTHALMIC 4 TIMES DAILY
Qty: 5 ML | Refills: 1 | Status: SHIPPED | OUTPATIENT
Start: 2023-09-30 | End: 2023-10-10

## 2023-09-03 RX ORDER — PREDNISOLONE ACETATE 10 MG/ML
1 SUSPENSION/ DROPS OPHTHALMIC 4 TIMES DAILY
Qty: 5 ML | Refills: 1 | Status: SHIPPED | OUTPATIENT
Start: 2023-10-03 | End: 2023-11-02

## 2023-09-03 NOTE — TELEPHONE ENCOUNTER
Care Due:                  Date            Visit Type   Department     Provider  --------------------------------------------------------------------------------                                EP -                              PRIMARY      [facility]  Last Visit: 10-      CARE ([facility])   MEDICINE       Avila Toledo  Next Visit: None Scheduled  None         None Found                                                            Last  Test          Frequency    Reason                     Performed    Due Date  --------------------------------------------------------------------------------    Office Visit  12 months..  pravastatin, sildenafiL,   10-   10-                             tamsulosin...............    CMP.........  12 months..  pravastatin..............  10-   10-    Lipid Panel.  12 months..  pravastatin..............  10-   10-    [employer] Embedded Care Due Messages. Reference number: [id number].   9/03/2023 5:57:27 AM CDT

## 2023-09-04 RX ORDER — TAMSULOSIN HYDROCHLORIDE 0.4 MG/1
0.4 CAPSULE ORAL
Qty: 90 CAPSULE | Refills: 0 | Status: SHIPPED | OUTPATIENT
Start: 2023-09-04 | End: 2024-01-30

## 2023-09-04 RX ORDER — PRAVASTATIN SODIUM 80 MG/1
TABLET ORAL
Qty: 90 TABLET | Refills: 0 | Status: SHIPPED | OUTPATIENT
Start: 2023-09-04 | End: 2024-02-12

## 2023-09-04 NOTE — TELEPHONE ENCOUNTER
Provider Staff:  Action required for this patient     Please see care gap opportunities below in Care Due Message.    Thanks!  [facility]     Appointments      Date Provider   Last Visit   10/14/2022 Avila Toledo MD   Next Visit   Visit date not found Avila Toledo MD      Refill Decision Note   Herman Chirinos  is requesting a refill authorization.  Brief Assessment and Rationale for Refill:  Approve     Medication Therapy Plan:         Comments:     Note composed:3:54 AM 09/04/2023

## 2023-09-27 ENCOUNTER — TELEPHONE (OUTPATIENT)
Dept: OPHTHALMOLOGY | Facility: CLINIC | Age: 73
End: 2023-09-27
Payer: MEDICARE

## 2023-09-29 NOTE — PRE-PROCEDURE INSTRUCTIONS
- Nothing to eat or drink after midinight, except AM meds with small sips of water, Gatorade/Powerade  - Hold all Diabetic meds AM of surgery  - Hold all Insulin AM of surgery  - Hold all Fluid pills AM of surgery  - Hold all non-insulin shots until after surgery (Ozempic, Mounjaro, Trulicity, Victoza, Byetta, Wegovy and Adlyxin) (up to 7 days prior)  - Take all B/P meds, except those that contain a fluid pill  - Hold all vits and herbal meds AM of surgery  - Use inhalers as needed and bring AM of surgery  - Take blood thinner meds AM of surgery  - Use eye drops as directed  - Shower and wash face with dial soap for 3 mins PM prior and AM of surgery  - No powder, lotions, creams, (makeup),  or jewelry    - Wear comfortable clothing (button up shirt)    (Patients ride may not leave while patient is in surgery)    -- 2nd floor surgery ctr at [facility] @ [address] [place] [address]       Pt voiced understanding

## 2023-09-30 NOTE — H&P
[facility]  History & Physical    Subjective:      Chief Complaint/Reason for Admission:     Herman Chirinos is a 73 y.o. male.    Past Medical History:   Diagnosis Date    Appendicitis     Benign neoplasm of colon     Cataract     Chronic rhinitis     Colon polyps     Glaucoma     Hyperlipidemia     IFG (impaired fasting glucose) 1/8/2016    Lumbar stenosis     Osteoarthritis of both knees 5/24/2018    Tubular adenoma of colon: 4/16 repeat 2021 4/7/2017     Past Surgical History:   Procedure Laterality Date    APPENDECTOMY      COLONOSCOPY  2007    repeat recommended in five years    COLONOSCOPY N/A 4/7/2016    Procedure: COLONOSCOPY;  Surgeon: Luis Yusuf MD;  [facility]  Service: Endoscopy;  Laterality: N/A;  last procedure with Maciel, patient requesting very early appt    COLONOSCOPY N/A 7/8/2021    Procedure: COLONOSCOPY;  Surgeon: Austin Pettit MD;  [facility]  Service: Endoscopy;  Laterality: N/A;  Fully vacc Covid-19 - pg    RADIOFREQUENCY ABLATION Left 6/6/2023    Procedure: RADIOFREQUENCY ABLATION, LEFT GENICULAR COOLED;  Surgeon: Debby Javier MD;  Location: [facility];  Service: Pain Management;  Laterality: Left;     Family History   Problem Relation Age of Onset    Coronary artery disease Mother     Hypertension Mother     Heart disease Mother         heart transplant    Diabetes Mellitus Father     Diabetes Father     Kidney disease Sister     No Known Problems Sister     Kidney disease Brother     Cancer Brother         bladder ca    No Known Problems Brother     Cancer Daughter         br ca, CR    No Known Problems Daughter     No Known Problems Daughter     No Known Problems Son     No Known Problems Son     No Known Problems Son     No Known Problems Maternal Aunt     No Known Problems Maternal Uncle     No Known Problems Paternal Aunt     No Known Problems Paternal Uncle     No Known Problems  Maternal Grandmother     No Known Problems Maternal Grandfather     No Known Problems Paternal Grandmother     No Known Problems Paternal Grandfather     Stroke Neg Hx     Glaucoma Neg Hx     Cataracts Neg Hx     Blindness Neg Hx     Amblyopia Neg Hx     Macular degeneration Neg Hx     Retinal detachment Neg Hx     Strabismus Neg Hx     Thyroid disease Neg Hx     Psoriasis Neg Hx     Tremor Neg Hx     Parkinsonism Neg Hx      Social History     Tobacco Use    Smoking status: Never    Smokeless tobacco: Never   Substance Use Topics    Alcohol use: No    Drug use: No       No medications prior to admission.     Review of patient's allergies indicates:  No Known Allergies     Review of Systems   Eyes:  Positive for blurred vision.   All other systems reviewed and are negative.      Objective:      Vital Signs (Most Recent)       Vital Signs Range (Last 24H):       Physical Exam  Constitutional:       Appearance: He is well-developed.   HENT:      Head: Normocephalic.   Eyes:      Conjunctiva/sclera: Conjunctivae normal.      Pupils: Pupils are equal, round, and reactive to light.   Cardiovascular:      Rate and Rhythm: Normal rate.   Pulmonary:      Effort: Pulmonary effort is normal.      Breath sounds: Normal breath sounds.   Abdominal:      General: Bowel sounds are normal.      Palpations: Abdomen is soft.   Musculoskeletal:         General: Normal range of motion.      Cervical back: Normal range of motion and neck supple.   Skin:     General: Skin is warm.   Neurological:      Mental Status: He is alert and oriented to person, place, and time.         Data Review:     ECG:     Assessment:      Cataract OD.    Plan:    CE OD.

## 2023-10-03 ENCOUNTER — ANESTHESIA EVENT (OUTPATIENT)
Dept: SURGERY | Facility: HOSPITAL | Age: 73
End: 2023-10-03
Payer: MEDICARE

## 2023-10-03 ENCOUNTER — ANESTHESIA (OUTPATIENT)
Dept: SURGERY | Facility: HOSPITAL | Age: 73
End: 2023-10-03
Payer: MEDICARE

## 2023-10-03 ENCOUNTER — HOSPITAL ENCOUNTER (OUTPATIENT)
Facility: HOSPITAL | Age: 73
Discharge: HOME | End: 2023-10-03
Attending: OPHTHALMOLOGY | Admitting: OPHTHALMOLOGY
Payer: MEDICARE

## 2023-10-03 VITALS
BODY MASS INDEX: 28.99 KG/M2 | HEIGHT: 72 IN | RESPIRATION RATE: 18 BRPM | WEIGHT: 214 LBS | TEMPERATURE: 98 F | SYSTOLIC BLOOD PRESSURE: 127 MMHG | OXYGEN SATURATION: 96 % | DIASTOLIC BLOOD PRESSURE: 82 MMHG | HEART RATE: 52 BPM

## 2023-10-03 DIAGNOSIS — H25.11 NUCLEAR SCLEROTIC CATARACT OF RIGHT EYE: Primary | ICD-10-CM

## 2023-10-03 PROCEDURE — 37000009 HC ANESTHESIA EA ADD 15 MINS: Performed by: OPHTHALMOLOGY

## 2023-10-03 PROCEDURE — 25000003 PHARM REV CODE 250: Performed by: OPHTHALMOLOGY

## 2023-10-03 PROCEDURE — D9220A PRA ANESTHESIA: Mod: ,,, | Performed by: REGISTERED NURSE

## 2023-10-03 PROCEDURE — 36000707: Performed by: OPHTHALMOLOGY

## 2023-10-03 PROCEDURE — 66982 XCAPSL CTRC RMVL CPLX WO ECP: CPT | Mod: RT,,, | Performed by: OPHTHALMOLOGY

## 2023-10-03 PROCEDURE — 27201423 OPTIME MED/SURG SUP & DEVICES STERILE SUPPLY: Performed by: OPHTHALMOLOGY

## 2023-10-03 PROCEDURE — 66982 PR REMOVAL, CATARACT, W/INSRT INTRAOC LENS, W/O ENDO CYCLO, CMPLX: ICD-10-PCS | Mod: RT,,, | Performed by: OPHTHALMOLOGY

## 2023-10-03 PROCEDURE — 94761 N-INVAS EAR/PLS OXIMETRY MLT: CPT

## 2023-10-03 PROCEDURE — V2632 POST CHMBR INTRAOCULAR LENS: HCPCS | Performed by: OPHTHALMOLOGY

## 2023-10-03 PROCEDURE — 71000016 HC POSTOP RECOV ADDL HR: Performed by: OPHTHALMOLOGY

## 2023-10-03 PROCEDURE — 99900035 HC TECH TIME PER 15 MIN (STAT)

## 2023-10-03 PROCEDURE — 63600175 PHARM REV CODE 636 W HCPCS: Performed by: REGISTERED NURSE

## 2023-10-03 PROCEDURE — 71000015 HC POSTOP RECOV 1ST HR: Performed by: OPHTHALMOLOGY

## 2023-10-03 PROCEDURE — 36000706: Performed by: OPHTHALMOLOGY

## 2023-10-03 PROCEDURE — D9220A PRA ANESTHESIA: ICD-10-PCS | Mod: ,,, | Performed by: REGISTERED NURSE

## 2023-10-03 PROCEDURE — 37000008 HC ANESTHESIA 1ST 15 MINUTES: Performed by: OPHTHALMOLOGY

## 2023-10-03 PROCEDURE — 63600175 PHARM REV CODE 636 W HCPCS: Performed by: OPHTHALMOLOGY

## 2023-10-03 DEVICE — LENS CLAREON AUTONOME 21.0D: Type: IMPLANTABLE DEVICE | Site: EYE | Status: FUNCTIONAL

## 2023-10-03 RX ORDER — SODIUM CHLORIDE 9 MG/ML
INJECTION, SOLUTION INTRAVENOUS CONTINUOUS
Status: DISCONTINUED | OUTPATIENT
Start: 2023-10-03 | End: 2023-10-03 | Stop reason: HOSPADM

## 2023-10-03 RX ORDER — TETRACAINE HYDROCHLORIDE 5 MG/ML
SOLUTION OPHTHALMIC
Status: DISCONTINUED | OUTPATIENT
Start: 2023-10-03 | End: 2023-10-03 | Stop reason: HOSPADM

## 2023-10-03 RX ORDER — LIDOCAINE HYDROCHLORIDE 40 MG/ML
INJECTION, SOLUTION RETROBULBAR
Status: DISCONTINUED | OUTPATIENT
Start: 2023-10-03 | End: 2023-10-03 | Stop reason: HOSPADM

## 2023-10-03 RX ORDER — MIDAZOLAM HYDROCHLORIDE 1 MG/ML
INJECTION, SOLUTION INTRAMUSCULAR; INTRAVENOUS
Status: DISCONTINUED | OUTPATIENT
Start: 2023-10-03 | End: 2023-10-03

## 2023-10-03 RX ORDER — MOXIFLOXACIN 5 MG/ML
SOLUTION/ DROPS OPHTHALMIC
Status: DISCONTINUED | OUTPATIENT
Start: 2023-10-03 | End: 2023-10-03 | Stop reason: HOSPADM

## 2023-10-03 RX ORDER — PREDNISOLONE ACETATE 10 MG/ML
SUSPENSION/ DROPS OPHTHALMIC
Status: DISCONTINUED | OUTPATIENT
Start: 2023-10-03 | End: 2023-10-03 | Stop reason: HOSPADM

## 2023-10-03 RX ORDER — HYDROCODONE BITARTRATE AND ACETAMINOPHEN 5; 325 MG/1; MG/1
1 TABLET ORAL EVERY 4 HOURS PRN
Status: DISCONTINUED | OUTPATIENT
Start: 2023-10-03 | End: 2023-10-03 | Stop reason: HOSPADM

## 2023-10-03 RX ORDER — ONDANSETRON 2 MG/ML
4 INJECTION INTRAMUSCULAR; INTRAVENOUS DAILY PRN
Status: DISCONTINUED | OUTPATIENT
Start: 2023-10-03 | End: 2023-10-03 | Stop reason: HOSPADM

## 2023-10-03 RX ORDER — ACETAMINOPHEN 325 MG/1
650 TABLET ORAL EVERY 4 HOURS PRN
Status: DISCONTINUED | OUTPATIENT
Start: 2023-10-03 | End: 2023-10-03 | Stop reason: HOSPADM

## 2023-10-03 RX ORDER — CYCLOP/TROP/PROPA/PHEN/KET/WAT 1-1-0.1%
1 DROPS (EA) OPHTHALMIC (EYE)
Status: COMPLETED | OUTPATIENT
Start: 2023-10-03 | End: 2023-10-03

## 2023-10-03 RX ORDER — MOXIFLOXACIN 5 MG/ML
1 SOLUTION/ DROPS OPHTHALMIC
Status: COMPLETED | OUTPATIENT
Start: 2023-10-03 | End: 2023-10-03

## 2023-10-03 RX ORDER — FENTANYL CITRATE 50 UG/ML
INJECTION, SOLUTION INTRAMUSCULAR; INTRAVENOUS
Status: DISCONTINUED | OUTPATIENT
Start: 2023-10-03 | End: 2023-10-03

## 2023-10-03 RX ADMIN — MIDAZOLAM HYDROCHLORIDE 1 MG: 1 INJECTION, SOLUTION INTRAMUSCULAR; INTRAVENOUS at 01:10

## 2023-10-03 RX ADMIN — ACETAMINOPHEN 650 MG: 325 TABLET ORAL at 02:10

## 2023-10-03 RX ADMIN — MOXIFLOXACIN OPHTHALMIC 1 DROP: 5 SOLUTION/ DROPS OPHTHALMIC at 12:10

## 2023-10-03 RX ADMIN — Medication 1 DROP: at 12:10

## 2023-10-03 RX ADMIN — FENTANYL CITRATE 50 MCG: 50 INJECTION, SOLUTION INTRAMUSCULAR; INTRAVENOUS at 02:10

## 2023-10-03 RX ADMIN — FENTANYL CITRATE 50 MCG: 50 INJECTION, SOLUTION INTRAMUSCULAR; INTRAVENOUS at 01:10

## 2023-10-03 NOTE — ANESTHESIA PREPROCEDURE EVALUATION
[facility]  Anesthesia Pre-Operative Evaluation         Patient Name: Herman Chirinos  YOB: 1950  MRN: [id number]    SUBJECTIVE:     10/03/2023    Procedure(s) (LRB):  EXTRACTION, CATARACT, WITH IOL INSERTION (Right)    Herman Chirinos is a 73 y.o. male here for Procedure(s) (LRB):  EXTRACTION, CATARACT, WITH IOL INSERTION (Right)    Drips:     Patient Active Problem List   Diagnosis    Chronic rhinitis    Mixed hyperlipidemia    POAG (primary open-angle glaucoma) - Both Eyes    Dry eyes - Both Eyes    Nuclear sclerosis    Erectile dysfunction    Agatston coronary artery calcium score less than 100    IFG (impaired fasting glucose)    Overweight (BMI 25.0-29.9)    Tubular adenoma of colon: 4/16 repeat 2021    Benign nodular prostatic hyperplasia without lower urinary tract symptoms    Spinal stenosis of lumbar region: see MRI 2006    Osteoarthritis of both knees    Dizziness    Prediabetes    Vitamin D deficiency    Cervical radiculopathy    Subcutaneous mass    Tremor    Micturition syncope    Encounter for screening colonoscopy       Review of patient's allergies indicates:  No Known Allergies    No current facility-administered medications on file prior to encounter.     Current Outpatient Medications on File Prior to Encounter   Medication Sig Dispense Refill    aspirin 81 MG Chew Take 1 tablet (81 mg total) by mouth once daily. 30 tablet 12    cholecalciferol, vitamin D3, (VITAMIN D3) 25 mcg (1,000 unit) capsule Take 1 capsule (1,000 Units total) by mouth once daily. 30 capsule 0    latanoprost 0.005 % ophthalmic solution Place 1 drop into both eyes every evening. 2.5 mL 12    loratadine (CLARITIN) 10 mg tablet Take 1 tablet (10 mg total) by mouth once daily. 1 Bottle 0    sildenafiL (VIAGRA) 100 MG tablet Take 1 tablet (100 mg total) by mouth daily as needed for Erectile Dysfunction. 10 tablet 11       Past Surgical History:   Procedure Laterality Date     APPENDECTOMY      COLONOSCOPY  2007    repeat recommended in five years    COLONOSCOPY N/A 4/7/2016    Procedure: COLONOSCOPY;  Surgeon: Luis Yusuf MD;  Location: [facility] ([facility]);  Service: Endoscopy;  Laterality: N/A;  last procedure with Maciel, patient requesting very early appt    COLONOSCOPY N/A 7/8/2021    Procedure: COLONOSCOPY;  Surgeon: Austin Pettit MD;  [facility]  Service: Endoscopy;  Laterality: N/A;  Fully vacc Covid-19 - pg    RADIOFREQUENCY ABLATION Left 6/6/2023    Procedure: RADIOFREQUENCY ABLATION, LEFT GENICULAR COOLED;  Surgeon: Debby Javier MD;  Location: [facility] PAIN [facility]GT;  Service: Pain Management;  Laterality: Left;       Social History     Socioeconomic History    Marital status:     Number of children: 6   Occupational History    Occupation: [employer]     Employer: [employer]     Comment: [employer]   Tobacco Use    Smoking status: Never    Smokeless tobacco: Never   Substance and Sexual Activity    Alcohol use: No    Drug use: No    Sexual activity: Yes     Partners: Female   Social History Narrative     2020, lives alone. 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 Has a Cheondoism life. Active with yard work, walks. Prev work as  at [employer].     Social Determinants of Health     Financial Resource Strain: Medium Risk (10/13/2021)    Overall Financial Resource Strain (CARDIA)     Difficulty of Paying Living Expenses: Somewhat hard   Food Insecurity: No Food Insecurity (10/13/2021)    Hunger Vital Sign     Worried About Running Out of Food in the Last Year: Never true     Ran Out of Food in the Last Year: Never true   Transportation Needs: No Transportation Needs (10/13/2021)    PRAPARE - Transportation     Lack of Transportation (Medical): No     Lack of Transportation (Non-Medical): No   Physical Activity: Insufficiently Active (10/13/2021)    Exercise Vital Sign     Days of Exercise per Week: 3 days     Minutes  of Exercise per Session: 30 min   Stress: Stress Concern Present (10/13/2021)    [place] [employer] - Occupational Stress Questionnaire     Feeling of Stress : To some extent   Social Connections: Moderately Integrated (10/13/2021)    Social Connection and Isolation Panel [NHANES]     Frequency of Communication with Friends and Family: More than three times a week     Frequency of Social Gatherings with Friends and Family: More than three times a week     Attends Rastafarian Services: More than 4 times per year     Active Member of Clubs or Organizations: Yes     Attends Club or Organization Meetings: More than 4 times per year     Marital Status:    Housing Stability: Low Risk  (10/13/2021)    Housing Stability Vital Sign     Unable to Pay for Housing in the Last Year: No     Number of Places Lived in the Last Year: 1     Unstable Housing in the Last Year: No         OBJECTIVE:     Vital Signs Range (Last 24H):       Significant Labs:  Lab Results   Component Value Date    WBC 6.33 10/14/2022    HGB 14.3 10/14/2022    HCT 43.3 10/14/2022     10/14/2022    CHOL 171 10/14/2022    TRIG 70 10/14/2022    HDL 52 10/14/2022    ALT 16 10/14/2022    AST 23 10/14/2022     10/14/2022    K 3.8 10/14/2022     10/14/2022    CREATININE 0.9 10/14/2022    BUN 13 10/14/2022    CO2 28 10/14/2022    TSH 0.684 05/04/2021    PSA 3.3 11/02/2020    HGBA1C 5.7 (H) 10/14/2022       Diagnostic Studies:    EKG:   Results for orders placed or performed during the hospital encounter of 01/08/16   EKG 12-lead    Collection Time: 01/08/16 12:42 PM    Narrative    Test Reason : Z00.00  Vent. Rate : 059 BPM     Atrial Rate : 059 BPM     P-R Int : 176 ms          QRS Dur : 094 ms      QT Int : 396 ms       P-R-T Axes : 064 -15 -05 degrees     QTc Int : 392 ms    Sinus bradycardia  Otherwise normal ECG  When compared with ECG of 07-OCT-2011 10:37,  Borderline criteria for Anterior infarct are no  longer Present    Confirmed by Shonda Ellis MD ([id number]) on 1/8/2016 3:28:47 PM    Referred By: NETO WHITMORE           Confirmed By:Shonda Ellis MD         Pre-op Assessment    I have reviewed the Patient Summary Reports.     I have reviewed the Nursing Notes. I have reviewed the NPO Status.   I have reviewed the Medications.     Review of Systems  Anesthesia Hx:  No problems with previous Anesthesia  History of prior surgery of interest to airway management or planning:  Denies Personal Hx of Anesthesia complications.   Social:  Non-Smoker, No Alcohol Use    Cardiovascular:   Exercise tolerance: good Denies Hypertension.  hyperlipidemia    Pulmonary:  Pulmonary Normal  Denies COPD.  Denies Asthma.  Denies Sleep Apnea.    Renal/:   BPH    Hepatic/GI:   Denies GERD. Denies Liver Disease.    Musculoskeletal:   Arthritis   Spine Disorders: cervical    Neurological:   Denies TIA. Denies CVA. Neuromuscular Disease,  Denies Seizures.    Endocrine:  Endocrine Normal Denies Diabetes. Denies Hypothyroidism.  Denies Hyperthyroidism.        Physical Exam  General: Well nourished, Cooperative, Alert and Oriented    Airway:  Mallampati: II   Mouth Opening: Normal  TM Distance: Normal  Tongue: Normal    Dental:  Dentures, Partial Dentures  Upper partial  Lower full denture       Anesthesia Plan  Type of Anesthesia, risks & benefits discussed:    Anesthesia Type: MAC  Intra-op Monitoring Plan: Standard ASA Monitors  Post Op Pain Control Plan: multimodal analgesia  Induction:  IV  Informed Consent: Informed consent signed with the Patient and all parties understand the risks and agree with anesthesia plan.  All questions answered.   ASA Score: 2  Day of Surgery Review of History & Physical: H&P Update referred to the surgeon/provider.    Ready For Surgery From Anesthesia Perspective.     .

## 2023-10-03 NOTE — PLAN OF CARE
Discharge instructions given to patient/  daughter and both verbalized understanding of all.  VSS, denies n/v and tolerating PO, rates pain level tolerable. IV removed, and family notified for patient discharge home.

## 2023-10-03 NOTE — BRIEF OP NOTE
[facility]  Brief Operative Note    Surgery Date: 10/3/2023     Surgeon(s) and Role:     * Riley Dougherty MD - Primary    Assisting Surgeon: None    Pre-op Diagnosis:  NS (nuclear sclerosis), right [H25.11]    Post-op Diagnosis:  Post-Op Diagnosis Codes:     * NS (nuclear sclerosis), right [H25.11]    Procedure(s) (LRB):  EXTRACTION, CATARACT, WITH IOL INSERTION (Right)    Anesthesia: Local MAC    Operative Findings:     Estimated Blood Loss: * No values recorded between 10/3/2023  1:49 PM and 10/3/2023  2:23 PM *         Specimens:   Specimen (24h ago, onward)      None              Discharge Note    OUTCOME: Patient tolerated treatment/procedure well without complication and is now ready for discharge.    DISPOSITION: Home or Self Care    FINAL DIAGNOSIS:  Nuclear sclerotic cataract of right eye    FOLLOWUP: In clinic    DISCHARGE INSTRUCTIONS:    Discharge Procedure Orders   Other restrictions (specify):   Order Comments: No heavy lifting or bending for 1 week.

## 2023-10-03 NOTE — DISCHARGE INSTRUCTIONS
Cataract Post Surgery Instructions     START YOUR DROPS AS SOON AS YOU GET HOME FROM SURGERY      Instill the following drops 1 drop, three (3) times daily, every four (4) hours.      FIVE MINUTES APART FROM EACH OTHER      OFLOXACIN  ( Tan Top)    KETOROLAC  ( Gray Top)    PREDNISOLONE ACETATE  ( Clyde or White Top)         RESTRICTIONS FOR SEVEN (7) DAYS FOLLOWING SURGERY     DO NOT lift anything over 10 pounds.     DO NOT bend at the waist, only at the knees.      DO NOT rub your eye.      DO NOT get any water into the eye.      DO NOT wear any makeup, lotions, or creams on/around the eye.     Wear the eye shield you were given after surgery anytime you go to sleep.  You may remove the shield while awake.           NOTE:     YOU MAY resume taking ALL your medications after surgery.     YOU MAY resume driving on your first post-operative appointment IF your vision is clear. DO NOT wear your eye shield while driving for your post operative appointments.      YOU MAY take an over-the counter pain medication such as Tylenol or Ibuprofen as needed for pain.     CALL US:  MILD DISCOMFORT IS NORMAL. HOWEVER, IF YOU EXPERIENCE SEVERE THROBBING PAIN, LOSS OF VISION, ONSET OF FLASHES AND/OR FLOATERS- CALL DR. CARTER OFFICE: [phone]/ AFTER HOUR [phone] OR PROCEED TO THE EMERGENCY ROOM.

## 2023-10-03 NOTE — OP NOTE
Operative Date:  10/03/2023    Discharge Date:  10/03/2023      Discharge Patient Home        Report Title: Operative Note      SURGEON: Riley Dougherty MD     ASSISTANT:     PREOPERATIVE DIAGNOSIS: Visually significant NSC cataract with poor dilation, Right Eye      POSTOPERATIVE DIAGNOSIS: Visually-significant NSC cataract with poor dilation, Right Eye      PROCEDURE PERFORMED: Complex Phacoemulsification of the cataract with posterior chamber intraocular lens with Malyugin Ring Right Eye      ANESTHESIA: Topical with MAC     COMPLICATIONS: None.    ESTIMATED BLOOD LOSS: Minimal    INDICATIONS FOR PROCEDURE:   The patient is a pleasant 73 year old gentleman with increasing difficulties with activities of daily living secondary to a dense visually significant cataract in the Right Eye.  Discussions have been carried out with this patient concerning the options to surgery, risks, benefits and expectations.  The patient voiced good understanding and wished to proceed with the above procedure.    PROCEDURE IN DETAIL: The patient was brought to the operating room and received topical anesthetic to the eye and then was prepped and draped in the usual sterile fashion.  Using the Alcaraz ring and the 0.37 mm guarded kwame blade, a partial thickness clear cornea incision was made temporally.  A paracentesis site was made at the twelve o'clock position.  Omidria was injected into the anterior chamber through the paracentesis site. Viscoat was injected into the anterior chamber.  The eye was then reentered at the primary surgical site with a 2.4 mm keratome followed by placement of a 6.25 Malyugin ring, continuous capsulotomy, hydrodissection, hydrodelineation and phacoemulsification of the cataract.  Residual cortical material was removed using automated irrigation-aspiration technique.  Healon was injected into the posterior chamber and a CNAOTO 21.0 diopter lens was placed in the bag without difficulty.  The  Malyugin ring and residual Viscoat were removed using automated irrigation-aspiration technique.  The eye was re-pressurized using BSS solution and both the paracentesis site and the primary surgical site were demonstrated to be watertight at the end of the case with Weck--Rossana manipulation. A drop of Ofloxacin and Pred acetate was applied to the Right Eye. The eye was closed, patched and a Reyes shield placed. The patient was taken to the recovery room in good and stable condition.  The patient tolerated the procedure well.  The patient was instructed to refrain from any heavy lifting, bending, stooping or straining activities, discharged home and to follow-up in the morning for routine postoperative care with Riley Dougherty MD

## 2023-10-03 NOTE — TRANSFER OF CARE
Anesthesia Transfer of Care Note    Patient: Herman Chirinos    Procedure(s) Performed: Procedure(s) (LRB):  EXTRACTION, CATARACT, WITH IOL INSERTION (Right)    Patient location: PACU    Anesthesia Type: MAC    Transport from OR: Transported from OR on room air with adequate spontaneous ventilation    Post pain: adequate analgesia    Post assessment: no apparent anesthetic complications and tolerated procedure well    Post vital signs: stable    Level of consciousness: awake    Nausea/Vomiting: no nausea/vomiting    Complications: none    Transfer of care protocol was followed      Last vitals:   Visit Vitals  BP (!) 146/79 (BP Location: Right arm, Patient Position: Lying)   Pulse 68   Temp 36.8 °C (98.2 °F) (Temporal)   Resp 16   Ht 6' (1.829 m)   Wt 97.1 kg (214 lb)   SpO2 100%   BMI 29.02 kg/m²

## 2023-10-03 NOTE — ANESTHESIA POSTPROCEDURE EVALUATION
Anesthesia Post Evaluation    Patient: Herman Chirinos    Procedure(s) Performed: Procedure(s) (LRB):  EXTRACTION, CATARACT, WITH IOL INSERTION (Right)    Final Anesthesia Type: MAC      Patient location during evaluation: PACU  Patient participation: Yes- Able to Participate  Level of consciousness: awake  Post-procedure vital signs: reviewed and stable  Pain management: adequate  Airway patency: patent    PONV status at discharge: No PONV  Anesthetic complications: no      Cardiovascular status: blood pressure returned to baseline, stable and bradycardic  Respiratory status: unassisted  Hydration status: euvolemic  Follow-up not needed.          Vitals Value Taken Time   /82 10/03/23 1517   Temp 36.9 °C (98.4 °F) 10/03/23 1425   Pulse 49 10/03/23 1521   Resp 28 10/03/23 1521   SpO2 96 % 10/03/23 1521   Vitals shown include unvalidated device data.      No case tracking events are documented in the log.      Pain/Rafiq Score: Pain Rating Prior to Med Admin: 3 (10/3/2023  2:47 PM)  Rafiq Score: 10 (10/3/2023  3:15 PM)

## 2023-10-04 ENCOUNTER — OFFICE VISIT (OUTPATIENT)
Dept: OPHTHALMOLOGY | Facility: CLINIC | Age: 73
End: 2023-10-04
Payer: MEDICARE

## 2023-10-04 DIAGNOSIS — Z98.890 POST-OPERATIVE STATE: Primary | ICD-10-CM

## 2023-10-04 PROCEDURE — 1159F PR MEDICATION LIST DOCUMENTED IN MEDICAL RECORD: ICD-10-PCS | Mod: CPTII,S$GLB,, | Performed by: OPHTHALMOLOGY

## 2023-10-04 PROCEDURE — 1160F PR REVIEW ALL MEDS BY PRESCRIBER/CLIN PHARMACIST DOCUMENTED: ICD-10-PCS | Mod: CPTII,S$GLB,, | Performed by: OPHTHALMOLOGY

## 2023-10-04 PROCEDURE — 1160F RVW MEDS BY RX/DR IN RCRD: CPT | Mod: CPTII,S$GLB,, | Performed by: OPHTHALMOLOGY

## 2023-10-04 PROCEDURE — 1159F MED LIST DOCD IN RCRD: CPT | Mod: CPTII,S$GLB,, | Performed by: OPHTHALMOLOGY

## 2023-10-04 PROCEDURE — 99024 PR POST-OP FOLLOW-UP VISIT: ICD-10-PCS | Mod: S$GLB,,, | Performed by: OPHTHALMOLOGY

## 2023-10-04 PROCEDURE — 1126F PR PAIN SEVERITY QUANTIFIED, NO PAIN PRESENT: ICD-10-PCS | Mod: CPTII,S$GLB,, | Performed by: OPHTHALMOLOGY

## 2023-10-04 PROCEDURE — 99024 POSTOP FOLLOW-UP VISIT: CPT | Mod: S$GLB,,, | Performed by: OPHTHALMOLOGY

## 2023-10-04 PROCEDURE — 1126F AMNT PAIN NOTED NONE PRSNT: CPT | Mod: CPTII,S$GLB,, | Performed by: OPHTHALMOLOGY

## 2023-10-04 PROCEDURE — 99999 PR PBB SHADOW E&M-EST. PATIENT-LVL II: ICD-10-PCS | Mod: PBBFAC,,, | Performed by: OPHTHALMOLOGY

## 2023-10-04 PROCEDURE — 99999 PR PBB SHADOW E&M-EST. PATIENT-LVL II: CPT | Mod: PBBFAC,,, | Performed by: OPHTHALMOLOGY

## 2023-10-04 NOTE — PROGRESS NOTES
Subjective:       Patient ID: Herman Chirinos is a 73 y.o. male.    Chief Complaint: Post-op Evaluation    HPI    72 y/o male here for 1 day Post Op OD  Pt state no complaints at this time   Denies f/f      Gtts;  Oflox qid OD  Pred qid OD   Keto qid OD   Last edited by Tere Suarez on 10/4/2023  7:55 AM.             Assessment:       1. Post-operative state        Plan:       S/p CE OD- Doing well.           CPM OD.  RTC 1 wk.

## 2023-10-08 NOTE — TELEPHONE ENCOUNTER
Assessment: Genny Soni presents with functional mobility impairments which indicate the need for skilled intervention. Tolerating session today without incident. Will continue to follow and progress as tolerated.Patient knows spine prec.  Should be ok to dc home with some family assist and HH  to get her back to mobility. Still favoring LLE some due to pain.        Spoke with patient--scheduled HVF 24-2sf/OCT/full exam for 6/14--per Dr. Beckett's directions.

## 2023-10-11 ENCOUNTER — OFFICE VISIT (OUTPATIENT)
Dept: OPHTHALMOLOGY | Facility: CLINIC | Age: 73
End: 2023-10-11
Payer: MEDICARE

## 2023-10-11 DIAGNOSIS — Z98.890 POST-OPERATIVE STATE: Primary | ICD-10-CM

## 2023-10-11 PROCEDURE — 99024 POSTOP FOLLOW-UP VISIT: CPT | Mod: S$GLB,,, | Performed by: OPHTHALMOLOGY

## 2023-10-11 PROCEDURE — 99024 PR POST-OP FOLLOW-UP VISIT: ICD-10-PCS | Mod: S$GLB,,, | Performed by: OPHTHALMOLOGY

## 2023-10-11 PROCEDURE — 1159F MED LIST DOCD IN RCRD: CPT | Mod: CPTII,S$GLB,, | Performed by: OPHTHALMOLOGY

## 2023-10-11 PROCEDURE — 99999 PR PBB SHADOW E&M-EST. PATIENT-LVL II: CPT | Mod: PBBFAC,,, | Performed by: OPHTHALMOLOGY

## 2023-10-11 PROCEDURE — 1160F RVW MEDS BY RX/DR IN RCRD: CPT | Mod: CPTII,S$GLB,, | Performed by: OPHTHALMOLOGY

## 2023-10-11 PROCEDURE — 1160F PR REVIEW ALL MEDS BY PRESCRIBER/CLIN PHARMACIST DOCUMENTED: ICD-10-PCS | Mod: CPTII,S$GLB,, | Performed by: OPHTHALMOLOGY

## 2023-10-11 PROCEDURE — 1101F PT FALLS ASSESS-DOCD LE1/YR: CPT | Mod: CPTII,S$GLB,, | Performed by: OPHTHALMOLOGY

## 2023-10-11 PROCEDURE — 1159F PR MEDICATION LIST DOCUMENTED IN MEDICAL RECORD: ICD-10-PCS | Mod: CPTII,S$GLB,, | Performed by: OPHTHALMOLOGY

## 2023-10-11 PROCEDURE — 1101F PR PT FALLS ASSESS DOC 0-1 FALLS W/OUT INJ PAST YR: ICD-10-PCS | Mod: CPTII,S$GLB,, | Performed by: OPHTHALMOLOGY

## 2023-10-11 PROCEDURE — 3288F FALL RISK ASSESSMENT DOCD: CPT | Mod: CPTII,S$GLB,, | Performed by: OPHTHALMOLOGY

## 2023-10-11 PROCEDURE — 99999 PR PBB SHADOW E&M-EST. PATIENT-LVL II: ICD-10-PCS | Mod: PBBFAC,,, | Performed by: OPHTHALMOLOGY

## 2023-10-11 PROCEDURE — 3288F PR FALLS RISK ASSESSMENT DOCUMENTED: ICD-10-PCS | Mod: CPTII,S$GLB,, | Performed by: OPHTHALMOLOGY

## 2023-10-12 NOTE — PROGRESS NOTES
Subjective:       Patient ID: Herman Chirinos is a 73 y.o. male.    Chief Complaint: Post-op Evaluation    HPI    72 y/o male here for 1 week Post Op OD  Pt state no complaints at this time   Denies f/f      Gtts;  Pred tid OD   Keto tid OD   Last edited by Paresh Henderson on 10/11/2023  2:06 PM.             Assessment:       1. Post-operative state        Plan:       S/p CE OD- Doing well.         Taper gtts OD.  RTC 3 wks.

## 2023-10-14 DIAGNOSIS — N52.9 ERECTILE DYSFUNCTION, UNSPECIFIED ERECTILE DYSFUNCTION TYPE: ICD-10-CM

## 2023-10-14 NOTE — TELEPHONE ENCOUNTER
No care due was identified.  [facility] Embedded Care Due Messages. Reference number: [id number].   10/14/2023 3:30:56 AM CDT

## 2023-10-15 RX ORDER — SILDENAFIL 100 MG/1
100 TABLET, FILM COATED ORAL DAILY PRN
Qty: 10 TABLET | Refills: 11 | Status: SHIPPED | OUTPATIENT
Start: 2023-10-15 | End: 2024-02-22

## 2023-10-15 NOTE — TELEPHONE ENCOUNTER
Refill Decision Note   Herman Chirinos  is requesting a refill authorization.  Brief Assessment and Rationale for Refill:  Approve     Medication Therapy Plan:         Comments:     Note composed:3:59 AM 10/15/2023

## 2023-11-01 ENCOUNTER — OFFICE VISIT (OUTPATIENT)
Dept: OPHTHALMOLOGY | Facility: CLINIC | Age: 73
End: 2023-11-01
Payer: MEDICARE

## 2023-11-01 DIAGNOSIS — H25.12 NUCLEAR SCLEROSIS OF LEFT EYE: ICD-10-CM

## 2023-11-01 DIAGNOSIS — H52.7 REFRACTIVE ERROR: ICD-10-CM

## 2023-11-01 DIAGNOSIS — H40.013 OPEN ANGLE WITH BORDERLINE FINDINGS, LOW RISK, BILATERAL: ICD-10-CM

## 2023-11-01 DIAGNOSIS — Z98.890 POST-OPERATIVE STATE: Primary | ICD-10-CM

## 2023-11-01 PROCEDURE — 99024 PR POST-OP FOLLOW-UP VISIT: ICD-10-PCS | Mod: S$GLB,,, | Performed by: OPHTHALMOLOGY

## 2023-11-01 PROCEDURE — 1159F PR MEDICATION LIST DOCUMENTED IN MEDICAL RECORD: ICD-10-PCS | Mod: CPTII,S$GLB,, | Performed by: OPHTHALMOLOGY

## 2023-11-01 PROCEDURE — 99024 POSTOP FOLLOW-UP VISIT: CPT | Mod: S$GLB,,, | Performed by: OPHTHALMOLOGY

## 2023-11-01 PROCEDURE — 99999 PR PBB SHADOW E&M-EST. PATIENT-LVL II: CPT | Mod: PBBFAC,,, | Performed by: OPHTHALMOLOGY

## 2023-11-01 PROCEDURE — 1101F PR PT FALLS ASSESS DOC 0-1 FALLS W/OUT INJ PAST YR: ICD-10-PCS | Mod: CPTII,S$GLB,, | Performed by: OPHTHALMOLOGY

## 2023-11-01 PROCEDURE — 1160F PR REVIEW ALL MEDS BY PRESCRIBER/CLIN PHARMACIST DOCUMENTED: ICD-10-PCS | Mod: CPTII,S$GLB,, | Performed by: OPHTHALMOLOGY

## 2023-11-01 PROCEDURE — 3288F FALL RISK ASSESSMENT DOCD: CPT | Mod: CPTII,S$GLB,, | Performed by: OPHTHALMOLOGY

## 2023-11-01 PROCEDURE — 1160F RVW MEDS BY RX/DR IN RCRD: CPT | Mod: CPTII,S$GLB,, | Performed by: OPHTHALMOLOGY

## 2023-11-01 PROCEDURE — 1126F PR PAIN SEVERITY QUANTIFIED, NO PAIN PRESENT: ICD-10-PCS | Mod: CPTII,S$GLB,, | Performed by: OPHTHALMOLOGY

## 2023-11-01 PROCEDURE — 1159F MED LIST DOCD IN RCRD: CPT | Mod: CPTII,S$GLB,, | Performed by: OPHTHALMOLOGY

## 2023-11-01 PROCEDURE — 1126F AMNT PAIN NOTED NONE PRSNT: CPT | Mod: CPTII,S$GLB,, | Performed by: OPHTHALMOLOGY

## 2023-11-01 PROCEDURE — 3288F PR FALLS RISK ASSESSMENT DOCUMENTED: ICD-10-PCS | Mod: CPTII,S$GLB,, | Performed by: OPHTHALMOLOGY

## 2023-11-01 PROCEDURE — 1101F PT FALLS ASSESS-DOCD LE1/YR: CPT | Mod: CPTII,S$GLB,, | Performed by: OPHTHALMOLOGY

## 2023-11-01 PROCEDURE — 99999 PR PBB SHADOW E&M-EST. PATIENT-LVL II: ICD-10-PCS | Mod: PBBFAC,,, | Performed by: OPHTHALMOLOGY

## 2023-11-01 NOTE — PROGRESS NOTES
Subjective:       Patient ID: Herman Chirinos is a 73 y.o. male.    Chief Complaint: Post-op Evaluation    HPI    73 year old patient is here today for three week post op OD.   Patient has no complaints at this time.     Latanoprost OS QHS    Last edited by Teodora Kumari on 11/1/2023 10:17 AM.             Assessment:       1. Post-operative state    2. Nuclear sclerosis of left eye    3. Open angle with borderline findings, low risk, bilateral    4. Refractive error        Plan:       S/p CE OD- Doing well.     Cataract OS- Not visually significant.  Glaucoma suspect OU-IOP is acceptable OD today.  RE-Pt does not want MRx.    Cont Latanoprost OS & restart OD.  RTC Dr Beckett in 6 mos.

## 2024-01-14 DIAGNOSIS — H40.1131 PRIMARY OPEN ANGLE GLAUCOMA OF BOTH EYES, MILD STAGE: ICD-10-CM

## 2024-01-16 RX ORDER — LATANOPROST 50 UG/ML
1 SOLUTION/ DROPS OPHTHALMIC NIGHTLY
Qty: 2.5 ML | Refills: 12 | Status: SHIPPED | OUTPATIENT
Start: 2024-01-16

## 2024-01-29 DIAGNOSIS — N40.1 BPH WITH URINARY OBSTRUCTION: ICD-10-CM

## 2024-01-29 DIAGNOSIS — N13.8 BPH WITH URINARY OBSTRUCTION: ICD-10-CM

## 2024-01-29 NOTE — TELEPHONE ENCOUNTER
Care Due:                  Date            Visit Type   Department     Provider  --------------------------------------------------------------------------------                                EP -                              PRIMARY      [facility] INTER[facility]  Last Visit: 10-      CARE ([facility])   MEDICINE       Avila Toledo  Next Visit: None Scheduled  None         None Found                                                            Last  Test          Frequency    Reason                     Performed    Due Date  --------------------------------------------------------------------------------    Office Visit  15 months..  pravastatin, sildenafiL,   10-   01-                             tamsulosin...............    CMP.........  12 months..  pravastatin..............  10-   10-    Lipid Panel.  12 months..  pravastatin..............  10-   10-    [employer] Embedded Care Due Messages. Reference number: [id number].   1/29/2024 1:30:39 PM CST

## 2024-01-30 ENCOUNTER — TELEPHONE (OUTPATIENT)
Dept: INTERNAL MEDICINE | Facility: CLINIC | Age: 74
End: 2024-01-30
Payer: MEDICARE

## 2024-01-30 ENCOUNTER — PATIENT MESSAGE (OUTPATIENT)
Dept: INTERNAL MEDICINE | Facility: CLINIC | Age: 74
End: 2024-01-30
Payer: MEDICARE

## 2024-01-30 DIAGNOSIS — R73.03 PREDIABETES: Primary | ICD-10-CM

## 2024-01-30 DIAGNOSIS — N13.8 BPH WITH URINARY OBSTRUCTION: ICD-10-CM

## 2024-01-30 DIAGNOSIS — E78.2 MIXED HYPERLIPIDEMIA: ICD-10-CM

## 2024-01-30 DIAGNOSIS — N40.1 BPH WITH URINARY OBSTRUCTION: ICD-10-CM

## 2024-01-30 RX ORDER — TAMSULOSIN HYDROCHLORIDE 0.4 MG/1
0.4 CAPSULE ORAL
Qty: 90 CAPSULE | Refills: 0 | Status: SHIPPED | OUTPATIENT
Start: 2024-01-30 | End: 2024-02-12

## 2024-01-30 NOTE — TELEPHONE ENCOUNTER
Hi, please call patient --  I will send in one more prescription of this medicine, but I would need to see patient back for any further prescriptions.  Please offer a followup appt for within 4-8 weeks  Thank you, Avila Toledo

## 2024-01-31 NOTE — TELEPHONE ENCOUNTER
Hi, please contact the patient to assist in scheduling    Orders Placed This Encounter    Comprehensive Metabolic Panel    Lipid Panel    Hemoglobin A1C       Thank you, Avila Toledo

## 2024-02-05 ENCOUNTER — LAB VISIT (OUTPATIENT)
Dept: LAB | Facility: HOSPITAL | Age: 74
End: 2024-02-05
Attending: INTERNAL MEDICINE
Payer: MEDICARE

## 2024-02-05 DIAGNOSIS — E78.2 MIXED HYPERLIPIDEMIA: ICD-10-CM

## 2024-02-05 DIAGNOSIS — R73.03 PREDIABETES: ICD-10-CM

## 2024-02-05 DIAGNOSIS — N40.1 BPH WITH URINARY OBSTRUCTION: ICD-10-CM

## 2024-02-05 DIAGNOSIS — N13.8 BPH WITH URINARY OBSTRUCTION: ICD-10-CM

## 2024-02-05 LAB
ALBUMIN SERPL BCP-MCNC: 3.7 G/DL (ref 3.5–5.2)
ALP SERPL-CCNC: 46 U/L (ref 55–135)
ALT SERPL W/O P-5'-P-CCNC: 19 U/L (ref 10–44)
ANION GAP SERPL CALC-SCNC: 9 MMOL/L (ref 8–16)
AST SERPL-CCNC: 26 U/L (ref 10–40)
BILIRUB SERPL-MCNC: 0.8 MG/DL (ref 0.1–1)
BUN SERPL-MCNC: 14 MG/DL (ref 8–23)
CALCIUM SERPL-MCNC: 9.8 MG/DL (ref 8.7–10.5)
CHLORIDE SERPL-SCNC: 107 MMOL/L (ref 95–110)
CHOLEST SERPL-MCNC: 164 MG/DL (ref 120–199)
CHOLEST/HDLC SERPL: 2.9 {RATIO} (ref 2–5)
CO2 SERPL-SCNC: 26 MMOL/L (ref 23–29)
CREAT SERPL-MCNC: 0.9 MG/DL (ref 0.5–1.4)
EST. GFR  (NO RACE VARIABLE): >60 ML/MIN/1.73 M^2
ESTIMATED AVG GLUCOSE: 117 MG/DL (ref 68–131)
GLUCOSE SERPL-MCNC: 94 MG/DL (ref 70–110)
HBA1C MFR BLD: 5.7 % (ref 4–5.6)
HDLC SERPL-MCNC: 57 MG/DL (ref 40–75)
HDLC SERPL: 34.8 % (ref 20–50)
LDLC SERPL CALC-MCNC: 97.8 MG/DL (ref 63–159)
NONHDLC SERPL-MCNC: 107 MG/DL
POTASSIUM SERPL-SCNC: 3.7 MMOL/L (ref 3.5–5.1)
PROT SERPL-MCNC: 7 G/DL (ref 6–8.4)
SODIUM SERPL-SCNC: 142 MMOL/L (ref 136–145)
TRIGL SERPL-MCNC: 46 MG/DL (ref 30–150)

## 2024-02-05 PROCEDURE — 36415 COLL VENOUS BLD VENIPUNCTURE: CPT | Performed by: INTERNAL MEDICINE

## 2024-02-05 PROCEDURE — 83036 HEMOGLOBIN GLYCOSYLATED A1C: CPT | Performed by: INTERNAL MEDICINE

## 2024-02-05 PROCEDURE — 80053 COMPREHEN METABOLIC PANEL: CPT | Performed by: INTERNAL MEDICINE

## 2024-02-05 PROCEDURE — 80061 LIPID PANEL: CPT | Performed by: INTERNAL MEDICINE

## 2024-02-12 ENCOUNTER — OFFICE VISIT (OUTPATIENT)
Dept: INTERNAL MEDICINE | Facility: CLINIC | Age: 74
End: 2024-02-12
Payer: MEDICARE

## 2024-02-12 VITALS
DIASTOLIC BLOOD PRESSURE: 70 MMHG | HEIGHT: 72 IN | HEART RATE: 74 BPM | SYSTOLIC BLOOD PRESSURE: 126 MMHG | BODY MASS INDEX: 27.68 KG/M2 | WEIGHT: 204.38 LBS | OXYGEN SATURATION: 98 %

## 2024-02-12 DIAGNOSIS — E78.2 MIXED HYPERLIPIDEMIA: ICD-10-CM

## 2024-02-12 DIAGNOSIS — N13.8 BPH WITH URINARY OBSTRUCTION: ICD-10-CM

## 2024-02-12 DIAGNOSIS — N40.1 BPH WITH URINARY OBSTRUCTION: ICD-10-CM

## 2024-02-12 DIAGNOSIS — G89.29 CHRONIC BILATERAL LOW BACK PAIN WITHOUT SCIATICA: Primary | ICD-10-CM

## 2024-02-12 DIAGNOSIS — M54.50 CHRONIC BILATERAL LOW BACK PAIN WITHOUT SCIATICA: Primary | ICD-10-CM

## 2024-02-12 PROCEDURE — 1159F MED LIST DOCD IN RCRD: CPT | Mod: CPTII,S$GLB,, | Performed by: INTERNAL MEDICINE

## 2024-02-12 PROCEDURE — 3008F BODY MASS INDEX DOCD: CPT | Mod: CPTII,S$GLB,, | Performed by: INTERNAL MEDICINE

## 2024-02-12 PROCEDURE — 99214 OFFICE O/P EST MOD 30 MIN: CPT | Mod: S$GLB,,, | Performed by: INTERNAL MEDICINE

## 2024-02-12 PROCEDURE — 3078F DIAST BP <80 MM HG: CPT | Mod: CPTII,S$GLB,, | Performed by: INTERNAL MEDICINE

## 2024-02-12 PROCEDURE — 99999 PR PBB SHADOW E&M-EST. PATIENT-LVL IV: CPT | Mod: PBBFAC,,, | Performed by: INTERNAL MEDICINE

## 2024-02-12 PROCEDURE — 1160F RVW MEDS BY RX/DR IN RCRD: CPT | Mod: CPTII,S$GLB,, | Performed by: INTERNAL MEDICINE

## 2024-02-12 PROCEDURE — 3044F HG A1C LEVEL LT 7.0%: CPT | Mod: CPTII,S$GLB,, | Performed by: INTERNAL MEDICINE

## 2024-02-12 PROCEDURE — 3074F SYST BP LT 130 MM HG: CPT | Mod: CPTII,S$GLB,, | Performed by: INTERNAL MEDICINE

## 2024-02-12 RX ORDER — PRAVASTATIN SODIUM 80 MG/1
80 TABLET ORAL DAILY
Qty: 90 TABLET | Refills: 11 | Status: SHIPPED | OUTPATIENT
Start: 2024-02-12 | End: 2024-04-29

## 2024-02-12 RX ORDER — TAMSULOSIN HYDROCHLORIDE 0.4 MG/1
0.4 CAPSULE ORAL DAILY
Qty: 90 CAPSULE | Refills: 0 | Status: SHIPPED | OUTPATIENT
Start: 2024-02-12 | End: 2024-05-03

## 2024-02-12 NOTE — PROGRESS NOTES
Subjective:       Patient ID: Herman Chirinos is a 73 y.o. male.    Chief Complaint: Annual Exam    Patient is here for followup for chronic conditions.    Having sciatica like pains down L posterior leg. Aleve helps with pains.    Will wake up to urinate in middle of noc despite taking flomax.      Review of Systems   Constitutional:  Negative for appetite change and unexpected weight change.   Respiratory:  Negative for chest tightness and shortness of breath.    Cardiovascular:  Negative for chest pain.   Gastrointestinal:  Negative for abdominal pain.   Genitourinary:  Positive for frequency. Negative for difficulty urinating, scrotal swelling and testicular pain.   Musculoskeletal:  Positive for arthralgias (knee pain L) and back pain.        Radicular leg pains bilat   Skin:         No lesions           Objective:      Physical Exam  Vitals reviewed.   Constitutional:       General: He is not in acute distress.     Appearance: Normal appearance. He is well-developed. He is not ill-appearing, toxic-appearing or diaphoretic.   HENT:      Head: Normocephalic and atraumatic.   Eyes:      General: No scleral icterus.  Neck:      Thyroid: No thyromegaly.   Cardiovascular:      Rate and Rhythm: Normal rate and regular rhythm.      Heart sounds: Normal heart sounds. No murmur heard.     No friction rub. No gallop.   Pulmonary:      Effort: Pulmonary effort is normal. No respiratory distress.      Breath sounds: Normal breath sounds. No wheezing or rales.   Abdominal:      General: Bowel sounds are normal. There is no distension.      Palpations: Abdomen is soft. There is no mass.      Tenderness: There is no abdominal tenderness. There is no guarding or rebound.   Musculoskeletal:         General: Normal range of motion.      Cervical back: Normal range of motion.      Comments: L knee crepitus but rom is intact    No midline spine tenderness to deep palpation    Neg SLR bilat   Lymphadenopathy:      Cervical: No  cervical adenopathy.   Skin:     Findings: No lesion.   Neurological:      Mental Status: He is alert and oriented to person, place, and time.   Psychiatric:         Mood and Affect: Mood normal.         Behavior: Behavior normal.         Thought Content: Thought content normal.         Assessment:       1. Chronic bilateral low back pain without sciatica    2. Mixed hyperlipidemia    3. BPH with urinary obstruction        Plan:       Herman was seen today for annual exam.    Diagnoses and all orders for this visit:    Chronic bilateral low back pain without sciatica  -     Ambulatory referral/consult to [facility] Sp[facility] Clinic; Future    Mixed hyperlipidemia  -     pravastatin (PRAVACHOL) 80 MG tablet; Take 1 tablet (80 mg total) by mouth once daily.    BPH with urinary obstruction  -     tamsulosin (FLOMAX) 0.4 mg Cap; Take 1 capsule (0.4 mg total) by mouth once daily.  -     Ambulatory referral/consult to Urology; Future      Prediabetes -- discussed decreased carbs/sugars in diet    Follow up in about 1 year (around 2/12/2025).    Future Appointments   Date Time Provider Department Center   2/19/2024  1:00 PM Yoana Cleveland MD [facility]   2/22/2024 10:30 AM Raymundo Dennison MD [facility]   2/14/2025  1:20 PM Avila Toledo MD [facility] IM [facility]

## 2024-02-19 ENCOUNTER — OFFICE VISIT (OUTPATIENT)
Dept: PAIN MEDICINE | Facility: CLINIC | Age: 74
End: 2024-02-19
Payer: MEDICARE

## 2024-02-19 ENCOUNTER — HOSPITAL ENCOUNTER (OUTPATIENT)
Dept: RADIOLOGY | Facility: OTHER | Age: 74
Discharge: HOME | End: 2024-02-19
Attending: STUDENT IN AN ORGANIZED HEALTH CARE EDUCATION/TRAINING PROGRAM
Payer: MEDICARE

## 2024-02-19 VITALS
DIASTOLIC BLOOD PRESSURE: 75 MMHG | WEIGHT: 204.81 LBS | BODY MASS INDEX: 27.78 KG/M2 | OXYGEN SATURATION: 98 % | SYSTOLIC BLOOD PRESSURE: 114 MMHG | TEMPERATURE: 98 F | HEART RATE: 65 BPM | RESPIRATION RATE: 18 BRPM

## 2024-02-19 DIAGNOSIS — M54.50 CHRONIC BILATERAL LOW BACK PAIN WITHOUT SCIATICA: ICD-10-CM

## 2024-02-19 DIAGNOSIS — G89.29 CHRONIC BILATERAL LOW BACK PAIN WITHOUT SCIATICA: ICD-10-CM

## 2024-02-19 DIAGNOSIS — M54.16 LUMBAR RADICULOPATHY: ICD-10-CM

## 2024-02-19 DIAGNOSIS — M54.16 LUMBAR RADICULOPATHY: Primary | ICD-10-CM

## 2024-02-19 PROCEDURE — 1101F PT FALLS ASSESS-DOCD LE1/YR: CPT | Mod: CPTII,S$GLB,, | Performed by: STUDENT IN AN ORGANIZED HEALTH CARE EDUCATION/TRAINING PROGRAM

## 2024-02-19 PROCEDURE — 72114 X-RAY EXAM L-S SPINE BENDING: CPT | Mod: TC,FY

## 2024-02-19 PROCEDURE — 1125F AMNT PAIN NOTED PAIN PRSNT: CPT | Mod: CPTII,S$GLB,, | Performed by: STUDENT IN AN ORGANIZED HEALTH CARE EDUCATION/TRAINING PROGRAM

## 2024-02-19 PROCEDURE — 3288F FALL RISK ASSESSMENT DOCD: CPT | Mod: CPTII,S$GLB,, | Performed by: STUDENT IN AN ORGANIZED HEALTH CARE EDUCATION/TRAINING PROGRAM

## 2024-02-19 PROCEDURE — 99214 OFFICE O/P EST MOD 30 MIN: CPT | Mod: S$GLB,,, | Performed by: STUDENT IN AN ORGANIZED HEALTH CARE EDUCATION/TRAINING PROGRAM

## 2024-02-19 PROCEDURE — 72114 X-RAY EXAM L-S SPINE BENDING: CPT | Mod: 26,,, | Performed by: RADIOLOGY

## 2024-02-19 PROCEDURE — 3074F SYST BP LT 130 MM HG: CPT | Mod: CPTII,S$GLB,, | Performed by: STUDENT IN AN ORGANIZED HEALTH CARE EDUCATION/TRAINING PROGRAM

## 2024-02-19 PROCEDURE — 3078F DIAST BP <80 MM HG: CPT | Mod: CPTII,S$GLB,, | Performed by: STUDENT IN AN ORGANIZED HEALTH CARE EDUCATION/TRAINING PROGRAM

## 2024-02-19 PROCEDURE — 99999 PR PBB SHADOW E&M-EST. PATIENT-LVL V: CPT | Mod: PBBFAC,,, | Performed by: STUDENT IN AN ORGANIZED HEALTH CARE EDUCATION/TRAINING PROGRAM

## 2024-02-19 PROCEDURE — 1160F RVW MEDS BY RX/DR IN RCRD: CPT | Mod: CPTII,S$GLB,, | Performed by: STUDENT IN AN ORGANIZED HEALTH CARE EDUCATION/TRAINING PROGRAM

## 2024-02-19 PROCEDURE — 3008F BODY MASS INDEX DOCD: CPT | Mod: CPTII,S$GLB,, | Performed by: STUDENT IN AN ORGANIZED HEALTH CARE EDUCATION/TRAINING PROGRAM

## 2024-02-19 PROCEDURE — 1159F MED LIST DOCD IN RCRD: CPT | Mod: CPTII,S$GLB,, | Performed by: STUDENT IN AN ORGANIZED HEALTH CARE EDUCATION/TRAINING PROGRAM

## 2024-02-19 PROCEDURE — 3044F HG A1C LEVEL LT 7.0%: CPT | Mod: CPTII,S$GLB,, | Performed by: STUDENT IN AN ORGANIZED HEALTH CARE EDUCATION/TRAINING PROGRAM

## 2024-02-19 NOTE — PROGRESS NOTES
Chronic Pain- Established Visit    PCP: Avila Toledo MD    REFERRING PHYSICIAN: Avila Toledo MD    CHIEF COMPLAINT: Lower Back and Bilateral Leg Pain    Original HISTORY OF PRESENT ILLNESS: Herman Chirinos initially presented to the clinic for the evaluation of knee pain. The pain started years ago and has been progressively worse.     Original Pain Description:  The pain is located in the bilateral knees and the left is much worse than the right. It does not radiate. The pain is described as aching, dull, and stabbing. Exacerbating factors: Walking, Getting out of bed/chair, and stumbling. Mitigating factors medications, physical therapy, and rest. Symptoms interfere with daily activity and falling asleep. The patient feels like symptoms have been worsening. Patient denies urinary incontinence, bowel incontinence, significant weight loss, significant motor weakness, and loss of sensations.    Original PAIN SCORES:  Best: Pain is 3  Worst: Pain is 10  Usually: Pain is 5  Current: Pain is 3    INTERVAL HISTORY:   Interval History 5/5/2023:  The patient returns to clinic today for follow up of knee pain. He is s/p left genicular nerve block on 11/22/2022. He reports 90% relief of his left knee pain. He even noted relief for a month. His pain has returned to baseline. He reports left knee pain, worse with prolonged walking. He reports difficulty bending the knee. He continues to perform a home exercise routine. He denies any other health changes. His pain today is 4/10.    INTERVAL HISTORY 2/19/2024:  Mr. Sutton returns now for buttock pain which radiates down bilateral lower extremities. Current Pain level is 4-5/10.  He feels sometimes the pain gets up to a 8/10.  He feels that standing/walking/bending forward/lifting objects makes his pain worse.  Sitting and relaxing helps his pain. He has tried tylenol and/or aleve and he feels this eases up his pain. He hasn't tried any home exercises. He feels the  pain makes his legs feel week and unstable.     6 weeks of Conservative therapy (PT/Chiro/Home Exercises with Dates)  PT - March 2022 - helped ROM and strength     Treatments / Medications: (Ice/Heat/NSAIDS/APAP/etc):  Aleve - occasionally  Knee Sleeve      Report:  Consistent and Appropriate with report.    Interventional Pain Procedures: (Previous injections)  2 CSI in Left Knee over the past few years with decreasing efficacy.   11/22/2022- Left genicular nerve block   6/6/2023 - left genicular nerve radiofrequency    Past Medical History:   Diagnosis Date    Appendicitis     Benign neoplasm of colon     Cataract     Chronic rhinitis     Colon polyps     Glaucoma     Hyperlipidemia     IFG (impaired fasting glucose) 1/8/2016    Lumbar stenosis     Osteoarthritis of both knees 5/24/2018    Tubular adenoma of colon: 4/16 repeat 2021 4/7/2017     Past Surgical History:   Procedure Laterality Date    APPENDECTOMY      CATARACT EXTRACTION W/  INTRAOCULAR LENS IMPLANT Right 10/3/2023    Procedure: EXTRACTION, CATARACT, WITH IOL INSERTION;  Surgeon: Riley Dougherty MD;  Location: [facility] OR;  Service: Ophthalmology;  Laterality: Right;    COLONOSCOPY  2007    repeat recommended in five years    COLONOSCOPY N/A 4/7/2016    Procedure: COLONOSCOPY;  Surgeon: Luis Yusuf MD;  Location: [facility] (4TH FLR);  Service: Endoscopy;  Laterality: N/A;  last procedure with Maciel, patient requesting very early appt    COLONOSCOPY N/A 7/8/2021    Procedure: COLONOSCOPY;  Surgeon: Austin Pettit MD;  Location: [facility] ENDO (4TH FLR);  Service: Endoscopy;  Laterality: N/A;  Fully vacc Covid-19 - pg    RADIOFREQUENCY ABLATION Left 6/6/2023    Procedure: RADIOFREQUENCY ABLATION, LEFT GENICULAR COOLED;  Surgeon: Debby Javier MD;  Location: [facility] PAIN MGT;  Service: Pain Management;  Laterality: Left;     Social History     Socioeconomic History    Marital status:     Number of children: 6   Occupational  History    Occupation: [employer]     Employer: [employer]     Comment: [employer]   Tobacco Use    Smoking status: Never    Smokeless tobacco: Never   Substance and Sexual Activity    Alcohol use: No    Drug use: No    Sexual activity: Yes     Partners: Female   Social History Narrative     2020, lives alone. 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 Has a Judaism life. Active with yard work, walks. Prev work as  at [employer].     Social Determinants of Health     Financial Resource Strain: Low Risk  (2/12/2024)    Overall Financial Resource Strain (CARDIA)     Difficulty of Paying Living Expenses: Not hard at all   Food Insecurity: No Food Insecurity (2/12/2024)    Hunger Vital Sign     Worried About Running Out of Food in the Last Year: Never true     Ran Out of Food in the Last Year: Never true   Transportation Needs: No Transportation Needs (2/12/2024)    PRAPARE - Transportation     Lack of Transportation (Medical): No     Lack of Transportation (Non-Medical): No   Physical Activity: Inactive (2/12/2024)    Exercise Vital Sign     Days of Exercise per Week: 0 days     Minutes of Exercise per Session: 20 min   Stress: Stress Concern Present (2/12/2024)    [employer] [place] [employer] Occupational Health - Occupational Stress Questionnaire     Feeling of Stress : To some extent   Social Connections: Unknown (2/12/2024)    Social Connection and Isolation Panel [NHANES]     Frequency of Communication with Friends and Family: More than three times a week     Frequency of Social Gatherings with Friends and Family: Once a week     Active Member of Clubs or Organizations: Yes     Attends Club or Organization Meetings: More than 4 times per year     Marital Status:    Housing Stability: Low Risk  (2/12/2024)    Housing Stability Vital Sign     Unable to Pay for Housing in the Last Year: No     Number of Places Lived in the Last Year: 1     Unstable Housing in the Last Year: No     Family History   Problem  Relation Age of Onset    Coronary artery disease Mother     Hypertension Mother     Heart disease Mother         heart transplant    Diabetes Mellitus Father     Diabetes Father     Kidney disease Sister     No Known Problems Sister     Kidney disease Brother     Cancer Brother         bladder ca    No Known Problems Brother     Cancer Daughter         br ca, CR    No Known Problems Daughter     No Known Problems Daughter     No Known Problems Son     No Known Problems Son     No Known Problems Son     No Known Problems Maternal Aunt     No Known Problems Maternal Uncle     No Known Problems Paternal Aunt     No Known Problems Paternal Uncle     No Known Problems Maternal Grandmother     No Known Problems Maternal Grandfather     No Known Problems Paternal Grandmother     No Known Problems Paternal Grandfather     Stroke Neg Hx     Glaucoma Neg Hx     Cataracts Neg Hx     Blindness Neg Hx     Amblyopia Neg Hx     Macular degeneration Neg Hx     Retinal detachment Neg Hx     Strabismus Neg Hx     Thyroid disease Neg Hx     Psoriasis Neg Hx     Tremor Neg Hx     Parkinsonism Neg Hx        Review of patient's allergies indicates:  No Known Allergies    Current Outpatient Medications   Medication Sig    aspirin 81 MG Chew Take 1 tablet (81 mg total) by mouth once daily.    cholecalciferol, vitamin D3, (VITAMIN D3) 25 mcg (1,000 unit) capsule Take 1 capsule (1,000 Units total) by mouth once daily.    latanoprost 0.005 % ophthalmic solution INSTILL 1 DROP IN BOTH EYES EVERY EVENING    loratadine (CLARITIN) 10 mg tablet Take 1 tablet (10 mg total) by mouth once daily.    pravastatin (PRAVACHOL) 80 MG tablet Take 1 tablet (80 mg total) by mouth once daily.    sildenafiL (VIAGRA) 100 MG tablet TAKE 1 TABLET(100 MG) BY MOUTH DAILY AS NEEDED FOR ERECTILE DYSFUNCTION    tamsulosin (FLOMAX) 0.4 mg Cap Take 1 capsule (0.4 mg total) by mouth once daily.     No current facility-administered medications for this visit.        ROS:  GENERAL: No fever. No chills. No fatigue. Denies weight loss. Denies weight gain.  HEENT: Denies headaches. Denies vision change. Denies eye pain. Denies double vision. Denies ear pain.   CV: Denies chest pain.   PULM: Denies of shortness of breath.  GI: Denies constipation. No diarrhea. No abdominal pain. Denies nausea. Denies vomiting. No blood in stool.  HEME: Denies bleeding problems.  : Denies urgency. No painful urination. No blood in urine.  MS: B/l Knee Pain.  SKIN: Denies rash.   NEURO: Denies seizures. No weakness.  PSYCH:  Denies difficulty sleeping. No anxiety. Denies depression. No suicidal thoughts.       VITALS:   Vitals:    02/19/24 1322   BP: 114/75   Pulse: 65   Resp: 18   Temp: 97.5 °F (36.4 °C)   SpO2: 98%   Weight: 92.9 kg (204 lb 12.9 oz)   PainSc:   5   PainLoc: Leg           PHYSICAL EXAM:   GENERAL: Well appearing, in no acute distress, alert and oriented x3.  PSYCH:  Mood and affect appropriate.  SKIN: Skin color, texture, turgor normal, no rashes or lesions.  HEENT:  Normocephalic, atraumatic. Cranial nerves grossly intact.  PULM: No evidence of respiratory difficulty, symmetric chest rise.  GI:  Non-distended  BACK: +SLR Left > right.  Normal range of motion.  No pain to palpation  EXTREMITIES: Mild swelling noted in left knee.   MUSCULOSKELETAL: There is pain with palpation over medial joint line of left knee. Limited ROM with pain on flexion. Positive crepitus noted to left knee. Bilateral lower extremity strength is normal and symmetric. No atrophy is noted.  NEURO: Sensation is equal and appropriate bilaterally.   GAIT: Antalgic but independent w/o assistive device.    LABS:  CMP  Sodium   Date Value Ref Range Status   02/05/2024 142 136 - 145 mmol/L Final     Potassium   Date Value Ref Range Status   02/05/2024 3.7 3.5 - 5.1 mmol/L Final     Chloride   Date Value Ref Range Status   02/05/2024 107 95 - 110 mmol/L Final     CO2   Date Value Ref Range Status   02/05/2024  26 23 - 29 mmol/L Final     Glucose   Date Value Ref Range Status   2024 94 70 - 110 mg/dL Final     BUN   Date Value Ref Range Status   2024 14 8 - 23 mg/dL Final     Creatinine   Date Value Ref Range Status   2024 0.9 0.5 - 1.4 mg/dL Final     Calcium   Date Value Ref Range Status   2024 9.8 8.7 - 10.5 mg/dL Final     Total Protein   Date Value Ref Range Status   2024 7.0 6.0 - 8.4 g/dL Final     Albumin   Date Value Ref Range Status   2024 3.7 3.5 - 5.2 g/dL Final     Total Bilirubin   Date Value Ref Range Status   2024 0.8 0.1 - 1.0 mg/dL Final     Comment:     For infants and newborns, interpretation of results should be based  on gestational age, weight and in agreement with clinical  observations.    Premature Infant recommended reference ranges:  Up to 24 hours.............<8.0 mg/dL  Up to 48 hours............<12.0 mg/dL  3-5 days..................<15.0 mg/dL  6-29 days.................<15.0 mg/dL       Alkaline Phosphatase   Date Value Ref Range Status   2024 46 (L) 55 - 135 U/L Final     AST   Date Value Ref Range Status   2024 26 10 - 40 U/L Final     ALT   Date Value Ref Range Status   2024 19 10 - 44 U/L Final     Anion Gap   Date Value Ref Range Status   2024 9 8 - 16 mmol/L Final     eGFR   Date Value Ref Range Status   2024 >60.0 >60 mL/min/1.73 m^2 Final       IMAGING:    Bilateral Knee Xrs 10/18/22  Bilateral tricompartmental osteoarthritis with severe medial joint space narrowing L>R.       ASSESSMENT: 73 y.o. year old male with bilateral knee pain, consistent with the followin. Lumbar radiculopathy  X-Ray Lumbar Complete Including Flex And Ext    Ambulatory referral/consult to Physical/Occupational Therapy      2. Chronic bilateral low back pain without sciatica  Ambulatory referral/consult to [facility]    X-Ray Lumbar Complete Including Flex And Ext    Ambulatory referral/consult to Physical/Occupational  Therapy        IMPRESSION: Mr. Chirinos presents today for chronic lower back pain.  He has previously seen Dr. Javier for left knee pain, and has found good relief from his left knee pain.  His current complaint is regarding lower back pain with radiation down both legs.  History and physical exam are consistent with lumbar radiculopathy.  There is no imaging of his back available at this time.  We will start with basic imaging and conservative management, including physical therapy and over the counter medications.  If his pain persists despite conservative measures, we can consider interventions and advanced imaging.    PLAN:  - I have stressed the importance of physical activity and a home exercise plan to help with pain and improve health.  - Patient can continue with medications for now since they are providing benefits, using them appropriately, and without side effects.  - xray lumbar spine with flexion/extension  - he can continue to take OTCs sparingly, as he finds this helpful  - referral to physical therapy for lower back pain  - return to clinic with Dr. Javier in 2 - 3 months to discuss advanced imaging (MRI) and/or interventions if indicated  - Counseled patient regarding the importance of activity modification and physical therapy.      Yoana Cleveland  02/19/2024

## 2024-02-22 ENCOUNTER — OFFICE VISIT (OUTPATIENT)
Dept: UROLOGY | Facility: CLINIC | Age: 74
End: 2024-02-22
Payer: MEDICARE

## 2024-02-22 VITALS
HEART RATE: 80 BPM | SYSTOLIC BLOOD PRESSURE: 113 MMHG | WEIGHT: 204.5 LBS | BODY MASS INDEX: 27.73 KG/M2 | DIASTOLIC BLOOD PRESSURE: 82 MMHG

## 2024-02-22 DIAGNOSIS — N13.8 BPH WITH URINARY OBSTRUCTION: ICD-10-CM

## 2024-02-22 DIAGNOSIS — N40.1 BPH WITH URINARY OBSTRUCTION: ICD-10-CM

## 2024-02-22 DIAGNOSIS — N52.01 ERECTILE DYSFUNCTION DUE TO ARTERIAL INSUFFICIENCY: Primary | ICD-10-CM

## 2024-02-22 DIAGNOSIS — E78.2 MIXED HYPERLIPIDEMIA: ICD-10-CM

## 2024-02-22 PROBLEM — Z12.11 ENCOUNTER FOR SCREENING COLONOSCOPY: Status: RESOLVED | Noted: 2021-07-08 | Resolved: 2024-02-22

## 2024-02-22 PROCEDURE — 99999 PR PBB SHADOW E&M-EST. PATIENT-LVL III: CPT | Mod: PBBFAC,,, | Performed by: UROLOGY

## 2024-02-22 PROCEDURE — 3288F FALL RISK ASSESSMENT DOCD: CPT | Mod: CPTII,S$GLB,, | Performed by: UROLOGY

## 2024-02-22 PROCEDURE — 3044F HG A1C LEVEL LT 7.0%: CPT | Mod: CPTII,S$GLB,, | Performed by: UROLOGY

## 2024-02-22 PROCEDURE — 3074F SYST BP LT 130 MM HG: CPT | Mod: CPTII,S$GLB,, | Performed by: UROLOGY

## 2024-02-22 PROCEDURE — 99204 OFFICE O/P NEW MOD 45 MIN: CPT | Mod: S$GLB,,, | Performed by: UROLOGY

## 2024-02-22 PROCEDURE — 1160F RVW MEDS BY RX/DR IN RCRD: CPT | Mod: CPTII,S$GLB,, | Performed by: UROLOGY

## 2024-02-22 PROCEDURE — 1159F MED LIST DOCD IN RCRD: CPT | Mod: CPTII,S$GLB,, | Performed by: UROLOGY

## 2024-02-22 PROCEDURE — 3079F DIAST BP 80-89 MM HG: CPT | Mod: CPTII,S$GLB,, | Performed by: UROLOGY

## 2024-02-22 PROCEDURE — 3008F BODY MASS INDEX DOCD: CPT | Mod: CPTII,S$GLB,, | Performed by: UROLOGY

## 2024-02-22 PROCEDURE — 1101F PT FALLS ASSESS-DOCD LE1/YR: CPT | Mod: CPTII,S$GLB,, | Performed by: UROLOGY

## 2024-02-22 PROCEDURE — 1126F AMNT PAIN NOTED NONE PRSNT: CPT | Mod: CPTII,S$GLB,, | Performed by: UROLOGY

## 2024-02-22 RX ORDER — TADALAFIL 20 MG/1
20 TABLET ORAL DAILY PRN
Qty: 20 TABLET | Refills: 11 | Status: SHIPPED | OUTPATIENT
Start: 2024-02-22

## 2024-02-22 NOTE — PROGRESS NOTES
CHIEF COMPLAINT:    Mr. Chirinos is a 73 y.o. male presenting for a consultation at the request of Dr. Toledo. Patient presents with LUTS.    PRESENTING ILLNESS:    Herman Chirinos is a 73 y.o. male who c/o LUTS.  He has nocturia x 2.  + decreased FOS, + incomplete emptying.  He's on flomax.  Is not bothered enough to consider additional therapy at this time.    He has ED.  Gets poor results with Viagra.    REVIEW OF SYSTEMS:    Herman Chirinos denies headache, blurred vision, fever, nausea, vomiting, chills, abdominal pain, chest pain, sore throat, bleeding per rectum, cough, SOB, recent loss of consciousness, recent mental status changes, seizures, dizziness, or upper or lower extremity weakness.    NIDIA  1. 3  2. 4  3. 3  4. 3  5. 3      PATIENT HISTORY:    Past Medical History:   Diagnosis Date    Appendicitis     Benign neoplasm of colon     Cataract     Chronic rhinitis     Colon polyps     Glaucoma     Hyperlipidemia     IFG (impaired fasting glucose) 1/8/2016    Lumbar stenosis     Osteoarthritis of both knees 5/24/2018    Tubular adenoma of colon: 4/16 repeat 2021 4/7/2017       Past Surgical History:   Procedure Laterality Date    APPENDECTOMY      CATARACT EXTRACTION W/  INTRAOCULAR LENS IMPLANT Right 10/3/2023    Procedure: EXTRACTION, CATARACT, WITH IOL INSERTION;  Surgeon: Riley Dougherty MD;  Location: [facility];  Service: Ophthalmology;  Laterality: Right;    COLONOSCOPY  2007    repeat recommended in five years    COLONOSCOPY N/A 4/7/2016    Procedure: COLONOSCOPY;  Surgeon: Luis Yusuf MD;  Location: [facility] ([address] [place] [address]);  Service: Endoscopy;  Laterality: N/A;  last procedure with Janell, patient requesting very early appt    COLONOSCOPY N/A 7/8/2021    Procedure: COLONOSCOPY;  Surgeon: Austin Pettit MD;  [facility]  Service: Endoscopy;  Laterality: N/A;  Fully vacc Covid-19 - pg    RADIOFREQUENCY ABLATION Left 6/6/2023    Procedure: RADIOFREQUENCY  ABLATION, LEFT GENICULAR COOLED;  Surgeon: Debby Javier MD;  Location: [facility];  Service: Pain Management;  Laterality: Left;       Family History   Problem Relation Age of Onset    Coronary artery disease Mother     Hypertension Mother     Heart disease Mother         heart transplant    Diabetes Mellitus Father     Diabetes Father     Kidney disease Sister     No Known Problems Sister     Kidney disease Brother     Cancer Brother         bladder ca    No Known Problems Brother     Cancer Daughter         br ca, CR    No Known Problems Daughter     No Known Problems Daughter     No Known Problems Son     No Known Problems Son     No Known Problems Son     No Known Problems Maternal Aunt     No Known Problems Maternal Uncle     No Known Problems Paternal Aunt     No Known Problems Paternal Uncle     No Known Problems Maternal Grandmother     No Known Problems Maternal Grandfather     No Known Problems Paternal Grandmother     No Known Problems Paternal Grandfather     Stroke Neg Hx     Glaucoma Neg Hx     Cataracts Neg Hx     Blindness Neg Hx     Amblyopia Neg Hx     Macular degeneration Neg Hx     Retinal detachment Neg Hx     Strabismus Neg Hx     Thyroid disease Neg Hx     Psoriasis Neg Hx     Tremor Neg Hx     Parkinsonism Neg Hx        Social History     Socioeconomic History    Marital status:     Number of children: 6   Occupational History    Occupation: [employer]     Employer: [employer]     Comment: [employer]   Tobacco Use    Smoking status: Never    Smokeless tobacco: Never   Substance and Sexual Activity    Alcohol use: No    Drug use: No    Sexual activity: Yes     Partners: Female   Social History Narrative     2020, lives alone. 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 Has a Sikh life. Active with yard work, walks. Prev work as  at [employer].     Social Determinants of Health     Financial Resource Strain: Low Risk  (2/12/2024)    Overall Financial Resource Strain  (CARDIA)     Difficulty of Paying Living Expenses: Not hard at all   Food Insecurity: No Food Insecurity (2/12/2024)    Hunger Vital Sign     Worried About Running Out of Food in the Last Year: Never true     Ran Out of Food in the Last Year: Never true   Transportation Needs: No Transportation Needs (2/12/2024)    PRAPARE - Transportation     Lack of Transportation (Medical): No     Lack of Transportation (Non-Medical): No   Physical Activity: Inactive (2/12/2024)    Exercise Vital Sign     Days of Exercise per Week: 0 days     Minutes of Exercise per Session: 20 min   Stress: Stress Concern Present (2/12/2024)    [place] [employer] - Occupational Stress Questionnaire     Feeling of Stress : To some extent   Social Connections: Unknown (2/12/2024)    Social Connection and Isolation Panel [NHANES]     Frequency of Communication with Friends and Family: More than three times a week     Frequency of Social Gatherings with Friends and Family: Once a week     Active Member of Clubs or Organizations: Yes     Attends Club or Organization Meetings: More than 4 times per year     Marital Status:    Housing Stability: Low Risk  (2/12/2024)    Housing Stability Vital Sign     Unable to Pay for Housing in the Last Year: No     Number of Places Lived in the Last Year: 1     Unstable Housing in the Last Year: No       Allergies:  Patient has no known allergies.    Medications:    Current Outpatient Medications:     aspirin 81 MG Chew, Take 1 tablet (81 mg total) by mouth once daily., Disp: 30 tablet, Rfl: 12    latanoprost 0.005 % ophthalmic solution, INSTILL 1 DROP IN BOTH EYES EVERY EVENING, Disp: 2.5 mL, Rfl: 12    loratadine (CLARITIN) 10 mg tablet, Take 1 tablet (10 mg total) by mouth once daily., Disp: 1 Bottle, Rfl: 0    pravastatin (PRAVACHOL) 80 MG tablet, Take 1 tablet (80 mg total) by mouth once daily., Disp: 90 tablet, Rfl: 11    tamsulosin (FLOMAX) 0.4 mg Cap, Take 1 capsule (0.4 mg  total) by mouth once daily., Disp: 90 capsule, Rfl: 0    cholecalciferol, vitamin D3, (VITAMIN D3) 25 mcg (1,000 unit) capsule, Take 1 capsule (1,000 Units total) by mouth once daily. (Patient not taking: Reported on 2/22/2024), Disp: 30 capsule, Rfl: 0    tadalafiL (CIALIS) 20 MG Tab, Take 1 tablet (20 mg total) by mouth daily as needed. Take 1 hour before intercourse, Disp: 20 tablet, Rfl: 11    PHYSICAL EXAMINATION:    The patient generally appears in good health, is appropriately interactive, and is in no apparent distress.     Eyes: anicteric sclerae, moist conjunctivae; no lid-lag; PERRLA     HENT: Atraumatic; oropharynx clear with moist mucous membranes and no mucosal ulcerations;normal hard and soft palate.  No evidence of lymphadenopathy.    Neck: Trachea midline.  No thyromegaly.    Skin: No lesions.    Mental: Cooperative with normal affect.  Is oriented to time, place, and person.    Neuro: Grossly intact.    Chest: Normal inspiratory effort.   No accessory muscles.  No audible wheezes.  Respirations symmetric on inspiration and expiration.    Heart: Regular rhythm.      Abdomen:  Soft, non-tender. No masses or organomegaly. Bladder is not palpable. No evidence of flank discomfort. No evidence of inguinal hernia.    Genitourinary: The penis is not circumcised with no evidence of plaques or induration. The urethral meatus is normal. The testes, epididymides, and cord structures are normal in size and contour bilaterally. The scrotum is normal in size and contour.    Normal anal sphincter tone. No rectal mass.    The prostate is 50 g. Normal landmarks. Lateral sulci. Median furrow intact.  No nodularity or induration. Seminal vesicles are normal.    Extremities: No clubbing, cyanosis, or edema      LABS:      Lab Results   Component Value Date    PSA 3.3 11/02/2020    PSA 3.3 04/26/2019    PSA 2.5 05/21/2018       IMPRESSION:    Encounter Diagnoses   Name Primary?    Erectile dysfunction due to arterial  insufficiency Yes    BPH with urinary obstruction     Mixed hyperlipidemia      Hyperlipidemia, stable    PLAN:    1. Continue flomax for his LUTS.  Prescribed by PCP.  2. Discussed options for his ED (affected by above comorbidities).  He'd like cialis. Side effects discussed.  A new Rx was given  3. RTC 1 year to see an BROOKE.    Copy to: Tre

## 2024-02-23 ENCOUNTER — TELEPHONE (OUTPATIENT)
Dept: UROLOGY | Facility: CLINIC | Age: 74
End: 2024-02-23
Payer: MEDICARE

## 2024-02-23 NOTE — TELEPHONE ENCOUNTER
----- Message from Nay Bradford sent at 2/23/2024 11:30 AM CST -----  Regarding: Patient Advice  Contact: Pt [phone]  Pt is calling to speak to provider about rx: tadalafiL (CIALIS) 20 MG Tab 20 tablet please call

## 2024-02-28 ENCOUNTER — TELEPHONE (OUTPATIENT)
Dept: UROLOGY | Facility: CLINIC | Age: 74
End: 2024-02-28
Payer: MEDICARE

## 2024-02-28 NOTE — TELEPHONE ENCOUNTER
Pt call returned to update him on prescription request. Pt wanted 100 mg of Cialis, but per Dr. Dennison, 20 mg is the max dosage of Cialis. Pt verbalized understanding and stated he will take the prescription as written.

## 2024-03-07 ENCOUNTER — CLINICAL SUPPORT (OUTPATIENT)
Dept: REHABILITATION | Facility: HOSPITAL | Age: 74
End: 2024-03-07
Attending: STUDENT IN AN ORGANIZED HEALTH CARE EDUCATION/TRAINING PROGRAM
Payer: MEDICARE

## 2024-03-07 DIAGNOSIS — M54.16 LUMBAR RADICULOPATHY: ICD-10-CM

## 2024-03-07 DIAGNOSIS — M54.50 CHRONIC BILATERAL LOW BACK PAIN WITHOUT SCIATICA: ICD-10-CM

## 2024-03-07 DIAGNOSIS — G89.29 CHRONIC BILATERAL LOW BACK PAIN WITHOUT SCIATICA: ICD-10-CM

## 2024-03-07 PROCEDURE — 97110 THERAPEUTIC EXERCISES: CPT

## 2024-03-07 PROCEDURE — 97161 PT EVAL LOW COMPLEX 20 MIN: CPT

## 2024-03-07 NOTE — PLAN OF CARE
[facility]   Physical Therapy Initial Evaluation      Name: Herman Chirinos  [facility] Number: [id number]    Therapy Diagnosis:   Encounter Diagnoses   Name Primary?    Chronic bilateral low back pain without sciatica     Lumbar radiculopathy         Physician: Yoana Cleveland MD    Physician Orders: PT Eval and Treat   Medical Diagnosis from Referral: M54.50,G89.29 (ICD-10-CM) - Chronic bilateral low back pain without sciatica M54.16 (ICD-10-CM) - Lumbar radiculopathy  Evaluation Date: 3/7/2024  Authorization Period Expiration: 2/18/2025  Plan of Care Expiration: 5/3/2024  Progress Note Due: 4/4/2025  Date of Surgery: n/a  Visit # / Visits authorized: 1/ 1   FOTO: 1/ 3    Precautions: Standard     Time In: 9:00  Time Out: 10:00  Total Billable Time: 50 minutes    Subjective     Date of onset: one year ago  History of current condition - Herman reports: pain in the back and back of upper thighs.  tingling is in the back of both legs which is becoming more annoying.  Pt says he was managing pain with Tylenol initially but pain is now worse.  Pt also reports left knee arthritis and decreased range of motion  Pt denies bowel or bladder changes, no changes in sensation, no changes in hand writing or fine motor control    Falls: no    Imaging: x-ray:   EXAMINATION:  XR LUMBAR SPINE 5 VIEW WITH FLEX AND EXT     CLINICAL HISTORY:  Low back pain, unspecified     TECHNIQUE:  AP, lateral, and oblique images are performed through the lumbar spine.     COMPARISON:  06/17/2004     FINDINGS:  Lumbar vertebral body heights are maintained.     Disc space narrowing and endplate changes L4-5 and L5-S1.  Facet arthropathy L3-4 through L5-S1.     Grade 1 anterolisthesis L4-5 with no significant change with flexion or extension.  Slight grade 1 retrolisthesis L5-S1 with no significant change with flexion or extension..     Impression:     Degenerative change lower lumbar spine with spondylolisthesis L4-5 and  "L5-S1    Prior Therapy: yes  Social History:  lives alone, single story home, 2 steps to enter without rails  Occupation: retired  Prior Level of Function: independent  Current Level of Function: independent, sometimes needs to put off cutting grass because of pain, makes sure grocery bags are not too heavy    Pain:  Current 0/10, worst 6/10, best 0/10   Location: bilateral low back, back of thighs   Description: Tingling  Aggravating Factors: Bending  Easing Factors:  Tylenol, waiting for pain to go away    Patients goals: "live my life without this trouble"     Medical History:   Past Medical History:   Diagnosis Date    Appendicitis     Benign neoplasm of colon     Cataract     Chronic rhinitis     Colon polyps     Glaucoma     Hyperlipidemia     IFG (impaired fasting glucose) 1/8/2016    Lumbar stenosis     Osteoarthritis of both knees 5/24/2018    Tubular adenoma of colon: 4/16 repeat 2021 4/7/2017       Surgical History:   Herman Chirinos  has a past surgical history that includes Appendectomy; Colonoscopy (2007); Colonoscopy (N/A, 4/7/2016); Colonoscopy (N/A, 7/8/2021); Radiofrequency ablation (Left, 6/6/2023); and Cataract extraction w/  intraocular lens implant (Right, 10/3/2023).    Medications:   Herman has a current medication list which includes the following prescription(s): aspirin, cholecalciferol (vitamin d3), latanoprost, loratadine, pravastatin, tadalafil, and tamsulosin.    Allergies:   Review of patient's allergies indicates:  No Known Allergies     Objective      Observation: mildly antalgic gait, decreased left weight bearing    Posture:  poor sitting posture, mild forward flexed posture in standing    Lumbar Range of Motion:    Degrees Pain   Flexion Minimal loss   Tightness across low back     Extension Minimal loss   Mild pain        Left Side Bending Minimal loss tightness        Right Side Bending Minimal loss tightness        Left rotation   Minimal loss tightness        Right " "Rotation   Minimal loss tightness           Bilateral hip range of motion - WFL  Knee range of motion     Right 0-130 degrees   Left 0-7-86 active, 93 passive    Lower Extremity Strength  Right LE  Left LE    Knee extension: 5/5 Knee extension: 5/5   Knee flexion: 4+/5 Knee flexion: 4+/5   Hip flexion: 4+/5 Hip flexion: 4+/5   Hip extension:  4+/5 Hip extension: 4+/5   Hip abduction: 4+/5 Hip abduction: 4+/5   Hip adduction: 4+/5 Hip adduction 4+/5   Ankle dorsiflexion: 4+/5 Ankle dorsiflexion: 4+/5   Ankle plantarflexion: 4/5 Ankle plantarflexion: 4/5     Single Leg Stance: right 26 sec ; left 20 sec  30 Second sit to stand - 8 reps with push off from thighs, right leg dominant      Special Tests:  -Repeated Flexion: increased low back discomfort   -Repeated Ext: increased low back discomfort  -Piriformis Test: negative  - Slump - negative  - SLR - negative    DTR:   Right Left Comment   Patellar (L3-4) 2+ 2+    Achilles (S1) 2+ 2+    Toe Walk - able  Heel walk - able      Joint Mobility: decreased  lower thoracic and lumbar spine    Palpation: + pain with palpation of lumbar paraspinals    Sensation: intact to light touch    Flexibility: moderate limitation bilateral hamstrings, hip flexors and gluteal musculature         Intake Outcome Measure for FOTO Lumbar Survey    Therapist reviewed FOTO scores for Herman Chirinos on 3/7/2024.   FOTO report - see Media section or FOTO account episode details.    Intake Score: 52% functional ability  Goal: 62% functional ability         Treatment     Total Treatment time (time-based codes) separate from Evaluation: 15 minutes     Herman received the treatments listed below:      therapeutic exercises to develop ROM and flexibility for 10 minutes including:  - lower trunk rotations x 10  - piriformis stretch 20" x 3 bilaterally  - seated hamstring stretch 20" x 3 bilaterally     Patient Education and Home Exercises     Education provided:   - reviewed anatomy / physiology " related to diagnosis  - role of PT, PT POC and goals  - attendance policy     Written Home Exercises Provided: yes. Exercises were reviewed and Herman was able to demonstrate them prior to the end of the session.  Herman demonstrated good  understanding of the education provided. See EMR under Patient Instructions for exercises provided during therapy sessions.    Assessment     Herman is a 73 y.o. male referred to outpatient Physical Therapy with a medical diagnosis of M54.50,G89.29 (ICD-10-CM) - Chronic bilateral low back pain without sciatica M54.16 (ICD-10-CM) - Lumbar radiculopathy. Patient presents with decreased lumbar range of motion and strength, poor posture, decreased balance, mild gait deficits and functional deficits with bending, lifting and heavy ADLs including yard work and household tasks.     Patient prognosis is Good.   Patient will benefit from skilled outpatient Physical Therapy to address the deficits stated above and in the chart below, provide patient /family education, and to maximize patientt's level of independence.     Plan of care discussed with patient: Yes  Patient's spiritual, cultural and educational needs considered and patient is agreeable to the plan of care and goals as stated below:     Anticipated Barriers for therapy: none    Medical Necessity is demonstrated by the following  History  Co-morbidities and personal factors that may impact the plan of care [] LOW: no personal factors / co-morbidities  [] MODERATE: 1-2 personal factors / co-morbidities  [x] HIGH: 3+ personal factors / co-morbidities    Moderate / High Support Documentation:   Co-morbidities affecting plan of care: spinal stenosis and OA    Personal Factors:   no deficits     Examination  Body Structures and Functions, activity limitations and participation restrictions that may impact the plan of care [] LOW: addressing 1-2 elements  [x] MODERATE: 3+ elements  [] HIGH: 4+ elements (please support  below)    Moderate / High Support Documentation: Based on PMHX, co morbidities , data from assessments and functional level of assistance required with task and clinical presentation directly impacting function.         Clinical Presentation [x] LOW: stable  [] MODERATE: Evolving  [] HIGH: Unstable     Decision Making/ Complexity Score: low       Goals:  Short Term Goals: 4 weeks   1. Patient will demonstrate good transverse abdominal muscle contraction for improved deep abdominal strength and lumbar stability.  2. Increase lumbar ROM to 100% of WNL in order to improve functional mobility.     3. Patient will demonstrate good sitting/standing posture and body mechanics for improved spine health and decreased risk of future injury.   4. Patient will improve bilateral lower extremity MMT grades by >/=1/2 grade in order to improve strength for standing ADLs.   5. Patient will score >/= 10 repetitions on 30-Second Sit to  order to improve bilateral lower extremity muscular power for transfers.   6. Patient will be compliant with home exercise program to supplement therapy in promoting functional mobility.    Long Term Goals: 8 weeks  1. Patient will improve FOTO score to </= 38% limited to decrease perceived limitation with maintaining/changing body position.   2. Patient will be 100% compliant with updated HEP in order to maximize PT benefits post-discharge.  3. Patient will improve bilateral lower extremity MMT grades by >/=1 grade in order to improve strength for standing activities of daily living.  4. Patient will score >/= 12 repetitions on 30-Second Sit to  order to improve bilateral lower extremity muscular power for transfers.    5. Patient will report pain at worst over 2-week period as </=2/10 in order to improve general activity tolerance.   6. Pt will begin some form of home/community fitness in order to sustain progress gained in PT      Plan     Plan of care Certification: 3/7/2024 to  5/3/2024.    Outpatient Physical Therapy 2 times weekly for 8 weeks to include the following interventions: Aquatic Therapy, Electrical Stimulation PRN, Gait Training, Manual Therapy, Moist Heat/ Ice, Neuromuscular Re-ed, Patient Education, Self Care, Therapeutic Activities, Therapeutic Exercise, and functional dry needling PRN .     Shobha Mendes PT        Physician's Signature: _________________________________________ Date: ________________

## 2024-03-11 NOTE — PROGRESS NOTES
"[facility]   Physical Therapy Treatment Note      Name: Herman Chirinos  Clinic Number: [id number]    Therapy Diagnosis: No diagnosis found.  Physician: Yoana Cleveland MD    Visit Date: 3/12/2024    Physician Orders: PT Eval and Treat   Medical Diagnosis from Referral: M54.50,G89.29 (ICD-10-CM) - Chronic bilateral low back pain without sciatica M54.16 (ICD-10-CM) - Lumbar radiculopathy  Evaluation Date: 3/7/2024  Authorization Period Expiration: 2/18/2025  Plan of Care Expiration: 5/3/2024  Progress Note Due: 4/4/2025  Date of Surgery: n/a  Visit # / Visits authorized: 1/1; 1/20    FOTO: 1/ 3     Precautions: Standard      PTA Visit #: ***/5     Time In: ***  Time Out: ***  Total Billable Time: *** minutes    Subjective     Pt reports: ***.  He {Actions; was/was not:01311} compliant with home exercise program.  Response to previous treatment: initial eval   Functional change: none     Pain: {0-10:20507::"0"}/10  Location: low back pain     Objective      Objective Measures updated at progress report unless specified.     Treatment     Herman received the treatments listed below:      Lower Extremity Strength  Right LE   Left LE     Knee extension: 5/5 Knee extension: 5/5   Knee flexion: 4+/5 Knee flexion: 4+/5   Hip flexion: 4+/5 Hip flexion: 4+/5   Hip extension:  4+/5 Hip extension: 4+/5   Hip abduction: 4+/5 Hip abduction: 4+/5   Hip adduction: 4+/5 Hip adduction 4+/5   Ankle dorsiflexion: 4+/5 Ankle dorsiflexion: 4+/5   Ankle plantarflexion: 4/5 Ankle plantarflexion: 4/5     therapeutic exercises to develop {AMB PT PROGRESS OBJECTIVE:31011} for *** minutes including:  ***    manual therapy techniques: {AMB PT PROGRESS MANUAL THERAPY:42726} were applied to the: *** for *** minutes, including:  ***    neuromuscular re-education activities to improve: {AMB PT PROGRESS NEURO RE-ED:64665} for *** minutes. The following activities were included:  ***    therapeutic activities to improve " functional performance for ***  minutes, including:  ***    gait training to improve functional mobility and safety for ***  minutes, including:  ***    direct contact modalities after being cleared for contraindications: {AMB PT PROGRESS DIRECT CONTACT MODES:94550}    supervised modalities after being cleared for contradictions: {AMB PT SUPERVISED MODES:17221}    hot pack for *** minutes to ***.    cold pack for *** minutes to ***.    Patient Education and Home Exercises       Education provided:   - reviewed anatomy / physiology related to diagnosis  - role of PT, PT POC and goals  - attendance policy      Written Home Exercises Provided: yes. Exercises were reviewed and Herman was able to demonstrate them prior to the end of the session.  Herman demonstrated good  understanding of the education provided. See EMR under Patient Instructions for exercises provided during therapy sessions.    Assessment     ***    Herman Is progressing well towards his goals.   Pt prognosis is Good.     Pt will continue to benefit from skilled outpatient physical therapy to address the deficits listed in the problem list box on initial evaluation, provide pt/family education and to maximize pt's level of independence in the home and community environment.     Pt's spiritual, cultural and educational needs considered and pt agreeable to plan of care and goals.     Anticipated barriers to physical therapy: none     Goals:     Short Term Goals: 4 weeks   1. Patient will demonstrate good transverse abdominal muscle contraction for improved deep abdominal strength and lumbar stability.  2. Increase lumbar ROM to 100% of WNL in order to improve functional mobility.     3. Patient will demonstrate good sitting/standing posture and body mechanics for improved spine health and decreased risk of future injury.   4. Patient will improve bilateral lower extremity MMT grades by >/=1/2 grade in order to improve strength for standing ADLs.   5.  Patient will score >/= 10 repetitions on 30-Second Sit to  order to improve bilateral lower extremity muscular power for transfers.   6. Patient will be compliant with home exercise program to supplement therapy in promoting functional mobility.     Long Term Goals: 8 weeks  1. Patient will improve FOTO score to </= 38% limited to decrease perceived limitation with maintaining/changing body position.   2. Patient will be 100% compliant with updated HEP in order to maximize PT benefits post-discharge.  3. Patient will improve bilateral lower extremity MMT grades by >/=1 grade in order to improve strength for standing activities of daily living.  4. Patient will score >/= 12 repetitions on 30-Second Sit to  order to improve bilateral lower extremity muscular power for transfers.    5. Patient will report pain at worst over 2-week period as </=2/10 in order to improve general activity tolerance.   6. Pt will begin some form of home/community fitness in order to sustain progress gained in PT    Plan     Continue with Physical Therapist Plan of Care.     PT/PTA met face to face to discuss pt's treatment plan and progress towards established goals. Pt will be seen by a physical therapist minimally every 6th visit or every 30 days.      Adi Barnhart PTA

## 2024-03-12 ENCOUNTER — CLINICAL SUPPORT (OUTPATIENT)
Dept: REHABILITATION | Facility: HOSPITAL | Age: 74
End: 2024-03-12
Payer: MEDICARE

## 2024-03-12 DIAGNOSIS — G89.29 CHRONIC BILATERAL LOW BACK PAIN WITHOUT SCIATICA: Primary | ICD-10-CM

## 2024-03-12 DIAGNOSIS — M54.50 CHRONIC BILATERAL LOW BACK PAIN WITHOUT SCIATICA: Primary | ICD-10-CM

## 2024-03-12 DIAGNOSIS — M54.16 LUMBAR RADICULOPATHY: ICD-10-CM

## 2024-03-12 PROCEDURE — 97110 THERAPEUTIC EXERCISES: CPT

## 2024-03-12 PROCEDURE — 97530 THERAPEUTIC ACTIVITIES: CPT

## 2024-03-12 NOTE — PROGRESS NOTES
"[facility]   Physical Therapy Treatment Note      Name: Herman Chirinos  Clinic Number: [id number]    Therapy Diagnosis:   Encounter Diagnoses   Name Primary?    Chronic bilateral low back pain without sciatica Yes    Lumbar radiculopathy      Physician: Yoana Cleveland MD    Visit Date: 3/12/2024    Physician Orders: PT Eval and Treat   Medical Diagnosis from Referral: M54.50,G89.29 (ICD-10-CM) - Chronic bilateral low back pain without sciatica M54.16 (ICD-10-CM) - Lumbar radiculopathy  Evaluation Date: 3/7/2024  Authorization Period Expiration: 2/18/2025  Plan of Care Expiration: 5/3/2024  Progress Note Due: 4/4/2025  Date of Surgery: n/a  Visit # / Visits authorized: 1/ 1   FOTO: 1/ 3    PTA Visit #: 0/5     Precautions: Standard      Time In: 10:06 AM  Time Out: 11:00 AM  Total Billable Time: 54 minutes    Subjective     Patient reports: stiffness in low back at start of session.  He was compliant with home exercise program.  Response to previous treatment: no adverse response to IE  Functional change: none yet reported    Pain: 2/10  Location: bilateral low back     Objective      Objective Measures updated at progress report unless specified.     Treatment     Herman received the treatments listed below:      therapeutic exercises to develop strength, ROM, flexibility, posture, and core stabilization for 39 minutes including:  - NuStep x 5 min   - LTR x 10  - piriformis stretch 20" x 3 bilaterally  - supine 90/90 hamstring active stretch x 15 reps bilaterally  - bridge with green band 2 x 10  - BKFO with GTB with transverse abdominal isometric 2 x 10 reps  - side lying clam with GTB 2 x 10 reps  - SLR with transverse abdominal isometric on theraball 2 x 10 bilaterally  - seated sciatic nerve glide x 15 reps bilaterally  - seated lumbar flexion x 10 forward, x 5 to each side    therapeutic activities to improve functional performance for 15  minutes, including:  - sit to stand from 20" " mat without upper extremity assist 2 x 10 reps  - resisted side stepping with RTB 10' x 8   - reviewed home exercise program, added seated sciatic nerve glides      Patient Education and Home Exercises       Education provided:   - HEP review  - purpose of exercises performed    Written Home Exercises Provided: yes. Exercises were reviewed and Herman was able to demonstrate them prior to the end of the session.  Herman demonstrated good  understanding of the education provided. See Electronic Medical Record under Patient Instructions for exercises provided during therapy sessions    Assessment     Herman tolerated first full follow up visit well.  Pt did report some increase in bilateral radicular symptoms after mat program but symptoms reduced with seated sciatic nerve glide.  Will hold supine active hamstring stretch to reduce nerve irritation next visit.  Pt with subjective improvement in mobility at completion of session.     Herman Is progressing well towards his goals.   Patient prognosis is Good.     Patient will continue to benefit from skilled outpatient physical therapy to address the deficits listed in the problem list box on initial evaluation, provide pt/family education and to maximize pt's level of independence in the home and community environment.     Patient's spiritual, cultural and educational needs considered and pt agreeable to plan of care and goals.     Anticipated barriers to physical therapy: none    Goals:   Short Term Goals: 4 weeks   1. Patient will demonstrate good transverse abdominal muscle contraction for improved deep abdominal strength and lumbar stability. (PROGRESSING, NOT MET)   2. Increase lumbar ROM to 100% of WNL in order to improve functional mobility.   (PROGRESSING, NOT MET)   3. Patient will demonstrate good sitting/standing posture and body mechanics for improved spine health and decreased risk of future injury. (PROGRESSING, NOT MET)   4. Patient will improve bilateral  lower extremity MMT grades by >/=1/2 grade in order to improve strength for standing ADLs.  (PROGRESSING, NOT MET)   5. Patient will score >/= 10 repetitions on 30-Second Sit to  order to improve bilateral lower extremity muscular power for transfers.  (PROGRESSING, NOT MET)   6. Patient will be compliant with home exercise program to supplement therapy in promoting functional mobility. (PROGRESSING, NOT MET)      Long Term Goals: 8 weeks  1. Patient will improve FOTO score to </= 38% limited to decrease perceived limitation with maintaining/changing body position. (PROGRESSING, NOT MET)   2. Patient will be 100% compliant with updated HEP in order to maximize PT benefits post-discharge. (PROGRESSING, NOT MET)   3. Patient will improve bilateral lower extremity MMT grades by >/=1 grade in order to improve strength for standing activities of daily living. (PROGRESSING, NOT MET)   4. Patient will score >/= 12 repetitions on 30-Second Sit to  order to improve bilateral lower extremity muscular power for transfers.  (PROGRESSING, NOT MET)   5. Patient will report pain at worst over 2-week period as </=2/10 in order to improve general activity tolerance. (PROGRESSING, NOT MET)   6. Pt will begin some form of home/community fitness in order to sustain progress gained in physical therapy. (PROGRESSING, NOT MET)     Plan     Continue present POC.     Shobha Mendes, PT

## 2024-03-14 ENCOUNTER — CLINICAL SUPPORT (OUTPATIENT)
Dept: REHABILITATION | Facility: HOSPITAL | Age: 74
End: 2024-03-14
Payer: MEDICARE

## 2024-03-14 DIAGNOSIS — G89.29 CHRONIC BILATERAL LOW BACK PAIN WITHOUT SCIATICA: Primary | ICD-10-CM

## 2024-03-14 DIAGNOSIS — M54.16 LUMBAR RADICULOPATHY: ICD-10-CM

## 2024-03-14 DIAGNOSIS — M54.50 CHRONIC BILATERAL LOW BACK PAIN WITHOUT SCIATICA: Primary | ICD-10-CM

## 2024-03-14 PROCEDURE — 97530 THERAPEUTIC ACTIVITIES: CPT | Mod: CQ

## 2024-03-14 PROCEDURE — 97110 THERAPEUTIC EXERCISES: CPT | Mod: CQ

## 2024-03-14 NOTE — PROGRESS NOTES
"[facility]   Physical Therapy Treatment Note      Name: Herman Chirinos  Clinic Number: [id number]    Therapy Diagnosis: No diagnosis found.  Physician: Yoana Cleveland MD    Visit Date: 3/14/2024    Physician Orders: PT Eval and Treat   Medical Diagnosis from Referral: M54.50,G89.29 (ICD-10-CM) - Chronic bilateral low back pain without sciatica M54.16 (ICD-10-CM) - Lumbar radiculopathy  Evaluation Date: 3/7/2024  Authorization Period Expiration: 2/18/2025  Plan of Care Expiration: 5/3/2024  Progress Note Due: 4/4/2025  Date of Surgery: n/a  Visit # / Visits authorized: 1/1; 2/20   FOTO: 1/ 3     PTA Visit #: 0/5      Precautions: Standard      PTA Visit #: 1/5     Time In: 10:00 AM   Time Out: 11:00 AM   Total Billable Time: 60 minutes    Subjective     Patient reports: no increased or reproduced pain throughout or after last treatment session.     He was compliant with home exercise program.  Response to previous treatment: no adverse response to IE  Functional change: none yet reported     Pain: 2/10  Location: bilateral low back     Objective      Objective Measures updated at progress report unless specified.     Treatment     Herman received the treatments listed below:      therapeutic exercises to develop strength, ROM, flexibility, posture, and core stabilization for 39 minutes including:    - NuStep x 5 min   - LTR x 20  - piriformis stretch 30" x 3 bilaterally  - supine 90/90 hamstring active stretch x 15 reps bilaterally (hold)   - bridge with green band 2 x 10  - BKFO with green thera band with transverse abdominal isometric 2 x 10 reps  - side lying clam with green thera band 2 x 10 reps  - SLR with transverse abdominal isometric on theraball 2 x 10 bilaterally  - seated sciatic nerve glide x 15 reps bilaterally  - seated lumbar flexion 2 x 10 forward, 2 x 5 to each side     therapeutic activities to improve functional performance for 15  minutes, including:    - sit to stand " "from 20" mat without upper extremity assist 2 x 10 reps  - resisted side stepping with RTB 10' x 8   - forward steps ups on bottom step x 10 each     Patient Education and Home Exercises       Education provided:   - HEP review  - purpose of exercises performed     Written Home Exercises Provided: yes. Exercises were reviewed and Herman was able to demonstrate them prior to the end of the session.  Herman demonstrated good  understanding of the education provided. See Electronic Medical Record under Patient Instructions for exercises provided during therapy sessions    Assessment     Patient was able to complete therapeutic exercise without reports of increased or reproduced pain throughout or after Physical Therapy treatment. Patient felt better with seat nerve glides today than previous treatment session. Patient require verbal cues with sidelying hip abduction due to patient with rotation throughout task. Will continue to monitor and progress patient as tolerated.     Herman Is progressing well towards his goals.   Pt prognosis is Good.     Pt will continue to benefit from skilled outpatient physical therapy to address the deficits listed in the problem list box on initial evaluation, provide pt/family education and to maximize pt's level of independence in the home and community environment.     Pt's spiritual, cultural and educational needs considered and pt agreeable to plan of care and goals.     Anticipated barriers to physical therapy:  none     Goals:   Short Term Goals: 4 weeks   1. Patient will demonstrate good transverse abdominal muscle contraction for improved deep abdominal strength and lumbar stability. (PROGRESSING, NOT MET)   2. Increase lumbar ROM to 100% of WNL in order to improve functional mobility.   (PROGRESSING, NOT MET)   3. Patient will demonstrate good sitting/standing posture and body mechanics for improved spine health and decreased risk of future injury. (PROGRESSING, NOT MET)   4. " Patient will improve bilateral lower extremity MMT grades by >/=1/2 grade in order to improve strength for standing ADLs.  (PROGRESSING, NOT MET)   5. Patient will score >/= 10 repetitions on 30-Second Sit to  order to improve bilateral lower extremity muscular power for transfers.  (PROGRESSING, NOT MET)   6. Patient will be compliant with home exercise program to supplement therapy in promoting functional mobility. (PROGRESSING, NOT MET)      Long Term Goals: 8 weeks  1. Patient will improve FOTO score to </= 38% limited to decrease perceived limitation with maintaining/changing body position. (PROGRESSING, NOT MET)   2. Patient will be 100% compliant with updated HEP in order to maximize PT benefits post-discharge. (PROGRESSING, NOT MET)   3. Patient will improve bilateral lower extremity MMT grades by >/=1 grade in order to improve strength for standing activities of daily living. (PROGRESSING, NOT MET)   4. Patient will score >/= 12 repetitions on 30-Second Sit to  order to improve bilateral lower extremity muscular power for transfers.  (PROGRESSING, NOT MET)   5. Patient will report pain at worst over 2-week period as </=2/10 in order to improve general activity tolerance. (PROGRESSING, NOT MET)   6. Pt will begin some form of home/community fitness in order to sustain progress gained in physical therapy. (PROGRESSING, NOT MET)     Plan     Continue with Physical Therapist Plan of Care.    PT/PTA met face to face to discuss pt's treatment plan and progress towards established goals. Pt will be seen by a physical therapist minimally every 6th visit or every 30 days.    Adi Barnhart PTA

## 2024-03-18 ENCOUNTER — CLINICAL SUPPORT (OUTPATIENT)
Dept: REHABILITATION | Facility: HOSPITAL | Age: 74
End: 2024-03-18
Payer: MEDICARE

## 2024-03-18 DIAGNOSIS — M54.16 LUMBAR RADICULOPATHY: ICD-10-CM

## 2024-03-18 DIAGNOSIS — G89.29 CHRONIC BILATERAL LOW BACK PAIN WITHOUT SCIATICA: Primary | ICD-10-CM

## 2024-03-18 DIAGNOSIS — M54.50 CHRONIC BILATERAL LOW BACK PAIN WITHOUT SCIATICA: Primary | ICD-10-CM

## 2024-03-18 PROCEDURE — 97530 THERAPEUTIC ACTIVITIES: CPT | Mod: CQ

## 2024-03-18 PROCEDURE — 97110 THERAPEUTIC EXERCISES: CPT | Mod: CQ

## 2024-03-18 NOTE — PROGRESS NOTES
"[facility]   Physical Therapy Treatment Note      Name: Herman Chirinos  Clinic Number: [id number]    Therapy Diagnosis:   Encounter Diagnoses   Name Primary?    Chronic bilateral low back pain without sciatica Yes    Lumbar radiculopathy      Physician: Yoana Cleveland MD    Visit Date: 3/18/2024    Physician Orders: PT Eval and Treat   Medical Diagnosis from Referral: M54.50,G89.29 (ICD-10-CM) - Chronic bilateral low back pain without sciatica M54.16 (ICD-10-CM) - Lumbar radiculopathy  Evaluation Date: 3/7/2024  Authorization Period Expiration: 2/18/2025  Plan of Care Expiration: 5/3/2024  Progress Note Due: 4/4/2025  Date of Surgery: n/a  Visit # / Visits authorized: 1/1; 3/20   FOTO: 1/ 3     Precautions: Standard      PTA Visit #: 2/5     Time In: 10:00 AM   Time Out: 10:53 AM   Total Billable Time: 53 minutes    Subjective     Patient reports: feeling better after each treatment, however, patient still with some discomfort throughout LB.     He was compliant with home exercise program.  Response to previous treatment: no adverse response to IE  Functional change: none yet reported     Pain: 2/10  Location: bilateral low back     Objective      Objective Measures updated at progress report unless specified.     Treatment     Herman received the treatments listed below:      therapeutic exercises to develop strength, ROM, flexibility, posture, and core stabilization for 39 minutes including:    - NuStep x 5 min   - lower trunk rotations x 20  - piriformis stretch 30" x 3 bilaterally  - supine 90/90 hamstring active stretch x 15 reps bilaterally (hold)   - bridge with green band 2 x 10  - bend knee fallout with green thera band with transverse abdominal isometric 2 x 10 reps  - side lying clam with green thera band 2 x 10 reps  - straight leg raises with transverse abdominal isometric on theraball 2 x 10 bilaterally  - seated sciatic nerve glide x 15 reps bilaterally  - seated lumbar " "flexion 2 x 10 forward, 2 x 5 to each side     therapeutic activities to improve functional performance for 15  minutes, including:    - sit to stand from 20" mat without upper extremity assist 2 x 10 reps  - resisted side stepping with RTB 10' x 8   - forward steps ups on bottom step x 10 each     Patient Education and Home Exercises       Education provided:   - HEP review  - purpose of exercises performed     Written Home Exercises Provided: yes. Exercises were reviewed and Herman was able to demonstrate them prior to the end of the session.  Herman demonstrated good  understanding of the education provided. See Electronic Medical Record under Patient Instructions for exercises provided during therapy sessions    Assessment     Patient continues to complete therapeutic exercise without reports of increased or reproduced pain throughout or after Physical Therapy treatment. Patient continues to complete sit to stands without increased or reproduced pain, however, patient with increased fatigue. Patient continues to feel good after lumbar flexion stretch with ball.     Herman Is progressing well towards his goals.   Pt prognosis is Good.     Pt will continue to benefit from skilled outpatient physical therapy to address the deficits listed in the problem list box on initial evaluation, provide pt/family education and to maximize pt's level of independence in the home and community environment.     Pt's spiritual, cultural and educational needs considered and pt agreeable to plan of care and goals.     Anticipated barriers to physical therapy:  none     Goals:   Short Term Goals: 4 weeks   1. Patient will demonstrate good transverse abdominal muscle contraction for improved deep abdominal strength and lumbar stability. (PROGRESSING, NOT MET)   2. Increase lumbar ROM to 100% of WNL in order to improve functional mobility.   (PROGRESSING, NOT MET)   3. Patient will demonstrate good sitting/standing posture and body " mechanics for improved spine health and decreased risk of future injury. (PROGRESSING, NOT MET)   4. Patient will improve bilateral lower extremity MMT grades by >/=1/2 grade in order to improve strength for standing ADLs.  (PROGRESSING, NOT MET)   5. Patient will score >/= 10 repetitions on 30-Second Sit to  order to improve bilateral lower extremity muscular power for transfers.  (PROGRESSING, NOT MET)   6. Patient will be compliant with home exercise program to supplement therapy in promoting functional mobility. (PROGRESSING, NOT MET)      Long Term Goals: 8 weeks  1. Patient will improve FOTO score to </= 38% limited to decrease perceived limitation with maintaining/changing body position. (PROGRESSING, NOT MET)   2. Patient will be 100% compliant with updated HEP in order to maximize PT benefits post-discharge. (PROGRESSING, NOT MET)   3. Patient will improve bilateral lower extremity MMT grades by >/=1 grade in order to improve strength for standing activities of daily living. (PROGRESSING, NOT MET)   4. Patient will score >/= 12 repetitions on 30-Second Sit to  order to improve bilateral lower extremity muscular power for transfers.  (PROGRESSING, NOT MET)   5. Patient will report pain at worst over 2-week period as </=2/10 in order to improve general activity tolerance. (PROGRESSING, NOT MET)   6. Pt will begin some form of home/community fitness in order to sustain progress gained in physical therapy. (PROGRESSING, NOT MET)     Plan     Continue with Physical Therapist Plan of Care.    PT/PTA met face to face to discuss pt's treatment plan and progress towards established goals. Pt will be seen by a physical therapist minimally every 6th visit or every 30 days.    Adi Barnhart PTA

## 2024-03-21 ENCOUNTER — CLINICAL SUPPORT (OUTPATIENT)
Dept: REHABILITATION | Facility: HOSPITAL | Age: 74
End: 2024-03-21
Payer: MEDICARE

## 2024-03-21 DIAGNOSIS — M48.061 SPINAL STENOSIS OF LUMBAR REGION WITHOUT NEUROGENIC CLAUDICATION: Primary | ICD-10-CM

## 2024-03-21 PROCEDURE — 97110 THERAPEUTIC EXERCISES: CPT

## 2024-03-21 PROCEDURE — 97530 THERAPEUTIC ACTIVITIES: CPT

## 2024-03-21 PROCEDURE — 97140 MANUAL THERAPY 1/> REGIONS: CPT

## 2024-03-21 NOTE — PROGRESS NOTES
"[facility]   Physical Therapy Treatment Note      Name: Herman Chirinos  Clinic Number: [id number]    Therapy Diagnosis:   Encounter Diagnosis   Name Primary?    Spinal stenosis of lumbar region: see MRI 2006 Yes       Physician: Yoana Cleveland MD    Visit Date: 3/21/2024    Physician Orders: PT Eval and Treat   Medical Diagnosis from Referral: M54.50,G89.29 (ICD-10-CM) - Chronic bilateral low back pain without sciatica M54.16 (ICD-10-CM) - Lumbar radiculopathy  Evaluation Date: 3/7/2024  Authorization Period Expiration: 2/18/2025  Plan of Care Expiration: 5/3/2024  Progress Note Due: 4/4/2025  Date of Surgery: n/a  Visit # / Visits authorized: 1/1; 4/20   FOTO: 1/ 3     Precautions: Standard      PTA Visit #: 2/5     Time In: 10:00 AM   Time Out: 10:55 AM   Total Billable Time: 55 minutes    Subjective     Patient reports: that his symptoms mainly come on with lumbar flexion activities around house or in yard. He reports he is able to do more before symptom onset.    He was compliant with home exercise program.  Response to previous treatment: no adverse response to IE  Functional change: none yet reported     Pain: 2/10  Location: bilateral low back     Objective      +SLR of RLE at 30 degrees hip flexion  Increased kyphosis thru thoracic spine    Treatment     Herman received the treatments listed below:      therapeutic exercises to develop strength, ROM, flexibility, posture, and core stabilization for 30 minutes including:    - NuStep x 5 min   - seated thoracic extension 20x 5 sec hold  - seated sciatic nerve glide 20x bilatearlly  - seated hamstring stretch 5x30s  - lower trunk rotations x 20  - bridge with green band 3 x 10  - side lying clam with green thera band 2 x 10 reps  - sidelying open books 10x both sides  - seated CTJ mobilization 20x 5 sec hold    NT:  - piriformis stretch 30" x 3 bilaterally  - supine 90/90 hamstring active stretch x 15 reps bilaterally (hold)   - bend " "knee fallout with green thera band with transverse abdominal isometric 2 x 10 reps  - straight leg raises with transverse abdominal isometric on theraball 2 x 10 bilaterally  - seated sciatic nerve glide x 15 reps bilaterally  - seated lumbar flexion 2 x 10 forward, 2 x 5 to each side     therapeutic activities to improve functional performance for 15  minutes, including:    - sit to stand from 20" mat without upper extremity assist 2 x 10 reps  - resisted side stepping with GTB 5 laps along mat  - forward steps ups on bottom step x 10 each     Herman received the following manual therapy techniques: Joint mobilizations and Manual traction were applied to the: lumbar spine for 10 minutes, including:    Prone thoracic mobilization PA grade 3-4  Supine lumbar traction unilateral     Patient Education and Home Exercises       Education provided:   - HEP review  - benefits of nerve mobilization and reasoning  - avoidance of aggravating factors that place increased tension on nerve such as driving     Written Home Exercises Provided: yes. Exercises were reviewed and Herman was able to demonstrate them prior to the end of the session.  Herman demonstrated good  understanding of the education provided. See Electronic Medical Record under Patient Instructions for exercises provided during therapy sessions    Assessment     Patient continues to present with adverse neural tension bilaterally with positive SLR testing. Patient responds well to neural mobilizations. Patient hypomobility of thoracic spine and flexed posture. Patient given interventions to address thoracic mobility with improved neural tension following. Home exercise program updated to address deficits along with education on avoiding increased neural tension with daily activity modifications.    Herman Is progressing well towards his goals.   Pt prognosis is Good.     Pt will continue to benefit from skilled outpatient physical therapy to address the deficits " listed in the problem list box on initial evaluation, provide pt/family education and to maximize pt's level of independence in the home and community environment.     Pt's spiritual, cultural and educational needs considered and pt agreeable to plan of care and goals.     Anticipated barriers to physical therapy:  none     Goals:   Short Term Goals: 4 weeks   1. Patient will demonstrate good transverse abdominal muscle contraction for improved deep abdominal strength and lumbar stability. (PROGRESSING, NOT MET)   2. Increase lumbar ROM to 100% of WNL in order to improve functional mobility.   (PROGRESSING, NOT MET)   3. Patient will demonstrate good sitting/standing posture and body mechanics for improved spine health and decreased risk of future injury. (PROGRESSING, NOT MET)   4. Patient will improve bilateral lower extremity MMT grades by >/=1/2 grade in order to improve strength for standing ADLs.  (PROGRESSING, NOT MET)   5. Patient will score >/= 10 repetitions on 30-Second Sit to  order to improve bilateral lower extremity muscular power for transfers.  (PROGRESSING, NOT MET)   6. Patient will be compliant with home exercise program to supplement therapy in promoting functional mobility. (PROGRESSING, NOT MET)      Long Term Goals: 8 weeks  1. Patient will improve FOTO score to </= 38% limited to decrease perceived limitation with maintaining/changing body position. (PROGRESSING, NOT MET)   2. Patient will be 100% compliant with updated HEP in order to maximize PT benefits post-discharge. (PROGRESSING, NOT MET)   3. Patient will improve bilateral lower extremity MMT grades by >/=1 grade in order to improve strength for standing activities of daily living. (PROGRESSING, NOT MET)   4. Patient will score >/= 12 repetitions on 30-Second Sit to  order to improve bilateral lower extremity muscular power for transfers.  (PROGRESSING, NOT MET)   5. Patient will report pain at worst over 2-week period  as </=2/10 in order to improve general activity tolerance. (PROGRESSING, NOT MET)   6. Pt will begin some form of home/community fitness in order to sustain progress gained in physical therapy. (PROGRESSING, NOT MET)     Plan     Continue with Physical Therapist Plan of Care.    Roberto Ash, PT

## 2024-03-25 ENCOUNTER — CLINICAL SUPPORT (OUTPATIENT)
Dept: REHABILITATION | Facility: HOSPITAL | Age: 74
End: 2024-03-25
Payer: MEDICARE

## 2024-03-25 DIAGNOSIS — M48.061 SPINAL STENOSIS OF LUMBAR REGION WITHOUT NEUROGENIC CLAUDICATION: Primary | ICD-10-CM

## 2024-03-25 DIAGNOSIS — G89.29 CHRONIC BILATERAL LOW BACK PAIN WITHOUT SCIATICA: ICD-10-CM

## 2024-03-25 DIAGNOSIS — M54.50 CHRONIC BILATERAL LOW BACK PAIN WITHOUT SCIATICA: ICD-10-CM

## 2024-03-25 DIAGNOSIS — M54.16 LUMBAR RADICULOPATHY: ICD-10-CM

## 2024-03-25 PROCEDURE — 97110 THERAPEUTIC EXERCISES: CPT | Mod: CQ

## 2024-03-25 PROCEDURE — 97530 THERAPEUTIC ACTIVITIES: CPT | Mod: CQ

## 2024-03-25 NOTE — PROGRESS NOTES
"[facility]   Physical Therapy Treatment Note      Name: Herman Chirinos  Clinic Number: [id number]    Therapy Diagnosis:   Encounter Diagnoses   Name Primary?    Spinal stenosis of lumbar region: see MRI 2006 Yes    Chronic bilateral low back pain without sciatica     Lumbar radiculopathy      Physician: Yoana Cleveland MD    Visit Date: 3/25/2024    Physician Orders: PT Eval and Treat   Medical Diagnosis from Referral: M54.50,G89.29 (ICD-10-CM) - Chronic bilateral low back pain without sciatica M54.16 (ICD-10-CM) - Lumbar radiculopathy  Evaluation Date: 3/7/2024  Authorization Period Expiration: 2/18/2025  Plan of Care Expiration: 5/3/2024  Progress Note Due: 4/4/2025  Date of Surgery: n/a  Visit # / Visits authorized: 1/1; 4/20   FOTO: 1/ 3     Precautions: Standard       PTA Visit #: 1/5     Time In: 1002  Time Out: 1055  Total Billable Time: 53 minutes    Subjective      Patient reports: some soreness after last treatment sessio, however, patient denied any increased or reproduced pain.     He was compliant with home exercise program.  Response to previous treatment: no adverse response to IE  Functional change: none yet reported     Pain: 2/10  Location: bilateral low back     Objective      Objective Measures updated at progress report unless specified.     Treatment     Herman received the treatments listed below:      therapeutic exercises to develop strength, ROM, flexibility, posture, and core stabilization for 30 minutes including:     - NuStep x 5 min   - seated thoracic extension 20x 5 sec hold  - seated sciatic nerve glide 20x bilatearlly  - seated hamstring stretch 5x30s  - lower trunk rotations x 20  - bridge with green band 3 x 10  - side lying clam with green thera band 2 x 10 reps  - sidelying open books 10x both sides  - seated CTJ mobilization 20x 5 sec hold     NT:  - piriformis stretch 30" x 3 bilaterally  - supine 90/90 hamstring active stretch x 15 reps bilaterally " "(hold)   - bend knee fallout with green thera band with transverse abdominal isometric 2 x 10 reps  - straight leg raises with transverse abdominal isometric on theraball 2 x 10 bilaterally  - seated sciatic nerve glide x 15 reps bilaterally  - seated lumbar flexion 2 x 10 forward, 2 x 5 to each side     therapeutic activities to improve functional performance for 15  minutes, including:     - sit to stand from 20" mat without upper extremity assist 2 x 10 reps  - resisted side stepping with GTB 5 laps along mat  - forward steps ups on bottom step x 10 each      Herman received the following manual therapy techniques: Joint mobilizations and Manual traction were applied to the: lumbar spine for 00 minutes, including:     Prone thoracic mobilization PA grade 3-4  Supine lumbar traction unilateral     Patient Education and Home Exercises       Education provided:   - HEP review  - benefits of nerve mobilization and reasoning  - avoidance of aggravating factors that place increased tension on nerve such as driving     Written Home Exercises Provided: yes. Exercises were reviewed and Herman was able to demonstrate them prior to the end of the session.  Herman demonstrated good  understanding of the education provided. See Electronic Medical Record under Patient Instructions for exercises provided during therapy sessions    Assessment     Patient continues to complete therapeutic exercise without reports of increased or reproduced pain throughout or after treatment session. Patient still with some fatigue throughout sit to stands and step ups onto bottom step, however, after seated rest breaks after tasks patient able to complete fill reps an circuit. Patient with decreased stiffness upon leaving Physical Therapy treatment today.     Herman Is progressing well towards his goals.   Pt prognosis is Good.     Pt will continue to benefit from skilled outpatient physical therapy to address the deficits listed in the problem " list box on initial evaluation, provide pt/family education and to maximize pt's level of independence in the home and community environment.     Pt's spiritual, cultural and educational needs considered and pt agreeable to plan of care and goals.     Anticipated barriers to physical therapy: none     Goals:   Short Term Goals: 4 weeks   1. Patient will demonstrate good transverse abdominal muscle contraction for improved deep abdominal strength and lumbar stability. (PROGRESSING, NOT MET)   2. Increase lumbar ROM to 100% of WNL in order to improve functional mobility.   (PROGRESSING, NOT MET)   3. Patient will demonstrate good sitting/standing posture and body mechanics for improved spine health and decreased risk of future injury. (PROGRESSING, NOT MET)   4. Patient will improve bilateral lower extremity MMT grades by >/=1/2 grade in order to improve strength for standing ADLs.  (PROGRESSING, NOT MET)   5. Patient will score >/= 10 repetitions on 30-Second Sit to  order to improve bilateral lower extremity muscular power for transfers.  (PROGRESSING, NOT MET)   6. Patient will be compliant with home exercise program to supplement therapy in promoting functional mobility. (PROGRESSING, NOT MET)      Long Term Goals: 8 weeks  1. Patient will improve FOTO score to </= 38% limited to decrease perceived limitation with maintaining/changing body position. (PROGRESSING, NOT MET)   2. Patient will be 100% compliant with updated HEP in order to maximize PT benefits post-discharge. (PROGRESSING, NOT MET)   3. Patient will improve bilateral lower extremity MMT grades by >/=1 grade in order to improve strength for standing activities of daily living. (PROGRESSING, NOT MET)   4. Patient will score >/= 12 repetitions on 30-Second Sit to  order to improve bilateral lower extremity muscular power for transfers.  (PROGRESSING, NOT MET)   5. Patient will report pain at worst over 2-week period as </=2/10 in order to  improve general activity tolerance. (PROGRESSING, NOT MET)   6. Pt will begin some form of home/community fitness in order to sustain progress gained in physical therapy. (PROGRESSING, NOT MET)     Plan     Continue with Physical Therapist Plan of Care.     PT/PTA met face to face to discuss pt's treatment plan and progress towards established goals. Pt will be seen by a physical therapist minimally every 6th visit or every 30 days.      Adi Barnhart PTA

## 2024-03-28 ENCOUNTER — CLINICAL SUPPORT (OUTPATIENT)
Dept: REHABILITATION | Facility: HOSPITAL | Age: 74
End: 2024-03-28
Payer: MEDICARE

## 2024-03-28 DIAGNOSIS — M54.50 CHRONIC BILATERAL LOW BACK PAIN WITHOUT SCIATICA: ICD-10-CM

## 2024-03-28 DIAGNOSIS — M48.061 SPINAL STENOSIS OF LUMBAR REGION WITHOUT NEUROGENIC CLAUDICATION: Primary | ICD-10-CM

## 2024-03-28 DIAGNOSIS — M54.16 LUMBAR RADICULOPATHY: ICD-10-CM

## 2024-03-28 DIAGNOSIS — G89.29 CHRONIC BILATERAL LOW BACK PAIN WITHOUT SCIATICA: ICD-10-CM

## 2024-03-28 PROCEDURE — 97110 THERAPEUTIC EXERCISES: CPT | Mod: CQ

## 2024-03-28 PROCEDURE — 97530 THERAPEUTIC ACTIVITIES: CPT | Mod: CQ

## 2024-03-28 NOTE — PROGRESS NOTES
"  [facility]   Physical Therapy Treatment Note      Name: Herman Chirnios  Clinic Number: [id number]    Therapy Diagnosis:   Encounter Diagnoses   Name Primary?    Spinal stenosis of lumbar region: see MRI 2006 Yes    Chronic bilateral low back pain without sciatica     Lumbar radiculopathy      Physician: Yoana Cleveland MD    Visit Date: 3/28/2024    Physician Orders: PT Eval and Treat   Medical Diagnosis from Referral: M54.50,G89.29 (ICD-10-CM) - Chronic bilateral low back pain without sciatica M54.16 (ICD-10-CM) - Lumbar radiculopathy  Evaluation Date: 3/7/2024  Authorization Period Expiration: 2/18/2025  Plan of Care Expiration: 5/3/2024  Progress Note Due: 4/4/2025  Date of Surgery: n/a  Visit # / Visits authorized: 1/1; 5/20   FOTO: 1/ 3     Precautions: Standard       PTA Visit #: 2/5     Time In: 1002 AM   Time Out: 1055 AM   Total Billable Time: 53 minutes    Subjective      Patient reports: feeling good upon entering Physical Therapy treatment today.      He was compliant with home exercise program.  Response to previous treatment: no adverse response to IE  Functional change: none yet reported     Pain: 0/10  Location: bilateral low back     Objective      Objective Measures updated at progress report unless specified.     Treatment     Herman received the treatments listed below:      therapeutic exercises to develop strength, ROM, flexibility, posture, and core stabilization for 30 minutes including:     - NuStep x 5 min   - seated thoracic extension 20x 5 sec hold  - seated sciatic nerve glide 20x bilatearlly  - seated hamstring stretch 5x30s  - lower trunk rotations x 20  - bridge with green band 3 x 10  - side lying clam with green thera band 2 x 10 reps  - sidelying open books 10x both sides  - seated CTJ mobilization 20x 5 sec hold     NT:  - piriformis stretch 30" x 3 bilaterally  - supine 90/90 hamstring active stretch x 15 reps bilaterally (hold)   - bend knee fallout " "with green thera band with transverse abdominal isometric 2 x 10 reps  - straight leg raises with transverse abdominal isometric on theraball 2 x 10 bilaterally  - seated sciatic nerve glide x 15 reps bilaterally  - seated lumbar flexion 2 x 10 forward, 2 x 5 to each side     therapeutic activities to improve functional performance for 15  minutes, including:     - sit to stand from 20" mat without upper extremity assist 2 x 10 reps  - resisted side stepping with GTB 5 laps along mat  - forward steps ups on bottom step 2 x 10 each      Herman received the following manual therapy techniques: Joint mobilizations and Manual traction were applied to the: lumbar spine for 00 minutes, including:     Prone thoracic mobilization PA grade 3-4  Supine lumbar traction unilateral     Patient Education and Home Exercises       Education provided:   - HEP review  - benefits of nerve mobilization and reasoning  - avoidance of aggravating factors that place increased tension on nerve such as driving     Written Home Exercises Provided: yes. Exercises were reviewed and Herman was able to demonstrate them prior to the end of the session. Herman demonstrated good  understanding of the education provided. See Electronic Medical Record under Patient Instructions for exercises provided during therapy sessions    Assessment     Patient continues to complete therapeutic exercise without reports of increased or reproduced pain throughout or after Physical Therapy treatment. Patient was able to complete increased step up reps without reproduced pain, however, patient with increased fatigue throughout task. Patient also with some fatigue throughout sit to stands. Will continue to monitor and progress patient as tolerated.     Herman Is progressing well towards his goals.   Pt prognosis is Good.     Pt will continue to benefit from skilled outpatient physical therapy to address the deficits listed in the problem list box on initial " evaluation, provide pt/family education and to maximize pt's level of independence in the home and community environment.     Pt's spiritual, cultural and educational needs considered and pt agreeable to plan of care and goals.     Anticipated barriers to physical therapy: none     Goals:   Short Term Goals: 4 weeks   1. Patient will demonstrate good transverse abdominal muscle contraction for improved deep abdominal strength and lumbar stability. (PROGRESSING, NOT MET)   2. Increase lumbar ROM to 100% of WNL in order to improve functional mobility.   (PROGRESSING, NOT MET)   3. Patient will demonstrate good sitting/standing posture and body mechanics for improved spine health and decreased risk of future injury. (PROGRESSING, NOT MET)   4. Patient will improve bilateral lower extremity MMT grades by >/=1/2 grade in order to improve strength for standing ADLs.  (PROGRESSING, NOT MET)   5. Patient will score >/= 10 repetitions on 30-Second Sit to  order to improve bilateral lower extremity muscular power for transfers.  (PROGRESSING, NOT MET)   6. Patient will be compliant with home exercise program to supplement therapy in promoting functional mobility. (PROGRESSING, NOT MET)      Long Term Goals: 8 weeks  1. Patient will improve FOTO score to </= 38% limited to decrease perceived limitation with maintaining/changing body position. (PROGRESSING, NOT MET)   2. Patient will be 100% compliant with updated HEP in order to maximize PT benefits post-discharge. (PROGRESSING, NOT MET)   3. Patient will improve bilateral lower extremity MMT grades by >/=1 grade in order to improve strength for standing activities of daily living. (PROGRESSING, NOT MET)   4. Patient will score >/= 12 repetitions on 30-Second Sit to  order to improve bilateral lower extremity muscular power for transfers.  (PROGRESSING, NOT MET)   5. Patient will report pain at worst over 2-week period as </=2/10 in order to improve general  activity tolerance. (PROGRESSING, NOT MET)   6. Pt will begin some form of home/community fitness in order to sustain progress gained in physical therapy. (PROGRESSING, NOT MET)     Plan     Continue with Physical Therapist Plan of Care.     PT/PTA met face to face to discuss pt's treatment plan and progress towards established goals. Pt will be seen by a physical therapist minimally every 6th visit or every 30 days.      Adi Barnhart PTA

## 2024-04-01 ENCOUNTER — CLINICAL SUPPORT (OUTPATIENT)
Dept: REHABILITATION | Facility: HOSPITAL | Age: 74
End: 2024-04-01
Payer: MEDICARE

## 2024-04-01 ENCOUNTER — DOCUMENTATION ONLY (OUTPATIENT)
Dept: REHABILITATION | Facility: HOSPITAL | Age: 74
End: 2024-04-01
Payer: MEDICARE

## 2024-04-01 DIAGNOSIS — M54.16 LUMBAR RADICULOPATHY: ICD-10-CM

## 2024-04-01 DIAGNOSIS — M48.061 SPINAL STENOSIS OF LUMBAR REGION WITHOUT NEUROGENIC CLAUDICATION: Primary | ICD-10-CM

## 2024-04-01 DIAGNOSIS — M54.50 CHRONIC BILATERAL LOW BACK PAIN WITHOUT SCIATICA: ICD-10-CM

## 2024-04-01 DIAGNOSIS — G89.29 CHRONIC BILATERAL LOW BACK PAIN WITHOUT SCIATICA: ICD-10-CM

## 2024-04-01 PROCEDURE — 97530 THERAPEUTIC ACTIVITIES: CPT | Mod: CQ

## 2024-04-01 PROCEDURE — 97110 THERAPEUTIC EXERCISES: CPT | Mod: CQ

## 2024-04-01 NOTE — PROGRESS NOTES
[facility]   Physical Therapy Treatment Note      Name: Herman Chirinos  Clinic Number: [id number]    Therapy Diagnosis:   Encounter Diagnoses   Name Primary?    Spinal stenosis of lumbar region: see MRI 2006 Yes    Chronic bilateral low back pain without sciatica     Lumbar radiculopathy      Physician: Yoana Cleveland MD    Visit Date: 4/1/2024    Physician Orders: PT Eval and Treat   Medical Diagnosis from Referral: M54.50,G89.29 (ICD-10-CM) - Chronic bilateral low back pain without sciatica M54.16 (ICD-10-CM) - Lumbar radiculopathy  Evaluation Date: 3/7/2024  Authorization Period Expiration: 2/18/2025  Plan of Care Expiration: 5/3/2024  Progress Note Due: 4/4/2025  Date of Surgery: n/a  Visit # / Visits authorized: 1/1; 7/20   FOTO: 1/ 3     Precautions: Standard       PTA Visit #: 3/5     Time In:  1004 AM   Time Out: 1057 AM   Total Billable Time: 53 minutes    Subjective      Patient reports: no discomfort in lumbar spine, however, patient with discomfort in left knee upon entering Physical Therapy treatment. Patient thinks discomfort in knee is due to arthritis.      He was compliant with home exercise program.  Response to previous treatment: no adverse response to IE  Functional change: none yet reported     Pain: 0/10  Location: bilateral low back     Objective      Objective Measures updated at progress report unless specified.     Treatment     Herman received the treatments listed below:      therapeutic exercises to develop strength, ROM, flexibility, posture, and core stabilization for 38 minutes including:     - NuStep x 5 min   - seated thoracic extension 20x 5 sec hold  - seated sciatic nerve glide 20x bilatearlly  - seated hamstring stretch 5x30s  - lower trunk rotations x 20  - bridge with green band 3 x 10  - side lying clam with green thera band 2 x 10 reps  - sidelying open books 10x both sides  - seated CTJ mobilization 20x 5 sec hold     NT:  - piriformis stretch  "30" x 3 bilaterally  - supine 90/90 hamstring active stretch x 15 reps bilaterally (hold)   - bend knee fallout with green thera band with transverse abdominal isometric 2 x 10 reps  - straight leg raises with transverse abdominal isometric on theraball 2 x 10 bilaterally  - seated sciatic nerve glide x 15 reps bilaterally  - seated lumbar flexion 2 x 10 forward, 2 x 5 to each side     therapeutic activities to improve functional performance for 15  minutes, including:     - sit to stand from 20" mat without upper extremity assist 2 x 10 reps  - resisted side stepping with GTB 5 laps along mat  - forward steps ups on bottom step 2 x 10 each      Herman received the following manual therapy techniques: Joint mobilizations and Manual traction were applied to the: lumbar spine for 00 minutes, including:     Prone thoracic mobilization PA grade 3-4  Supine lumbar traction unilateral     Patient Education and Home Exercises       Education provided:   - HEP review  - benefits of nerve mobilization and reasoning  - avoidance of aggravating factors that place increased tension on nerve such as driving     Written Home Exercises Provided: yes. Exercises were reviewed and Herman was able to demonstrate them prior to the end of the session. Herman demonstrated good  understanding of the education provided. See Electronic Medical Record under Patient Instructions for exercises provided during therapy sessions    Assessment     Patient continues to complete therapeutic exercise without reports of increased pain, however, patient still with muscle and physical fatigue throughout treatment. Patient with most fatigue with sit to stands and step ups on bottom steps. Patient still with some thoracic tightness throughout open books, however, patient without reproduced pain. Patient received HEP today to help increase hip strengthening and lumbar spine mobility outside of Physical Therapy treatment.      Herman Is progressing well " towards his goals.   Pt prognosis is Good.     Pt will continue to benefit from skilled outpatient physical therapy to address the deficits listed in the problem list box on initial evaluation, provide pt/family education and to maximize pt's level of independence in the home and community environment.     Pt's spiritual, cultural and educational needs considered and pt agreeable to plan of care and goals.     Anticipated barriers to physical therapy: none     Goals:   Short Term Goals: 4 weeks   1. Patient will demonstrate good transverse abdominal muscle contraction for improved deep abdominal strength and lumbar stability. (PROGRESSING, NOT MET)   2. Increase lumbar ROM to 100% of WNL in order to improve functional mobility.   (PROGRESSING, NOT MET)   3. Patient will demonstrate good sitting/standing posture and body mechanics for improved spine health and decreased risk of future injury. (PROGRESSING, NOT MET)   4. Patient will improve bilateral lower extremity MMT grades by >/=1/2 grade in order to improve strength for standing ADLs.  (PROGRESSING, NOT MET)   5. Patient will score >/= 10 repetitions on 30-Second Sit to  order to improve bilateral lower extremity muscular power for transfers.  (PROGRESSING, NOT MET)   6. Patient will be compliant with home exercise program to supplement therapy in promoting functional mobility. (PROGRESSING, NOT MET)      Long Term Goals: 8 weeks  1. Patient will improve FOTO score to </= 38% limited to decrease perceived limitation with maintaining/changing body position. (PROGRESSING, NOT MET)   2. Patient will be 100% compliant with updated HEP in order to maximize PT benefits post-discharge. (PROGRESSING, NOT MET)   3. Patient will improve bilateral lower extremity MMT grades by >/=1 grade in order to improve strength for standing activities of daily living. (PROGRESSING, NOT MET)   4. Patient will score >/= 12 repetitions on 30-Second Sit to  order to  improve bilateral lower extremity muscular power for transfers.  (PROGRESSING, NOT MET)   5. Patient will report pain at worst over 2-week period as </=2/10 in order to improve general activity tolerance. (PROGRESSING, NOT MET)   6. Pt will begin some form of home/community fitness in order to sustain progress gained in physical therapy. (PROGRESSING, NOT MET)     Plan     Continue with Physical Therapist Plan of Care.     PT/PTA met face to face to discuss pt's treatment plan and progress towards established goals. Pt will be seen by a physical therapist minimally every 6th visit or every 30 days.      Adi Barnhart PTA

## 2024-04-01 NOTE — PROGRESS NOTES
PT/PTA met face to face to discuss pt's treatment plan and progress towards established goals. Pt will be seen by a physical therapist minimally every 6th visit or every 30 days.    Adi Barnhart PTA

## 2024-04-04 ENCOUNTER — CLINICAL SUPPORT (OUTPATIENT)
Dept: REHABILITATION | Facility: HOSPITAL | Age: 74
End: 2024-04-04
Payer: MEDICARE

## 2024-04-04 DIAGNOSIS — Z74.09 IMPAIRED MOBILITY AND ADLS: ICD-10-CM

## 2024-04-04 DIAGNOSIS — Z78.9 IMPAIRED MOBILITY AND ADLS: ICD-10-CM

## 2024-04-04 DIAGNOSIS — G89.29 CHRONIC BILATERAL LOW BACK PAIN WITHOUT SCIATICA: Primary | ICD-10-CM

## 2024-04-04 DIAGNOSIS — M54.50 CHRONIC BILATERAL LOW BACK PAIN WITHOUT SCIATICA: Primary | ICD-10-CM

## 2024-04-04 PROCEDURE — 97110 THERAPEUTIC EXERCISES: CPT

## 2024-04-04 PROCEDURE — 97530 THERAPEUTIC ACTIVITIES: CPT

## 2024-04-04 NOTE — PROGRESS NOTES
"  [facility]   Physical Therapy Treatment Note      Name: Herman Chirinos  Clinic Number: [id number]    Therapy Diagnosis:   Encounter Diagnosis   Name Primary?    Chronic bilateral low back pain without sciatica Yes       Physician: Yoana Cleveland MD    Visit Date: 4/4/2024    Physician Orders: PT Eval and Treat   Medical Diagnosis from Referral: M54.50,G89.29 (ICD-10-CM) - Chronic bilateral low back pain without sciatica M54.16 (ICD-10-CM) - Lumbar radiculopathy  Evaluation Date: 3/7/2024  Authorization Period Expiration: 2/18/2025  Plan of Care Expiration: 5/3/2024  Progress Note Due: 4/4/2025  Date of Surgery: n/a  Visit # / Visits authorized: 1/1; 7/20   FOTO: 1/ 3     Precautions: Standard       PTA Visit #: 3/5     Time In:  1000 AM   Time Out: 1055 AM   Total Billable Time: 55 minutes    Subjective      Patient reports: stiffness in back, still has some tingling in the back of the legs when bending over      He was compliant with home exercise program.  Response to previous treatment: no adverse response to IE  Functional change: none yet reported     Pain: 0/10  Location: bilateral low back     Objective      Objective Measures updated at progress report unless specified.     Treatment     Herman received the treatments listed below:      therapeutic exercises to develop strength, ROM, flexibility, posture, and core stabilization for 40 minutes including:     - NuStep x 5 min   - seated thoracic extension 20x 5 sec hold  - seated sciatic nerve glide 20x bilatearlly  - seated hamstring stretch 5x30s  - lower trunk rotations x 20  - bridge with green band 3 x 10  - side lying clam with green thera band 2 x 10 reps  - sidelying open books 10x both sides  - seated CTJ mobilization 20x 5 sec hold        therapeutic activities to improve functional performance for 15  minutes, including:     - sit to stand from 20" mat without upper extremity assist 2 x 10 reps  - resisted side stepping " with GTB 5 laps along mat  - forward steps ups + contralateral hip flexion on bottom step 2 x 10 each   - lateral step ups 2 x 10 reps  - hip hinging with dowel x 10 reps for reinforcement of proper body mechanics.     Herman received the following manual therapy techniques: Joint mobilizations and Manual traction were applied to the: lumbar spine for 00 minutes, including:     Prone thoracic mobilization PA grade 3-4  Supine lumbar traction unilateral     Patient Education and Home Exercises       Education provided:   - HEP review  - benefits of nerve mobilization and reasoning  - avoidance of aggravating factors that place increased tension on nerve such as driving     Written Home Exercises Provided: yes. Exercises were reviewed and Herman was able to demonstrate them prior to the end of the session. Herman demonstrated good  understanding of the education provided. See Electronic Medical Record under Patient Instructions for exercises provided during therapy sessions    Assessment     Patient tolerated session well.  Mr Sutton was able to tolerated increased activity without rest breaks today.  No radicular pain exacerbated with any activities performed this visit.  Pt verbalized more ease of motion by the end of session today. Pt encouraged to continue with sciatic nerve glides daily to improve neural tension.    Herman Is progressing well towards his goals.   Pt prognosis is Good.     Pt will continue to benefit from skilled outpatient physical therapy to address the deficits listed in the problem list box on initial evaluation, provide pt/family education and to maximize pt's level of independence in the home and community environment.     Pt's spiritual, cultural and educational needs considered and pt agreeable to plan of care and goals.     Anticipated barriers to physical therapy: none     Goals:   Short Term Goals: 4 weeks   1. Patient will demonstrate good transverse abdominal muscle contraction for  improved deep abdominal strength and lumbar stability. (PROGRESSING, NOT MET)   2. Increase lumbar ROM to 100% of WNL in order to improve functional mobility.   (PROGRESSING, NOT MET)   3. Patient will demonstrate good sitting/standing posture and body mechanics for improved spine health and decreased risk of future injury. (PROGRESSING, NOT MET)   4. Patient will improve bilateral lower extremity MMT grades by >/=1/2 grade in order to improve strength for standing ADLs.  (PROGRESSING, NOT MET)   5. Patient will score >/= 10 repetitions on 30-Second Sit to  order to improve bilateral lower extremity muscular power for transfers.  (PROGRESSING, NOT MET)   6. Patient will be compliant with home exercise program to supplement therapy in promoting functional mobility. (PROGRESSING, NOT MET)      Long Term Goals: 8 weeks  1. Patient will improve FOTO score to </= 38% limited to decrease perceived limitation with maintaining/changing body position. (PROGRESSING, NOT MET)   2. Patient will be 100% compliant with updated HEP in order to maximize PT benefits post-discharge. (PROGRESSING, NOT MET)   3. Patient will improve bilateral lower extremity MMT grades by >/=1 grade in order to improve strength for standing activities of daily living. (PROGRESSING, NOT MET)   4. Patient will score >/= 12 repetitions on 30-Second Sit to  order to improve bilateral lower extremity muscular power for transfers.  (PROGRESSING, NOT MET)   5. Patient will report pain at worst over 2-week period as </=2/10 in order to improve general activity tolerance. (PROGRESSING, NOT MET)   6. Pt will begin some form of home/community fitness in order to sustain progress gained in physical therapy. (PROGRESSING, NOT MET)     Plan     Continue with current plan of care.        Shobha Mendes, PT

## 2024-04-08 ENCOUNTER — CLINICAL SUPPORT (OUTPATIENT)
Dept: REHABILITATION | Facility: HOSPITAL | Age: 74
End: 2024-04-08
Payer: MEDICARE

## 2024-04-08 DIAGNOSIS — Z78.9 IMPAIRED MOBILITY AND ADLS: Primary | ICD-10-CM

## 2024-04-08 DIAGNOSIS — Z74.09 IMPAIRED MOBILITY AND ADLS: Primary | ICD-10-CM

## 2024-04-08 PROCEDURE — 97110 THERAPEUTIC EXERCISES: CPT

## 2024-04-08 PROCEDURE — 97530 THERAPEUTIC ACTIVITIES: CPT

## 2024-04-08 NOTE — PROGRESS NOTES
"  [facility]   Physical Therapy Treatment Note      Name: Herman Chirinos  Clinic Number: [id number]    Therapy Diagnosis:   Encounter Diagnosis   Name Primary?    Impaired mobility and ADLs Yes       Physician: Yoana Cleveland MD    Visit Date: 4/8/2024    Physician Orders: PT Eval and Treat   Medical Diagnosis from Referral: M54.50,G89.29 (ICD-10-CM) - Chronic bilateral low back pain without sciatica M54.16 (ICD-10-CM) - Lumbar radiculopathy  Evaluation Date: 3/7/2024  Authorization Period Expiration: 2/18/2025  Plan of Care Expiration: 5/3/2024  Progress Note Due: 4/4/2025  Date of Surgery: n/a  Visit # / Visits authorized: 1/1; 9/20   FOTO: 1/ 3     Precautions: Standard       PTA Visit #: 3/5     Time In:  1000 AM   Time Out: 1055 AM   Total Billable Time: 55 minutes    Subjective      Patient reports: stiffness in back. Still will experience symptoms in posterior legs when bending over especially for prolonged periods.     He was compliant with home exercise program.  Response to previous treatment: no adverse response to IE  Functional change: none yet reported     Pain: 0/10  Location: bilateral low back     Objective      Objective Measures updated at progress report unless specified.     Treatment     Herman received the treatments listed below:      therapeutic exercises to develop strength, ROM, flexibility, posture, and core stabilization for 40 minutes including:     - NuStep x 5 min   - seated thoracic extension 20x 5 sec hold  - seated sciatic nerve glide 20x bilatearlly  - seated hamstring stretch 5x30s  - lower trunk rotations x 30  - bridge with green band 3 x 10  - side lying clam with green thera band 2 x 10 reps  - sidelying open books 10x both sides  - seated CTJ mobilization 20x 5 sec hold     therapeutic activities to improve functional performance for 15  minutes, including:     - sit to stand from 20" mat without upper extremity assist 3 x 10 reps  - resisted side " stepping with GTB 5 laps along mat  - forward steps ups + contralateral hip flexion on bottom step 3x10 each   - lateral step ups 3x10 reps both sides  - hip hinging with dowel 3x10 reps for reinforcement of proper body mechanics.     Herman received the following manual therapy techniques: Joint mobilizations and Manual traction were applied to the: lumbar spine for 00 minutes, including:     Prone thoracic mobilization PA grade 3-4  Supine lumbar traction unilateral     Patient Education and Home Exercises       Education provided:   - HEP review  - benefits of nerve mobilization and reasoning  - avoidance of aggravating factors that place increased tension on nerve such as driving     Written Home Exercises Provided: yes. Exercises were reviewed and Herman was able to demonstrate them prior to the end of the session. Herman demonstrated good  understanding of the education provided. See Electronic Medical Record under Patient Instructions for exercises provided during therapy sessions    Assessment     Patient tolerated session well.  Mr Sutton was able to tolerated increased volume of activities today without symptoms.  No radicular pain exacerbated with any activities performed this visit, but reports still present with prolonged lumbar flexion activities.  Pt with improved mobility following session and subjective reports to match objective observations. Plan to continue to progress as tolerated.    Herman Is progressing well towards his goals.   Pt prognosis is Good.     Pt will continue to benefit from skilled outpatient physical therapy to address the deficits listed in the problem list box on initial evaluation, provide pt/family education and to maximize pt's level of independence in the home and community environment.     Pt's spiritual, cultural and educational needs considered and pt agreeable to plan of care and goals.     Anticipated barriers to physical therapy: none     Goals:   Short Term Goals: 4  weeks   1. Patient will demonstrate good transverse abdominal muscle contraction for improved deep abdominal strength and lumbar stability. (PROGRESSING, NOT MET)   2. Increase lumbar ROM to 100% of WNL in order to improve functional mobility.   (PROGRESSING, NOT MET)   3. Patient will demonstrate good sitting/standing posture and body mechanics for improved spine health and decreased risk of future injury. (PROGRESSING, NOT MET)   4. Patient will improve bilateral lower extremity MMT grades by >/=1/2 grade in order to improve strength for standing ADLs.  (PROGRESSING, NOT MET)   5. Patient will score >/= 10 repetitions on 30-Second Sit to  order to improve bilateral lower extremity muscular power for transfers.  (PROGRESSING, NOT MET)   6. Patient will be compliant with home exercise program to supplement therapy in promoting functional mobility. (PROGRESSING, NOT MET)      Long Term Goals: 8 weeks  1. Patient will improve FOTO score to </= 38% limited to decrease perceived limitation with maintaining/changing body position. (PROGRESSING, NOT MET)   2. Patient will be 100% compliant with updated HEP in order to maximize PT benefits post-discharge. (PROGRESSING, NOT MET)   3. Patient will improve bilateral lower extremity MMT grades by >/=1 grade in order to improve strength for standing activities of daily living. (PROGRESSING, NOT MET)   4. Patient will score >/= 12 repetitions on 30-Second Sit to  order to improve bilateral lower extremity muscular power for transfers.  (PROGRESSING, NOT MET)   5. Patient will report pain at worst over 2-week period as </=2/10 in order to improve general activity tolerance. (PROGRESSING, NOT MET)   6. Pt will begin some form of home/community fitness in order to sustain progress gained in physical therapy. (PROGRESSING, NOT MET)     Plan     Continue with current plan of care.    Roberto Ash, PT

## 2024-04-11 ENCOUNTER — CLINICAL SUPPORT (OUTPATIENT)
Dept: REHABILITATION | Facility: HOSPITAL | Age: 74
End: 2024-04-11
Payer: MEDICARE

## 2024-04-11 DIAGNOSIS — Z74.09 IMPAIRED MOBILITY AND ADLS: Primary | ICD-10-CM

## 2024-04-11 DIAGNOSIS — Z78.9 IMPAIRED MOBILITY AND ADLS: Primary | ICD-10-CM

## 2024-04-11 PROCEDURE — 97530 THERAPEUTIC ACTIVITIES: CPT

## 2024-04-11 PROCEDURE — 97110 THERAPEUTIC EXERCISES: CPT

## 2024-04-11 NOTE — PROGRESS NOTES
"[facility]   Physical Therapy Treatment Note      Name: Herman Chirinos  Clinic Number: [id number]    Therapy Diagnosis:   Encounter Diagnosis   Name Primary?    Impaired mobility and ADLs Yes     Physician: Yoana Cleveland MD    Visit Date: 4/15/2024      Physician Orders: PT Eval and Treat   Medical Diagnosis from Referral: M54.50,G89.29 (ICD-10-CM) - Chronic bilateral low back pain without sciatica M54.16 (ICD-10-CM) - Lumbar radiculopathy  Evaluation Date: 3/7/2024  Authorization Period Expiration: 2/18/2025  Plan of Care Expiration: 5/3/2024  Progress Note Due: 5/3/2025  Date of Surgery: n/a  Visit # / Visits authorized: 1/1; 11/20   FOTO: 1/ 3     Precautions: Standard        PTA Visit #: 1/5     Time In: 1000 AM   Time Out: 1053 AM   Total Billable Time: 53 minutes    Subjective     Patient reports: some discomfort throughout left side of lumbar spine upon entering Physical Therapy treatment today.      He was compliant with home exercise program.  Response to previous treatment: no adverse response to IE  Functional change: none yet reported     Pain: 2/10  Location: bilateral low back     Objective      Objective Measures updated at progress report unless specified.     Treatment     Herman received the treatments listed below:      therapeutic exercises to develop strength, ROM, flexibility, posture, and core stabilization for 40 minutes including:     - NuStep x 5 min   - seated thoracic extension 20x 5 sec hold  - seated sciatic nerve glide 20x bilatearlly  - seated hamstring stretch 5x20s  - lower trunk rotations x 30  - bridge with green band 3 x 10 (increased radicular sx today)  - side lying clam with green thera band 2 x 15 reps  - sidelying open books 10x both sides  - seated CTJ mobilization 20x 5 sec hold     therapeutic activities to improve functional performance for 20 minutes, including:     - sit to stand from 20" mat without upper extremity assist 3 x 10 reps  - " resisted side stepping with GTB 5 laps along mat  - forward steps ups + contralateral hip flexion on bottom step 3x10 each  - lateral step ups 3x10 reps both sides   - hip hinging with dowel 3x10 reps for reinforcement of proper body mechanics     Herman received the following manual therapy techniques: Joint mobilizations and Manual traction were applied to the: lumbar spine for 00 minutes, including:     Prone thoracic mobilization PA grade 3-4  Supine lumbar traction unilateral     Patient Education and Home Exercises       Education provided:   - HEP review  - benefits of nerve mobilization and reasoning  - avoidance of aggravating factors that place increased tension on nerve such as driving     Written Home Exercises Provided: yes. Exercises were reviewed and Herman was able to demonstrate them prior to the end of the session. Herman demonstrated good  understanding of the education provided. See Electronic Medical Record under Patient Instructions for exercises provided during therapy sessions    Assessment     Patient was able to complete therapeutic exercise without reproduced pain, however, patient with increased fatigue throughout treatment. Patient with improved hip hinge with PVC pipe. Patient with increased fatigue throughout sit to stands, however, with rest breaks patient able to complete task properly and complete full reps. Physical Therapist Assistant discussed with patient him performing standing thoracic rotation at [place] and CTJ mob with towel at home to help increase range of motion throughout cervical and thoracic region. Will continue to monitor and progress patient as tolerated.     Herman Is progressing well towards his goals.   Pt prognosis is Good.     Pt will continue to benefit from skilled outpatient physical therapy to address the deficits listed in the problem list box on initial evaluation, provide pt/family education and to maximize pt's level of independence in the home and  community environment.     Pt's spiritual, cultural and educational needs considered and pt agreeable to plan of care and goals.     Anticipated barriers to physical therapy: none     Goals:     Short Term Goals: 4 weeks   1. Patient will demonstrate good transverse abdominal muscle contraction for improved deep abdominal strength and lumbar stability. (PROGRESSING, NOT MET)   2. Increase lumbar ROM to 100% of WNL in order to improve functional mobility.   (PROGRESSING, NOT MET)   3. Patient will demonstrate good sitting/standing posture and body mechanics for improved spine health and decreased risk of future injury. (PROGRESSING, NOT MET)   4. Patient will improve bilateral lower extremity MMT grades by >/=1/2 grade in order to improve strength for standing ADLs.  (PROGRESSING, NOT MET)   5. Patient will score >/= 10 repetitions on 30-Second Sit to  order to improve bilateral lower extremity muscular power for transfers.  (PROGRESSING, NOT MET)   6. Patient will be compliant with home exercise program to supplement therapy in promoting functional mobility. (PROGRESSING, NOT MET)      Long Term Goals: 8 weeks  1. Patient will improve FOTO score to </= 38% limited to decrease perceived limitation with maintaining/changing body position. (PROGRESSING, NOT MET)   2. Patient will be 100% compliant with updated HEP in order to maximize PT benefits post-discharge. (PROGRESSING, NOT MET)   3. Patient will improve bilateral lower extremity MMT grades by >/=1 grade in order to improve strength for standing activities of daily living. (PROGRESSING, NOT MET)   4. Patient will score >/= 12 repetitions on 30-Second Sit to  order to improve bilateral lower extremity muscular power for transfers.  (PROGRESSING, NOT MET)   5. Patient will report pain at worst over 2-week period as </=2/10 in order to improve general activity tolerance. (PROGRESSING, NOT MET)   6. Pt will begin some form of home/community fitness in  order to sustain progress gained in physical therapy. (PROGRESSING, NOT MET)     Plan     Continue with Physical Therapist Plan of Care.     PT/PTA met face to face to discuss pt's treatment plan and progress towards established goals. Pt will be seen by a physical therapist minimally every 6th visit or every 30 days.    Adi Barnhart PTA

## 2024-04-11 NOTE — PROGRESS NOTES
[facility]   Physical Therapy Treatment Note      Name: Herman Chirinos  Clinic Number: [id number]    Therapy Diagnosis:   Encounter Diagnosis   Name Primary?    Impaired mobility and ADLs Yes       Physician: Yoana Cleveland MD    Visit Date: 4/11/2024    Physician Orders: PT Eval and Treat   Medical Diagnosis from Referral: M54.50,G89.29 (ICD-10-CM) - Chronic bilateral low back pain without sciatica M54.16 (ICD-10-CM) - Lumbar radiculopathy  Evaluation Date: 3/7/2024  Authorization Period Expiration: 2/18/2025  Plan of Care Expiration: 5/3/2024  Progress Note Due: 5/3/2024  Date of Surgery: n/a  Visit # / Visits authorized: 1/1; 10/20   FOTO: 1/ 3     Precautions: Standard       PTA Visit #: 0/5     Time In:  1000 AM   Time Out: 1055 AM   Total Billable Time: 53 minutes    Subjective      Patient reports: stiffness in back. Still will experience symptoms in posterior legs when bending over especially for prolonged periods.     He was compliant with home exercise program.  Response to previous treatment: no adverse response to IE  Functional change: none yet reported     Pain: 0/10  Location: bilateral low back     Objective      Objective Measures updated at progress report unless specified.     Lumbar range of motion: minimal loss in all planes  + bilateral sciatic nerve tension (resolves with nerve glides   FOTO: 51% functional limitation (3% decline since IE)    Treatment     Herman received the treatments listed below:      therapeutic exercises to develop strength, ROM, flexibility, posture, and core stabilization for 45 minutes including:     - reassessment     - NuStep x 5 min   - seated thoracic extension 20x 5 sec hold  - seated sciatic nerve glide 20x bilatearlly  - seated hamstring stretch 5x20s  - lower trunk rotations x 30  - bridge with green band 3 x 10 (increased radicular sx today)  - side lying clam with green thera band 2 x 15 reps  - sidelying open books 10x both  "sides  - seated CTJ mobilization 20x 5 sec hold     therapeutic activities to improve functional performance for 8  minutes, including:     - sit to stand from 20" mat without upper extremity assist 3 x 10 reps  - resisted side stepping with GTB 5 laps along mat  - forward steps ups + contralateral hip flexion on bottom step 3x10 each     Not today:  - lateral step ups 3x10 reps both sides   - hip hinging with dowel 3x10 reps for reinforcement of proper body mechanics.     Herman received the following manual therapy techniques: Joint mobilizations and Manual traction were applied to the: lumbar spine for 00 minutes, including:     Prone thoracic mobilization PA grade 3-4  Supine lumbar traction unilateral     Patient Education and Home Exercises       Education provided:   - HEP review  - benefits of nerve mobilization and reasoning  - avoidance of aggravating factors that place increased tension on nerve such as driving     Written Home Exercises Provided: yes. Exercises were reviewed and Herman was able to demonstrate them prior to the end of the session. Herman demonstrated good  understanding of the education provided. See Electronic Medical Record under Patient Instructions for exercises provided during therapy sessions    Assessment     Mr Sutton arrives with mild radicular symptoms in bilateral upper legs, when asked what he did differently today, he reports cutting grass this morning prior to coming to therapy.  Seated sciatic nerve glides performed at start of session resolved symptoms.  Pt was able to tolerate seated hamstring stretching without onset of radicular symptoms but did have mild onset with bridging today.   N  Pt with improved mobility following session and subjective reports to match objective observations. Plan to continue to progress as tolerated.    Herman Is progressing well towards his goals.   Pt prognosis is Good.     Pt will continue to benefit from skilled outpatient physical therapy " to address the deficits listed in the problem list box on initial evaluation, provide pt/family education and to maximize pt's level of independence in the home and community environment.     Pt's spiritual, cultural and educational needs considered and pt agreeable to plan of care and goals.     Anticipated barriers to physical therapy: none     Goals:   Short Term Goals: 4 weeks   1. Patient will demonstrate good transverse abdominal muscle contraction for improved deep abdominal strength and lumbar stability. (PROGRESSING, NOT MET)   2. Increase lumbar ROM to 100% of WNL in order to improve functional mobility.   (PROGRESSING, NOT MET)   3. Patient will demonstrate good sitting/standing posture and body mechanics for improved spine health and decreased risk of future injury. (PROGRESSING, NOT MET)   4. Patient will improve bilateral lower extremity MMT grades by >/=1/2 grade in order to improve strength for standing ADLs.  (PROGRESSING, NOT MET)   5. Patient will score >/= 10 repetitions on 30-Second Sit to  order to improve bilateral lower extremity muscular power for transfers.  (PROGRESSING, NOT MET)   6. Patient will be compliant with home exercise program to supplement therapy in promoting functional mobility. (PROGRESSING, NOT MET)      Long Term Goals: 8 weeks  1. Patient will improve FOTO score to </= 38% limited to decrease perceived limitation with maintaining/changing body position. (PROGRESSING, NOT MET)   2. Patient will be 100% compliant with updated HEP in order to maximize PT benefits post-discharge. (PROGRESSING, NOT MET)   3. Patient will improve bilateral lower extremity MMT grades by >/=1 grade in order to improve strength for standing activities of daily living. (PROGRESSING, NOT MET)   4. Patient will score >/= 12 repetitions on 30-Second Sit to  order to improve bilateral lower extremity muscular power for transfers.  (PROGRESSING, NOT MET)   5. Patient will report pain at  worst over 2-week period as </=2/10 in order to improve general activity tolerance. (PROGRESSING, NOT MET)   6. Pt will begin some form of home/community fitness in order to sustain progress gained in physical therapy. (PROGRESSING, NOT MET)     Plan     Continue with current plan of care.    Shobha Mendes, PT

## 2024-04-15 ENCOUNTER — CLINICAL SUPPORT (OUTPATIENT)
Dept: REHABILITATION | Facility: HOSPITAL | Age: 74
End: 2024-04-15
Payer: MEDICARE

## 2024-04-15 DIAGNOSIS — Z78.9 IMPAIRED MOBILITY AND ADLS: Primary | ICD-10-CM

## 2024-04-15 DIAGNOSIS — Z74.09 IMPAIRED MOBILITY AND ADLS: Primary | ICD-10-CM

## 2024-04-15 PROCEDURE — 97110 THERAPEUTIC EXERCISES: CPT | Mod: CQ

## 2024-04-15 PROCEDURE — 97530 THERAPEUTIC ACTIVITIES: CPT | Mod: CQ

## 2024-04-18 ENCOUNTER — CLINICAL SUPPORT (OUTPATIENT)
Dept: REHABILITATION | Facility: HOSPITAL | Age: 74
End: 2024-04-18
Payer: MEDICARE

## 2024-04-18 DIAGNOSIS — Z78.9 IMPAIRED MOBILITY AND ADLS: Primary | ICD-10-CM

## 2024-04-18 DIAGNOSIS — Z74.09 IMPAIRED MOBILITY AND ADLS: Primary | ICD-10-CM

## 2024-04-18 PROCEDURE — 97110 THERAPEUTIC EXERCISES: CPT

## 2024-04-18 PROCEDURE — 97530 THERAPEUTIC ACTIVITIES: CPT

## 2024-04-18 NOTE — PROGRESS NOTES
"[facility]   Physical Therapy Treatment Note      Name: Herman Chirinos  Clinic Number: [id number]    Therapy Diagnosis:   Encounter Diagnosis   Name Primary?    Impaired mobility and ADLs Yes     Physician: Yoana Cleveland MD    Visit Date: 4/18/2024      Physician Orders: PT Eval and Treat   Medical Diagnosis from Referral: M54.50,G89.29 (ICD-10-CM) - Chronic bilateral low back pain without sciatica M54.16 (ICD-10-CM) - Lumbar radiculopathy  Evaluation Date: 3/7/2024  Authorization Period Expiration: 2/18/2025  Plan of Care Expiration: 5/3/2024  Progress Note Due: 5/3/2025  Date of Surgery: n/a  Visit # / Visits authorized: 1/1; 12/20   FOTO: 1/ 3     Precautions: Standard        PTA Visit #: 1/5     Time In: 1000 AM   Time Out: 11:00 AM   Total Billable Time: 30 min 1:1    Subjective     Patient reports: patient says the tingling in his legs is getting better but he still thinks "there is a lot of work to do".      He was compliant with home exercise program.  Response to previous treatment: no adverse response to IE  Functional change: none yet reported     Pain: 2/10  Location: bilateral low back     Objective      Objective Measures updated at progress report unless specified.     Treatment     Herman received the treatments listed below:      therapeutic exercises to develop strength, ROM, flexibility, posture, and core stabilization for 40 minutes including:     - NuStep x 5 min   - seated thoracic extension 20x 5 sec hold  - seated sciatic nerve glide 20x bilatearlly  - standing hamstring stretch 5x20s  - lower trunk rotations x 10  - side lying clam with green thera band 2 x 15 reps  - sidelying open books 10x both sides  - seated CTJ mobilization 20x 5 sec hold     therapeutic activities to improve functional performance for 20 minutes, including:     - sit to stand from 20" mat without upper extremity assist 3 x 10 reps  - resisted side stepping with GTB 5 laps along mat + 5# KB " chest press  - forward steps ups + contralateral hip flexion on bottom step 3x10 each 2# bilateral ankle  - lateral step ups 3x10 reps both sides  (NP)  - hip hinging with dowel 3x10 reps for reinforcement of proper body mechanics     Herman received the following manual therapy techniques: Joint mobilizations and Manual traction were applied to the: lumbar spine for 00 minutes, including:     Prone thoracic mobilization PA grade 3-4  Supine lumbar traction unilateral     Patient Education and Home Exercises       Education provided:   - HEP review  - benefits of nerve mobilization and reasoning  - avoidance of aggravating factors that place increased tension on nerve such as driving     Written Home Exercises Provided: yes. Exercises were reviewed and Herman was able to demonstrate them prior to the end of the session. Herman demonstrated good  understanding of the education provided. See Electronic Medical Record under Patient Instructions for exercises provided during therapy sessions    Assessment     Patient was able to complete full therapy session without onset of back pain or radicular symptoms.  Discontinued bridging due to reproduction of lower extremity pain.  Will continue to monitor and progress patient as tolerated.     Herman Is progressing well towards his goals.   Pt prognosis is Good.     Pt will continue to benefit from skilled outpatient physical therapy to address the deficits listed in the problem list box on initial evaluation, provide pt/family education and to maximize pt's level of independence in the home and community environment.     Pt's spiritual, cultural and educational needs considered and pt agreeable to plan of care and goals.     Anticipated barriers to physical therapy: none     Goals:     Short Term Goals: 4 weeks   1. Patient will demonstrate good transverse abdominal muscle contraction for improved deep abdominal strength and lumbar stability. (PROGRESSING, NOT MET)   2.  Increase lumbar ROM to 100% of WNL in order to improve functional mobility.   (PROGRESSING, NOT MET)   3. Patient will demonstrate good sitting/standing posture and body mechanics for improved spine health and decreased risk of future injury. (PROGRESSING, NOT MET)   4. Patient will improve bilateral lower extremity MMT grades by >/=1/2 grade in order to improve strength for standing ADLs.  (PROGRESSING, NOT MET)   5. Patient will score >/= 10 repetitions on 30-Second Sit to  order to improve bilateral lower extremity muscular power for transfers.  (PROGRESSING, NOT MET)   6. Patient will be compliant with home exercise program to supplement therapy in promoting functional mobility. (PROGRESSING, NOT MET)      Long Term Goals: 8 weeks  1. Patient will improve FOTO score to </= 38% limited to decrease perceived limitation with maintaining/changing body position. (PROGRESSING, NOT MET)   2. Patient will be 100% compliant with updated HEP in order to maximize PT benefits post-discharge. (PROGRESSING, NOT MET)   3. Patient will improve bilateral lower extremity MMT grades by >/=1 grade in order to improve strength for standing activities of daily living. (PROGRESSING, NOT MET)   4. Patient will score >/= 12 repetitions on 30-Second Sit to  order to improve bilateral lower extremity muscular power for transfers.  (PROGRESSING, NOT MET)   5. Patient will report pain at worst over 2-week period as </=2/10 in order to improve general activity tolerance. (PROGRESSING, NOT MET)   6. Pt will begin some form of home/community fitness in order to sustain progress gained in physical therapy. (PROGRESSING, NOT MET)     Plan     Continue with Physical Therapist Plan of Care.     Shobha Mendes, PT

## 2024-04-23 ENCOUNTER — CLINICAL SUPPORT (OUTPATIENT)
Dept: REHABILITATION | Facility: HOSPITAL | Age: 74
End: 2024-04-23
Payer: MEDICARE

## 2024-04-23 DIAGNOSIS — Z78.9 IMPAIRED MOBILITY AND ADLS: Primary | ICD-10-CM

## 2024-04-23 DIAGNOSIS — Z74.09 IMPAIRED MOBILITY AND ADLS: Primary | ICD-10-CM

## 2024-04-23 PROCEDURE — 97530 THERAPEUTIC ACTIVITIES: CPT

## 2024-04-23 PROCEDURE — 97110 THERAPEUTIC EXERCISES: CPT

## 2024-04-23 NOTE — PROGRESS NOTES
[facility]   Physical Therapy Treatment Note      Name: Herman Chirinos  Clinic Number: [id number]    Therapy Diagnosis:   Encounter Diagnosis   Name Primary?    Impaired mobility and ADLs Yes     Physician: Yoana Cleveland MD    Visit Date: 4/23/2024      Physician Orders: PT Eval and Treat   Medical Diagnosis from Referral: M54.50,G89.29 (ICD-10-CM) - Chronic bilateral low back pain without sciatica M54.16 (ICD-10-CM) - Lumbar radiculopathy  Evaluation Date: 3/7/2024  Authorization Period Expiration: 2/18/2025  Plan of Care Expiration: 5/3/2024  Progress Note Due: 5/3/2025  Date of Surgery: n/a  Visit # / Visits authorized: 1/1; 13/20   FOTO: 1/ 3     Precautions: Standard        PTA Visit #: 1/5     Time In: 1000 AM   Time Out: 11:00 AM   Total Billable Time: 30 min 1:1    Subjective     Patient reports: improvement in radicular symptoms since starting physical therapy.      He was compliant with home exercise program.  Response to previous treatment: no adverse response to IE  Functional change: none yet reported     Pain: 2/10  Location: bilateral low back     Objective      Objective Measures updated at progress report unless specified.     Treatment     Herman received the treatments listed below:      therapeutic exercises to develop strength, ROM, flexibility, posture, and core stabilization for 30 minutes including:     - NuStep x 6 min   - seated thoracic extension 20x 5 sec hold  - seated sciatic nerve glide 20x bilatearlly  - standing hamstring stretch 5x20s  - lower trunk rotations x 10  - side lying clam with green thera band 2 x 15 reps  - sidelying open books 10x both sides - NP  - seated CTJ mobilization 20x 5 sec hold     therapeutic activities to improve functional performance for 30 minutes, including:     - sit to stand from chair holding 10# KB 2 x 10 reps  - resisted walking (in all 4 directions) with GTB 5 x 10 reps each direction  - forward steps ups + contralateral  hip flexion on bottom step x 10 reps with 10# KB in contralateral upper extremity   - hip hinging with dowel 3x10 reps for reinforcement of proper body mechanics     Herman received the following manual therapy techniques: Joint mobilizations and Manual traction were applied to the: lumbar spine for 00 minutes, including:     Prone thoracic mobilization PA grade 3-4  Supine lumbar traction unilateral     Patient Education and Home Exercises       Education provided:   - HEP review  - benefits of nerve mobilization and reasoning  - avoidance of aggravating factors that place increased tension on nerve such as driving     Written Home Exercises Provided: yes. Exercises were reviewed and Herman was able to demonstrate them prior to the end of the session. Herman demonstrated good  understanding of the education provided. See Electronic Medical Record under Patient Instructions for exercises provided during therapy sessions    Assessment     Patient was able to complete full therapy session without onset of back pain or radicular symptoms.  Functional strengthening progressed with addition of resisted walking.  Will continue to monitor and progress patient as tolerated.     Herman Is progressing well towards his goals.   Pt prognosis is Good.     Pt will continue to benefit from skilled outpatient physical therapy to address the deficits listed in the problem list box on initial evaluation, provide pt/family education and to maximize pt's level of independence in the home and community environment.     Pt's spiritual, cultural and educational needs considered and pt agreeable to plan of care and goals.     Anticipated barriers to physical therapy: none     Goals:     Short Term Goals: 4 weeks   1. Patient will demonstrate good transverse abdominal muscle contraction for improved deep abdominal strength and lumbar stability. (PROGRESSING, NOT MET)   2. Increase lumbar ROM to 100% of WNL in order to improve functional  mobility.   (PROGRESSING, NOT MET)   3. Patient will demonstrate good sitting/standing posture and body mechanics for improved spine health and decreased risk of future injury. (PROGRESSING, NOT MET)   4. Patient will improve bilateral lower extremity MMT grades by >/=1/2 grade in order to improve strength for standing ADLs.  (PROGRESSING, NOT MET)   5. Patient will score >/= 10 repetitions on 30-Second Sit to  order to improve bilateral lower extremity muscular power for transfers.  (PROGRESSING, NOT MET)   6. Patient will be compliant with home exercise program to supplement therapy in promoting functional mobility. (PROGRESSING, NOT MET)      Long Term Goals: 8 weeks  1. Patient will improve FOTO score to </= 38% limited to decrease perceived limitation with maintaining/changing body position. (PROGRESSING, NOT MET)   2. Patient will be 100% compliant with updated HEP in order to maximize PT benefits post-discharge. (PROGRESSING, NOT MET)   3. Patient will improve bilateral lower extremity MMT grades by >/=1 grade in order to improve strength for standing activities of daily living. (PROGRESSING, NOT MET)   4. Patient will score >/= 12 repetitions on 30-Second Sit to  order to improve bilateral lower extremity muscular power for transfers.  (PROGRESSING, NOT MET)   5. Patient will report pain at worst over 2-week period as </=2/10 in order to improve general activity tolerance. (PROGRESSING, NOT MET)   6. Pt will begin some form of home/community fitness in order to sustain progress gained in physical therapy. (PROGRESSING, NOT MET)     Plan     Continue with Physical Therapist Plan of Care.     Shobha Mendes, PT

## 2024-04-25 ENCOUNTER — CLINICAL SUPPORT (OUTPATIENT)
Dept: REHABILITATION | Facility: HOSPITAL | Age: 74
End: 2024-04-25
Payer: MEDICARE

## 2024-04-25 DIAGNOSIS — Z74.09 IMPAIRED MOBILITY AND ADLS: Primary | ICD-10-CM

## 2024-04-25 DIAGNOSIS — Z78.9 IMPAIRED MOBILITY AND ADLS: Primary | ICD-10-CM

## 2024-04-25 PROCEDURE — 97530 THERAPEUTIC ACTIVITIES: CPT

## 2024-04-25 PROCEDURE — 97110 THERAPEUTIC EXERCISES: CPT

## 2024-04-25 NOTE — PROGRESS NOTES
[facility]   Physical Therapy Treatment Note      Name: Herman Chirinos  Clinic Number: [id number]    Therapy Diagnosis:   Encounter Diagnosis   Name Primary?    Impaired mobility and ADLs Yes     Physician: Yoana Cleveland MD    Visit Date: 4/25/2024      Physician Orders: PT Eval and Treat   Medical Diagnosis from Referral: M54.50,G89.29 (ICD-10-CM) - Chronic bilateral low back pain without sciatica M54.16 (ICD-10-CM) - Lumbar radiculopathy  Evaluation Date: 3/7/2024  Authorization Period Expiration: 2/18/2025  Plan of Care Expiration: 5/3/2024  Progress Note Due: 5/3/2025  Date of Surgery: n/a  Visit # / Visits authorized: 1/1; 14/20   FOTO: 1/ 3     Precautions: Standard        PTA Visit #: 1/5     Time In: 1000 AM   Time Out: 10:55 AM   Total Billable Time: 55 min    Subjective     Patient reports: improvement in radicular symptoms since starting physical therapy. He reports main symptoms will come on at end of the day when laying supine.     He was compliant with home exercise program.  Response to previous treatment: none reported  Functional change: improved walking tolerance     Pain: 2/10  Location: bilateral low back     Objective      Negative Slump and SLR bilaterally    Treatment     Herman received the treatments listed below:      therapeutic exercises to develop strength, ROM, flexibility, posture, and core stabilization for 30 minutes including:     - NuStep x 6 min lvl 4.0  - seated thoracic extension 20x 5 sec hold with half foam roll and hands behind back  - seated sciatic nerve glide 20x bilatearlly  - standing hamstring stretch 5x20s  - lower trunk rotations x 10  - side lying clam with green thera band 2 x 15 reps    NP  seated CTJ mobilization 20x 5 sec hold  side lying open books 10x both sides     therapeutic activities to improve functional performance for 25 minutes, including:     - sit to stand from chair holding 10# KB 3x8 reps  - resisted walking (in all 4  directions) with blue thera cord 5 x 10 reps each direction  - forward steps ups + contralateral hip flexion on bottom step x 10 reps with 10# KB in contralateral upper extremity   - hip hinging with dowel 3x10 reps for reinforcement of proper body mechanics     Herman received the following manual therapy techniques: Joint mobilizations and Manual traction were applied to the: lumbar spine for 00 minutes, including:     Prone thoracic mobilization PA grade 3-4  Supine lumbar traction unilateral     Patient Education and Home Exercises       Education provided:   - HEP review  - benefits of nerve mobilization and reasoning  - avoidance of aggravating factors that place increased tension on nerve such as driving     Written Home Exercises Provided: yes. Exercises were reviewed and Herman was able to demonstrate them prior to the end of the session. Herman demonstrated good  understanding of the education provided. See Electronic Medical Record under Patient Instructions for exercises provided during therapy sessions    Assessment     Patient continues to complain of radicular symptoms bilaterally, but absence of positive testing for adverse neural tension down lower extremities. Patient is improved with functional mobility and tolerance to daily activities. Patient able to tolerate all progressions in volume today without symptoms.  Will continue to monitor and progress patient as tolerated.     Herman Is progressing well towards his goals.   Pt prognosis is Good.     Pt will continue to benefit from skilled outpatient physical therapy to address the deficits listed in the problem list box on initial evaluation, provide pt/family education and to maximize pt's level of independence in the home and community environment.     Pt's spiritual, cultural and educational needs considered and pt agreeable to plan of care and goals.     Anticipated barriers to physical therapy: none     Goals:     Short Term Goals: 4 weeks    1. Patient will demonstrate good transverse abdominal muscle contraction for improved deep abdominal strength and lumbar stability. (PROGRESSING, NOT MET)   2. Increase lumbar ROM to 100% of WNL in order to improve functional mobility.   (PROGRESSING, NOT MET)   3. Patient will demonstrate good sitting/standing posture and body mechanics for improved spine health and decreased risk of future injury. (PROGRESSING, NOT MET)   4. Patient will improve bilateral lower extremity MMT grades by >/=1/2 grade in order to improve strength for standing ADLs.  (PROGRESSING, NOT MET)   5. Patient will score >/= 10 repetitions on 30-Second Sit to  order to improve bilateral lower extremity muscular power for transfers.  (PROGRESSING, NOT MET)   6. Patient will be compliant with home exercise program to supplement therapy in promoting functional mobility. (PROGRESSING, NOT MET)      Long Term Goals: 8 weeks  1. Patient will improve FOTO score to </= 38% limited to decrease perceived limitation with maintaining/changing body position. (PROGRESSING, NOT MET)   2. Patient will be 100% compliant with updated HEP in order to maximize PT benefits post-discharge. (PROGRESSING, NOT MET)   3. Patient will improve bilateral lower extremity MMT grades by >/=1 grade in order to improve strength for standing activities of daily living. (PROGRESSING, NOT MET)   4. Patient will score >/= 12 repetitions on 30-Second Sit to  order to improve bilateral lower extremity muscular power for transfers.  (PROGRESSING, NOT MET)   5. Patient will report pain at worst over 2-week period as </=2/10 in order to improve general activity tolerance. (PROGRESSING, NOT MET)   6. Pt will begin some form of home/community fitness in order to sustain progress gained in physical therapy. (PROGRESSING, NOT MET)     Plan     Continue with Physical Therapist Plan of Care.     Roberto Ash, PT

## 2024-04-29 ENCOUNTER — CLINICAL SUPPORT (OUTPATIENT)
Dept: REHABILITATION | Facility: HOSPITAL | Age: 74
End: 2024-04-29
Payer: MEDICARE

## 2024-04-29 DIAGNOSIS — Z78.9 IMPAIRED MOBILITY AND ADLS: Primary | ICD-10-CM

## 2024-04-29 DIAGNOSIS — Z74.09 IMPAIRED MOBILITY AND ADLS: Primary | ICD-10-CM

## 2024-04-29 DIAGNOSIS — E78.2 MIXED HYPERLIPIDEMIA: ICD-10-CM

## 2024-04-29 PROCEDURE — 97110 THERAPEUTIC EXERCISES: CPT

## 2024-04-29 PROCEDURE — 97530 THERAPEUTIC ACTIVITIES: CPT

## 2024-04-29 RX ORDER — PRAVASTATIN SODIUM 80 MG/1
80 TABLET ORAL
Qty: 90 TABLET | Refills: 3 | Status: SHIPPED | OUTPATIENT
Start: 2024-04-29

## 2024-04-29 NOTE — PROGRESS NOTES
[facility]   Physical Therapy Treatment Note      Name: Herman Chirinos  Clinic Number: [id number]    Therapy Diagnosis:   Encounter Diagnosis   Name Primary?    Impaired mobility and ADLs Yes     Physician: Yoana Cleveland MD    Visit Date: 4/29/2024      Physician Orders: PT Eval and Treat   Medical Diagnosis from Referral: M54.50,G89.29 (ICD-10-CM) - Chronic bilateral low back pain without sciatica M54.16 (ICD-10-CM) - Lumbar radiculopathy  Evaluation Date: 3/7/2024  Authorization Period Expiration: 2/18/2025  Plan of Care Expiration: 5/3/2024  Progress Note Due: 5/3/2025  Date of Surgery: n/a  Visit # / Visits authorized: 1/1; 14/20   FOTO: 1/ 3     Precautions: Standard        PTA Visit #: 1/5     Time In: 1000 AM   Time Out: 10:55 AM   Total Billable Time: 55 min (30 minutes 1:1)    Subjective     Patient reports: onset of leg symptoms in the morning occasionally that will still dissipate following nerve mobilizations.     He was compliant with home exercise program.  Response to previous treatment: none reported  Functional change: improved walking tolerance     Pain: 2/10  Location: bilateral low back     Objective        Treatment     Herman received the treatments listed below:      therapeutic exercises to develop strength, ROM, flexibility, posture, and core stabilization for 30 minutes including:     - NuStep x 6 min lvl 4.0 (at end)  - seated thoracic extension 20x 5 sec hold with half foam roll and hands behind back  - seated sciatic nerve glide 20x bilatearlly  - standing hamstring stretch 5x20s  - lower trunk rotations x 10  - side lying clam with green thera band 2 x 15 reps    NP  seated CTJ mobilization 20x 5 sec hold  side lying open books 10x both sides     therapeutic activities to improve functional performance for 25 minutes, including:     - sit to stand from chair holding 10# KB 3x10 reps  - forward steps ups + contralateral hip flexion on bottom step x 10 reps  with 10# KB in contralateral upper extremity   - hip hinging with dowel 3x10 reps for reinforcement of proper body mechanics  - farmer walks 5lb KB 4 laps alternating arms  - resisted walking (in all 4 directions) with black thera cord 5 x 10 reps each direction     Herman received the following manual therapy techniques: Joint mobilizations and Manual traction were applied to the: lumbar spine for 00 minutes, including:     Prone thoracic mobilization PA grade 3-4  Supine lumbar traction unilateral     Patient Education and Home Exercises       Education provided:   - HEP review  - benefits of nerve mobilization and reasoning  - avoidance of aggravating factors that place increased tension on nerve such as driving     Written Home Exercises Provided: yes. Exercises were reviewed and Herman was able to demonstrate them prior to the end of the session. Herman demonstrated good  understanding of the education provided. See Electronic Medical Record under Patient Instructions for exercises provided during therapy sessions    Assessment     Patient continues to have intermittent lower extremity symptoms that improve with nerve mobilizations despite negative neural tension testing. Patient progressed with therapeutic activities and functional core training today with no adverse symptoms. Will continue to monitor and progress patient as tolerated.     Herman Is progressing well towards his goals.   Pt prognosis is Good.     Pt will continue to benefit from skilled outpatient physical therapy to address the deficits listed in the problem list box on initial evaluation, provide pt/family education and to maximize pt's level of independence in the home and community environment.     Pt's spiritual, cultural and educational needs considered and pt agreeable to plan of care and goals.     Anticipated barriers to physical therapy: none     Goals:     Short Term Goals: 4 weeks   1. Patient will demonstrate good transverse  abdominal muscle contraction for improved deep abdominal strength and lumbar stability. (PROGRESSING, NOT MET)   2. Increase lumbar ROM to 100% of WNL in order to improve functional mobility.   (PROGRESSING, NOT MET)   3. Patient will demonstrate good sitting/standing posture and body mechanics for improved spine health and decreased risk of future injury. (PROGRESSING, NOT MET)   4. Patient will improve bilateral lower extremity MMT grades by >/=1/2 grade in order to improve strength for standing ADLs.  (PROGRESSING, NOT MET)   5. Patient will score >/= 10 repetitions on 30-Second Sit to  order to improve bilateral lower extremity muscular power for transfers.  (PROGRESSING, NOT MET)   6. Patient will be compliant with home exercise program to supplement therapy in promoting functional mobility. (PROGRESSING, NOT MET)      Long Term Goals: 8 weeks  1. Patient will improve FOTO score to </= 38% limited to decrease perceived limitation with maintaining/changing body position. (PROGRESSING, NOT MET)   2. Patient will be 100% compliant with updated HEP in order to maximize PT benefits post-discharge. (PROGRESSING, NOT MET)   3. Patient will improve bilateral lower extremity MMT grades by >/=1 grade in order to improve strength for standing activities of daily living. (PROGRESSING, NOT MET)   4. Patient will score >/= 12 repetitions on 30-Second Sit to  order to improve bilateral lower extremity muscular power for transfers.  (PROGRESSING, NOT MET)   5. Patient will report pain at worst over 2-week period as </=2/10 in order to improve general activity tolerance. (PROGRESSING, NOT MET)   6. Pt will begin some form of home/community fitness in order to sustain progress gained in physical therapy. (PROGRESSING, NOT MET)     Plan     Continue with Physical Therapist Plan of Care.     Roberto Ash, PT

## 2024-04-29 NOTE — TELEPHONE ENCOUNTER
No care due was identified.  [facility] Embedded Care Due Messages. Reference number: [id number].   4/29/2024 9:30:14 AM CDT

## 2024-04-29 NOTE — TELEPHONE ENCOUNTER
Refill Decision Note   Herman Chirinos  is requesting a refill authorization.  Brief Assessment and Rationale for Refill:  Approve     Medication Therapy Plan:        Comments:     Note composed:12:15 PM 04/29/2024

## 2024-05-02 ENCOUNTER — CLINICAL SUPPORT (OUTPATIENT)
Dept: REHABILITATION | Facility: HOSPITAL | Age: 74
End: 2024-05-02
Payer: MEDICARE

## 2024-05-02 DIAGNOSIS — Z74.09 IMPAIRED MOBILITY AND ADLS: Primary | ICD-10-CM

## 2024-05-02 DIAGNOSIS — Z78.9 IMPAIRED MOBILITY AND ADLS: Primary | ICD-10-CM

## 2024-05-02 PROCEDURE — 97530 THERAPEUTIC ACTIVITIES: CPT | Mod: CQ

## 2024-05-02 PROCEDURE — 97110 THERAPEUTIC EXERCISES: CPT | Mod: CQ

## 2024-05-02 NOTE — PROGRESS NOTES
[facility]   Physical Therapy Treatment Note      Name: Herman Chirinos  Clinic Number: [id number]    Therapy Diagnosis:   Encounter Diagnosis   Name Primary?    Impaired mobility and ADLs Yes     Physician: Yoana Cleveland MD    Visit Date: 5/2/2024    Physician Orders: PT Eval and Treat   Medical Diagnosis from Referral: M54.50,G89.29 (ICD-10-CM) - Chronic bilateral low back pain without sciatica M54.16 (ICD-10-CM) - Lumbar radiculopathy  Evaluation Date: 3/7/2024  Authorization Period Expiration: 2/18/2025  Plan of Care Expiration: 5/3/2024  Progress Note Due: 5/3/2025  Date of Surgery: n/a  Visit # / Visits authorized: 1/1; 14/20   FOTO: 1/ 3     Precautions: Standard         PTA Visit #: 1/5     Time In: 1005  Time Out: 1100  Total Billable Time: 55 minutes    Subjective     Pt reports: no new or worsening symptoms. Patient states that the pain comes and goes getting up to a 6/10 at the worst.    He was compliant with home exercise program.  Response to previous treatment: no change  Functional change: none    Pain: 2/10  Location: bilateral low back      Objective      FOTO 5/2/24  30 limitation   Objective Measures updated at progress report unless specified.     Treatment     Herman received the treatments listed below:      therapeutic exercises to develop strength, ROM, flexibility, posture, and core stabilization for 30 minutes including:     - NuStep x 6 min lvl 4.0 (at end)  - seated thoracic extension 20x 5 sec hold with half foam roll and hands behind back  - seated sciatic nerve glide 20x bilatearlly  - standing hamstring stretch 5x20s  - lower trunk rotations x 10  - side lying clam with green thera band 2 x 15 reps     NP  seated CTJ mobilization 20x 5 sec hold  side lying open books 10x both sides     therapeutic activities to improve functional performance for 25 minutes, including:     - sit to stand from chair holding 10# KB 3x10 reps  - forward steps ups +  contralateral hip flexion on bottom step x 10 reps with 10# KB in contralateral upper extremity   - hip hinging with dowel 3x10 reps for reinforcement of proper body mechanics  - farmer walks 5lb KB 4 laps alternating arms  - resisted walking (in all 4 directions) with black thera cord 2 x 10 reps each direction     Herman received the following manual therapy techniques: Joint mobilizations and Manual traction were applied to the: lumbar spine for 00 minutes, including:     Prone thoracic mobilization PA grade 3-4  Supine lumbar traction unilateral     Patient Education and Home Exercises       Education provided:   - HEP    Written Home Exercises Provided: yes. Exercises were reviewed and Herman was able to demonstrate them prior to the end of the session.  Herman demonstrated good  understanding of the education provided. See EMR under Patient Instructions for exercises provided during therapy sessions    Assessment     Patient tolerated all exercises well. Patient with some fatigue throughout sit to stands, step ups, and farmers walk, however, after seated rest break between therapeutic exercise patient with was able all reps. Physical Therapist discussed with patient today being his last day due to patient feeling better lately and is now educated on things to do at home to continue to decrease symptoms outside of Physical Therapy treatment. Patient agreed on today being discharged today. Per Physical Therapist Plan of Care patient being discharged today. Patient also given thera band to continue to strengthening outside of Physical Therapy treatment.     Herman Is progressing well towards his goals.   Pt prognosis is Good.     Pt will continue to benefit from skilled outpatient physical therapy to address the deficits listed in the problem list box on initial evaluation, provide pt/family education and to maximize pt's level of independence in the home and community environment.     Pt's spiritual, cultural  and educational needs considered and pt agreeable to plan of care and goals.     Anticipated barriers to physical therapy: none     Goals:   Short Term Goals: 4 weeks   1. Patient will demonstrate good transverse abdominal muscle contraction for improved deep abdominal strength and lumbar stability. (PROGRESSING, NOT MET)   2. Increase lumbar ROM to 100% of WNL in order to improve functional mobility.   (PROGRESSING, NOT MET)   3. Patient will demonstrate good sitting/standing posture and body mechanics for improved spine health and decreased risk of future injury. (PROGRESSING, NOT MET)   4. Patient will improve bilateral lower extremity MMT grades by >/=1/2 grade in order to improve strength for standing ADLs.  (PROGRESSING, NOT MET)   5. Patient will score >/= 10 repetitions on 30-Second Sit to  order to improve bilateral lower extremity muscular power for transfers.  (PROGRESSING, NOT MET)   6. Patient will be compliant with home exercise program to supplement therapy in promoting functional mobility. (PROGRESSING, NOT MET)      Long Term Goals: 8 weeks  1. Patient will improve FOTO score to </= 38% limited to decrease perceived limitation with maintaining/changing body position. (PROGRESSING, NOT MET)   2. Patient will be 100% compliant with updated HEP in order to maximize PT benefits post-discharge. (PROGRESSING, NOT MET)   3. Patient will improve bilateral lower extremity MMT grades by >/=1 grade in order to improve strength for standing activities of daily living. (PROGRESSING, NOT MET)   4. Patient will score >/= 12 repetitions on 30-Second Sit to  order to improve bilateral lower extremity muscular power for transfers.  (PROGRESSING, NOT MET)   5. Patient will report pain at worst over 2-week period as </=2/10 in order to improve general activity tolerance. (PROGRESSING, NOT MET)   6. Pt will begin some form of home/community fitness in order to sustain progress gained in physical therapy.  (PROGRESSING, NOT MET)     Plan     Continue with Physical Therapist Plan of Care.     PT/PTA met face to face to discuss pt's treatment plan and progress towards established goals. Pt will be seen by a physical therapist minimally every 6th visit or every 30 days.      Adi Barnhart PTA

## 2024-05-03 DIAGNOSIS — N40.1 BPH WITH URINARY OBSTRUCTION: ICD-10-CM

## 2024-05-03 DIAGNOSIS — N13.8 BPH WITH URINARY OBSTRUCTION: ICD-10-CM

## 2024-05-03 RX ORDER — TAMSULOSIN HYDROCHLORIDE 0.4 MG/1
0.4 CAPSULE ORAL
Qty: 90 CAPSULE | Refills: 3 | Status: SHIPPED | OUTPATIENT
Start: 2024-05-03

## 2024-05-03 NOTE — TELEPHONE ENCOUNTER
No care due was identified.  Health [facility] Embedded Care Due Messages. Reference number: [id number].   5/03/2024 3:30:42 AM CDT

## 2024-05-03 NOTE — TELEPHONE ENCOUNTER
Refill Decision Note   Herman Chirinos  is requesting a refill authorization.  Brief Assessment and Rationale for Refill:  Approve     Medication Therapy Plan:         Comments:     Note composed:9:09 AM 05/03/2024

## 2024-05-20 ENCOUNTER — OFFICE VISIT (OUTPATIENT)
Dept: PAIN MEDICINE | Facility: CLINIC | Age: 74
End: 2024-05-20
Payer: MEDICARE

## 2024-05-20 VITALS
WEIGHT: 201.94 LBS | OXYGEN SATURATION: 98 % | TEMPERATURE: 98 F | BODY MASS INDEX: 27.39 KG/M2 | HEART RATE: 66 BPM | RESPIRATION RATE: 18 BRPM | DIASTOLIC BLOOD PRESSURE: 78 MMHG | SYSTOLIC BLOOD PRESSURE: 117 MMHG

## 2024-05-20 DIAGNOSIS — M25.562 CHRONIC PAIN OF LEFT KNEE: Primary | ICD-10-CM

## 2024-05-20 DIAGNOSIS — G89.29 CHRONIC PAIN OF LEFT KNEE: Primary | ICD-10-CM

## 2024-05-20 DIAGNOSIS — M17.12 PRIMARY OSTEOARTHRITIS OF LEFT KNEE: ICD-10-CM

## 2024-05-20 PROCEDURE — 3044F HG A1C LEVEL LT 7.0%: CPT | Mod: CPTII,S$GLB,, | Performed by: ANESTHESIOLOGY

## 2024-05-20 PROCEDURE — 1159F MED LIST DOCD IN RCRD: CPT | Mod: CPTII,S$GLB,, | Performed by: ANESTHESIOLOGY

## 2024-05-20 PROCEDURE — 3074F SYST BP LT 130 MM HG: CPT | Mod: CPTII,S$GLB,, | Performed by: ANESTHESIOLOGY

## 2024-05-20 PROCEDURE — 1125F AMNT PAIN NOTED PAIN PRSNT: CPT | Mod: CPTII,S$GLB,, | Performed by: ANESTHESIOLOGY

## 2024-05-20 PROCEDURE — 99999 PR PBB SHADOW E&M-EST. PATIENT-LVL III: CPT | Mod: PBBFAC,,, | Performed by: ANESTHESIOLOGY

## 2024-05-20 PROCEDURE — 3288F FALL RISK ASSESSMENT DOCD: CPT | Mod: CPTII,S$GLB,, | Performed by: ANESTHESIOLOGY

## 2024-05-20 PROCEDURE — 3078F DIAST BP <80 MM HG: CPT | Mod: CPTII,S$GLB,, | Performed by: ANESTHESIOLOGY

## 2024-05-20 PROCEDURE — 99214 OFFICE O/P EST MOD 30 MIN: CPT | Mod: S$GLB,,, | Performed by: ANESTHESIOLOGY

## 2024-05-20 PROCEDURE — 1101F PT FALLS ASSESS-DOCD LE1/YR: CPT | Mod: CPTII,S$GLB,, | Performed by: ANESTHESIOLOGY

## 2024-05-20 NOTE — H&P (VIEW-ONLY)
PCP: Avila Toledo MD    REFERRING PHYSICIAN: No ref. provider found    CHIEF COMPLAINT: Bilateral Knee Pain Left > Right    Original HISTORY OF PRESENT ILLNESS: Herman Chirinos presents to the clinic for the evaluation of the above pain. The pain started years ago and has been progressively worse.     Original Pain Description:  The pain is located in the bilateral knees and the left is much worse than the right. It does not radiate. The pain is described as aching, dull, and stabbing. Exacerbating factors: Walking, Getting out of bed/chair, and stumbling. Mitigating factors medications, physical therapy, and rest. Symptoms interfere with daily activity and falling asleep. The patient feels like symptoms have been worsening. Patient denies urinary incontinence, bowel incontinence, significant weight loss, significant motor weakness, and loss of sensations.    Original PAIN SCORES:  Best: Pain is 3  Worst: Pain is 10  Usually: Pain is 5  Current: Pain is 3    INTERVAL HISTORY:   Interval History 5/20/24: Herman Chirinos returns for follow up. At our last visit we met him and scheduled him for a left knee RFA. This was done on 6/6/23. He reports that he got 70% relief after that procedure. However, he presents back today, 11 months later noting that he is starting to limp again and he feels like the RFA is wearing off. The pain is in both medial and lateral joint lines, worse with pushing, lifting, long walking, improved with rest and massage. When it is really hurting him it will get up to a 6/10 and makes him limp. This is preventing him from maintaining his active lifestyle.     6 weeks of Conservative therapy (PT/Chiro/Home Exercises with Dates)  PT - March 2022 - helped ROM and strength     Treatments / Medications: (Ice/Heat/NSAIDS/APAP/etc):  Aleve - occasionally  Knee Sleeve      Report:  Consistent and Appropriate with report.    Interventional Pain Procedures: (Previous injections)  2 CSI in Left  Knee over the past few years with decreasing efficacy.   6/6/23: Left Knee Genicular RFA 70% relief for 11 months    Past Medical History:   Diagnosis Date    Appendicitis     Benign neoplasm of colon     Cataract     Chronic rhinitis     Colon polyps     Glaucoma     Hyperlipidemia     IFG (impaired fasting glucose) 1/8/2016    Lumbar stenosis     Osteoarthritis of both knees 5/24/2018    Tubular adenoma of colon: 4/16 repeat 2021 4/7/2017     Past Surgical History:   Procedure Laterality Date    APPENDECTOMY      CATARACT EXTRACTION W/  INTRAOCULAR LENS IMPLANT Right 10/3/2023    Procedure: EXTRACTION, CATARACT, WITH IOL INSERTION;  Surgeon: Riley Dougherty MD;  Location: [facility] OR;  Service: Ophthalmology;  Laterality: Right;    COLONOSCOPY  2007    repeat recommended in five years    COLONOSCOPY N/A 4/7/2016    Procedure: COLONOSCOPY;  Surgeon: Luis Yusuf MD;  Location: [facility] ENDO (4TH FLR);  Service: Endoscopy;  Laterality: N/A;  last procedure with Maciel, patient requesting very early appt    COLONOSCOPY N/A 7/8/2021    Procedure: COLONOSCOPY;  Surgeon: Austin Pettit MD;  Location: [facility] ENDO (4TH FLR);  Service: Endoscopy;  Laterality: N/A;  Fully vacc Covid-19 - pg    RADIOFREQUENCY ABLATION Left 6/6/2023    Procedure: RADIOFREQUENCY ABLATION, LEFT GENICULAR COOLED;  Surgeon: Debby Javier MD;  Location: [facility] PAIN MGT;  Service: Pain Management;  Laterality: Left;     Social History     Socioeconomic History    Marital status:     Number of children: 6   Occupational History    Occupation: [employer]     Employer: [employer]     Comment: [employer]   Tobacco Use    Smoking status: Never    Smokeless tobacco: Never   Substance and Sexual Activity    Alcohol use: No    Drug use: No    Sexual activity: Yes     Partners: Female   Social History Narrative     2020, lives alone. 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 Has a [employer] life. Active with yard work, walks. Prev  work as  at [employer].     Social Determinants of Health     Financial Resource Strain: Low Risk  (2/12/2024)    Overall Financial Resource Strain (CARDIA)     Difficulty of Paying Living Expenses: Not hard at all   Food Insecurity: No Food Insecurity (2/12/2024)    Hunger Vital Sign     Worried About Running Out of Food in the Last Year: Never true     Ran Out of Food in the Last Year: Never true   Transportation Needs: No Transportation Needs (2/12/2024)    PRAPARE - Transportation     Lack of Transportation (Medical): No     Lack of Transportation (Non-Medical): No   Physical Activity: Inactive (2/12/2024)    Exercise Vital Sign     Days of Exercise per Week: 0 days     Minutes of Exercise per Session: 20 min   Stress: Stress Concern Present (2/12/2024)    [facility] [employer] - Occupational Stress Questionnaire     Feeling of Stress : To some extent   Housing Stability: Low Risk  (2/12/2024)    Housing Stability Vital Sign     Unable to Pay for Housing in the Last Year: No     Number of Places Lived in the Last Year: 1     Unstable Housing in the Last Year: No     Family History   Problem Relation Name Age of Onset    Coronary artery disease Mother      Hypertension Mother      Heart disease Mother          heart transplant    Diabetes Mellitus Father      Diabetes Father      Kidney disease Sister      No Known Problems Sister      Kidney disease Brother      Cancer Brother          bladder ca    No Known Problems Brother      Cancer Daughter          br ca, CR    No Known Problems Daughter      No Known Problems Daughter      No Known Problems Son      No Known Problems Son      No Known Problems Son      No Known Problems Maternal Aunt      No Known Problems Maternal Uncle      No Known Problems Paternal Aunt      No Known Problems Paternal Uncle      No Known Problems Maternal Grandmother      No Known Problems Maternal Grandfather      No Known Problems Paternal Grandmother      No  Known Problems Paternal Grandfather      Stroke Neg Hx      Glaucoma Neg Hx      Cataracts Neg Hx      Blindness Neg Hx      Amblyopia Neg Hx      Macular degeneration Neg Hx      Retinal detachment Neg Hx      Strabismus Neg Hx      Thyroid disease Neg Hx      Psoriasis Neg Hx      Tremor Neg Hx      Parkinsonism Neg Hx         Review of patient's allergies indicates:  No Known Allergies    Current Outpatient Medications   Medication Sig    aspirin 81 MG Chew Take 1 tablet (81 mg total) by mouth once daily.    cholecalciferol, vitamin D3, (VITAMIN D3) 25 mcg (1,000 unit) capsule Take 1 capsule (1,000 Units total) by mouth once daily. (Patient not taking: Reported on 2/22/2024)    latanoprost 0.005 % ophthalmic solution INSTILL 1 DROP IN BOTH EYES EVERY EVENING    loratadine (CLARITIN) 10 mg tablet Take 1 tablet (10 mg total) by mouth once daily.    pravastatin (PRAVACHOL) 80 MG tablet TAKE 1 TABLET(80 MG) BY MOUTH EVERY DAY    tadalafiL (CIALIS) 20 MG Tab Take 1 tablet (20 mg total) by mouth daily as needed. Take 1 hour before intercourse    tamsulosin (FLOMAX) 0.4 mg Cap TAKE 1 CAPSULE(0.4 MG) BY MOUTH EVERY DAY     No current facility-administered medications for this visit.       ROS:  GENERAL: No fever. No chills. No fatigue. Denies weight loss. Denies weight gain.  HEENT: Denies headaches. Denies vision change. Denies eye pain. Denies double vision. Denies ear pain.   CV: Denies chest pain.   PULM: Denies of shortness of breath.  GI: Denies constipation. No diarrhea. No abdominal pain. Denies nausea. Denies vomiting. No blood in stool.  HEME: Denies bleeding problems.  : Denies urgency. No painful urination. No blood in urine.  MS: B/l Knee Pain.  SKIN: Denies rash.   NEURO: Denies seizures. No weakness.  PSYCH:  Denies difficulty sleeping. No anxiety. Denies depression. No suicidal thoughts.       VITALS:   Vitals:    05/20/24 1530   BP: 117/78   Pulse: 66   Resp: 18   Temp: 98.3 °F (36.8 °C)   SpO2: 98%    Weight: 91.6 kg (201 lb 15.1 oz)   PainSc:   3         PHYSICAL EXAM:   GENERAL: Well appearing, in no acute distress, alert and oriented x3.  PSYCH:  Mood and affect appropriate.  SKIN: Skin color, texture, turgor normal, no rashes or lesions.  HEENT:  Normocephalic, atraumatic. Cranial nerves grossly intact.  PULM: No evidence of respiratory difficulty, symmetric chest rise.  GI:  Non-distended  BACK: Normal range of motion.  EXTREMITIES: No edema.   MUSCULOSKELETAL: Left Knee: Full extension, but flexion only to 90. No instability. Pain to palpation along medial and lateral joint lines. No laxity or instability noted bilaterally. Hip provocative maneuvers are negative. Bilateral lower extremity strength is normal and symmetric. No atrophy is noted.  NEURO: Sensation is equal and appropriate bilaterally. Bilateral lower extremity coordination and muscle stretch reflexes are physiologic and symmetric. Plantar response are downgoing.   GAIT: Antalgic     LABS:  Reviewed in Epic:  Recent A1c 5.7  GFR >60   H/H wnl.    IMAGING:    Bilateral Knee Xrs 10/18/22  Bilateral tricompartmental osteoarthritis with severe medial joint space narrowing L>R.       ASSESSMENT: 73 y.o. year old male with bilateral knee pain, consistent with primary osteoarthritis of the knees.    DISCUSSION: Mr. Sutton is a pleasant gentleman who is referred by Dr. Bowles for left knee pain. His long-term plan is to have the knee replaced. He would like to increase his strength and ROM prior to surgery but has been unable to fully participate with PT due to his pain.    PLAN:  Reviewed Xrays with Taran  Schedule repeat Left Genicular RFA   Will follow up after RFA       Debby Javier  05/20/2024

## 2024-05-20 NOTE — PROGRESS NOTES
PCP: Avila Toledo MD    REFERRING PHYSICIAN: No ref. provider found    CHIEF COMPLAINT: Bilateral Knee Pain Left > Right    Original HISTORY OF PRESENT ILLNESS: Herman Chirinos presents to the clinic for the evaluation of the above pain. The pain started years ago and has been progressively worse.     Original Pain Description:  The pain is located in the bilateral knees and the left is much worse than the right. It does not radiate. The pain is described as aching, dull, and stabbing. Exacerbating factors: Walking, Getting out of bed/chair, and stumbling. Mitigating factors medications, physical therapy, and rest. Symptoms interfere with daily activity and falling asleep. The patient feels like symptoms have been worsening. Patient denies urinary incontinence, bowel incontinence, significant weight loss, significant motor weakness, and loss of sensations.    Original PAIN SCORES:  Best: Pain is 3  Worst: Pain is 10  Usually: Pain is 5  Current: Pain is 3    INTERVAL HISTORY:   Interval History 5/20/24: Herman Chirinos returns for follow up. At our last visit we met him and scheduled him for a left knee RFA. This was done on 6/6/23. He reports that he got 70% relief after that procedure. However, he presents back today, 11 months later noting that he is starting to limp again and he feels like the RFA is wearing off. The pain is in both medial and lateral joint lines, worse with pushing, lifting, long walking, improved with rest and massage. When it is really hurting him it will get up to a 6/10 and makes him limp. This is preventing him from maintaining his active lifestyle.     6 weeks of Conservative therapy (PT/Chiro/Home Exercises with Dates)  PT - March 2022 - helped ROM and strength     Treatments / Medications: (Ice/Heat/NSAIDS/APAP/etc):  Aleve - occasionally  Knee Sleeve      Report:  Consistent and Appropriate with report.    Interventional Pain Procedures: (Previous injections)  2 CSI in Left  Knee over the past few years with decreasing efficacy.   6/6/23: Left Knee Genicular RFA 70% relief for 11 months    Past Medical History:   Diagnosis Date    Appendicitis     Benign neoplasm of colon     Cataract     Chronic rhinitis     Colon polyps     Glaucoma     Hyperlipidemia     IFG (impaired fasting glucose) 1/8/2016    Lumbar stenosis     Osteoarthritis of both knees 5/24/2018    Tubular adenoma of colon: 4/16 repeat 2021 4/7/2017     Past Surgical History:   Procedure Laterality Date    APPENDECTOMY      CATARACT EXTRACTION W/  INTRAOCULAR LENS IMPLANT Right 10/3/2023    Procedure: EXTRACTION, CATARACT, WITH IOL INSERTION;  Surgeon: Riley Dougehrty MD;  Location: [facility] OR;  Service: Ophthalmology;  Laterality: Right;    COLONOSCOPY  2007    repeat recommended in five years    COLONOSCOPY N/A 4/7/2016    Procedure: COLONOSCOPY;  Surgeon: Luis Yusuf MD;  Location: [facility] ENDO (4TH FLR);  Service: Endoscopy;  Laterality: N/A;  last procedure with Maciel, patient requesting very early appt    COLONOSCOPY N/A 7/8/2021    Procedure: COLONOSCOPY;  Surgeon: Austin Pettit MD;  Location: [facility] ENDO (4TH FLR);  Service: Endoscopy;  Laterality: N/A;  Fully vacc Covid-19 - pg    RADIOFREQUENCY ABLATION Left 6/6/2023    Procedure: RADIOFREQUENCY ABLATION, LEFT GENICULAR COOLED;  Surgeon: Debby Javier MD;  Location: [facility] PAIN MGT;  Service: Pain Management;  Laterality: Left;     Social History     Socioeconomic History    Marital status:     Number of children: 6   Occupational History    Occupation: [employer]     Employer: [employer]     Comment: [employer]   Tobacco Use    Smoking status: Never    Smokeless tobacco: Never   Substance and Sexual Activity    Alcohol use: No    Drug use: No    Sexual activity: Yes     Partners: Female   Social History Narrative     2020, lives alone. 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 Has a [employer] life. Active with yard work, walks. Prev  work as  at [employer].     Social Determinants of Health     Financial Resource Strain: Low Risk  (2/12/2024)    Overall Financial Resource Strain (CARDIA)     Difficulty of Paying Living Expenses: Not hard at all   Food Insecurity: No Food Insecurity (2/12/2024)    Hunger Vital Sign     Worried About Running Out of Food in the Last Year: Never true     Ran Out of Food in the Last Year: Never true   Transportation Needs: No Transportation Needs (2/12/2024)    PRAPARE - Transportation     Lack of Transportation (Medical): No     Lack of Transportation (Non-Medical): No   Physical Activity: Inactive (2/12/2024)    Exercise Vital Sign     Days of Exercise per Week: 0 days     Minutes of Exercise per Session: 20 min   Stress: Stress Concern Present (2/12/2024)    [employer] - Occupational Stress Questionnaire     Feeling of Stress : To some extent   Housing Stability: Low Risk  (2/12/2024)    Housing Stability Vital Sign     Unable to Pay for Housing in the Last Year: No     Number of Places Lived in the Last Year: 1     Unstable Housing in the Last Year: No     Family History   Problem Relation Name Age of Onset    Coronary artery disease Mother      Hypertension Mother      Heart disease Mother          heart transplant    Diabetes Mellitus Father      Diabetes Father      Kidney disease Sister      No Known Problems Sister      Kidney disease Brother      Cancer Brother          bladder ca    No Known Problems Brother      Cancer Daughter          br ca, CR    No Known Problems Daughter      No Known Problems Daughter      No Known Problems Son      No Known Problems Son      No Known Problems Son      No Known Problems Maternal Aunt      No Known Problems Maternal Uncle      No Known Problems Paternal Aunt      No Known Problems Paternal Uncle      No Known Problems Maternal Grandmother      No Known Problems Maternal Grandfather      No Known Problems Paternal Grandmother      No  Known Problems Paternal Grandfather      Stroke Neg Hx      Glaucoma Neg Hx      Cataracts Neg Hx      Blindness Neg Hx      Amblyopia Neg Hx      Macular degeneration Neg Hx      Retinal detachment Neg Hx      Strabismus Neg Hx      Thyroid disease Neg Hx      Psoriasis Neg Hx      Tremor Neg Hx      Parkinsonism Neg Hx         Review of patient's allergies indicates:  No Known Allergies    Current Outpatient Medications   Medication Sig    aspirin 81 MG Chew Take 1 tablet (81 mg total) by mouth once daily.    cholecalciferol, vitamin D3, (VITAMIN D3) 25 mcg (1,000 unit) capsule Take 1 capsule (1,000 Units total) by mouth once daily. (Patient not taking: Reported on 2/22/2024)    latanoprost 0.005 % ophthalmic solution INSTILL 1 DROP IN BOTH EYES EVERY EVENING    loratadine (CLARITIN) 10 mg tablet Take 1 tablet (10 mg total) by mouth once daily.    pravastatin (PRAVACHOL) 80 MG tablet TAKE 1 TABLET(80 MG) BY MOUTH EVERY DAY    tadalafiL (CIALIS) 20 MG Tab Take 1 tablet (20 mg total) by mouth daily as needed. Take 1 hour before intercourse    tamsulosin (FLOMAX) 0.4 mg Cap TAKE 1 CAPSULE(0.4 MG) BY MOUTH EVERY DAY     No current facility-administered medications for this visit.       ROS:  GENERAL: No fever. No chills. No fatigue. Denies weight loss. Denies weight gain.  HEENT: Denies headaches. Denies vision change. Denies eye pain. Denies double vision. Denies ear pain.   CV: Denies chest pain.   PULM: Denies of shortness of breath.  GI: Denies constipation. No diarrhea. No abdominal pain. Denies nausea. Denies vomiting. No blood in stool.  HEME: Denies bleeding problems.  : Denies urgency. No painful urination. No blood in urine.  MS: B/l Knee Pain.  SKIN: Denies rash.   NEURO: Denies seizures. No weakness.  PSYCH:  Denies difficulty sleeping. No anxiety. Denies depression. No suicidal thoughts.       VITALS:   Vitals:    05/20/24 1530   BP: 117/78   Pulse: 66   Resp: 18   Temp: 98.3 °F (36.8 °C)   SpO2: 98%    Weight: 91.6 kg (201 lb 15.1 oz)   PainSc:   3         PHYSICAL EXAM:   GENERAL: Well appearing, in no acute distress, alert and oriented x3.  PSYCH:  Mood and affect appropriate.  SKIN: Skin color, texture, turgor normal, no rashes or lesions.  HEENT:  Normocephalic, atraumatic. Cranial nerves grossly intact.  PULM: No evidence of respiratory difficulty, symmetric chest rise.  GI:  Non-distended  BACK: Normal range of motion.  EXTREMITIES: No edema.   MUSCULOSKELETAL: Left Knee: Full extension, but flexion only to 90. No instability. Pain to palpation along medial and lateral joint lines. No laxity or instability noted bilaterally. Hip provocative maneuvers are negative. Bilateral lower extremity strength is normal and symmetric. No atrophy is noted.  NEURO: Sensation is equal and appropriate bilaterally. Bilateral lower extremity coordination and muscle stretch reflexes are physiologic and symmetric. Plantar response are downgoing.   GAIT: Antalgic     LABS:  Reviewed in Epic:  Recent A1c 5.7  GFR >60   H/H wnl.    IMAGING:    Bilateral Knee Xrs 10/18/22  Bilateral tricompartmental osteoarthritis with severe medial joint space narrowing L>R.       ASSESSMENT: 73 y.o. year old male with bilateral knee pain, consistent with primary osteoarthritis of the knees.    DISCUSSION: Mr. Sutton is a pleasant gentleman who is referred by Dr. Bowles for left knee pain. His long-term plan is to have the knee replaced. He would like to increase his strength and ROM prior to surgery but has been unable to fully participate with PT due to his pain.    PLAN:  Reviewed Xrays with Taran  Schedule repeat Left Genicular RFA   Will follow up after RFA       Debby Javier  05/20/2024

## 2024-05-21 ENCOUNTER — PATIENT MESSAGE (OUTPATIENT)
Dept: PAIN MEDICINE | Facility: OTHER | Age: 74
End: 2024-05-21
Payer: MEDICARE

## 2024-05-21 DIAGNOSIS — M17.12 PRIMARY OSTEOARTHRITIS OF LEFT KNEE: Primary | ICD-10-CM

## 2024-06-05 ENCOUNTER — PATIENT MESSAGE (OUTPATIENT)
Dept: ADMINISTRATIVE | Facility: OTHER | Age: 74
End: 2024-06-05
Payer: MEDICARE

## 2024-06-07 ENCOUNTER — PATIENT MESSAGE (OUTPATIENT)
Dept: PAIN MEDICINE | Facility: OTHER | Age: 74
End: 2024-06-07
Payer: MEDICARE

## 2024-06-10 ENCOUNTER — PATIENT MESSAGE (OUTPATIENT)
Dept: INTERNAL MEDICINE | Facility: CLINIC | Age: 74
End: 2024-06-10
Payer: MEDICARE

## 2024-06-11 ENCOUNTER — HOSPITAL ENCOUNTER (OUTPATIENT)
Facility: OTHER | Age: 74
Discharge: HOME | End: 2024-06-11
Attending: ANESTHESIOLOGY | Admitting: ANESTHESIOLOGY
Payer: MEDICARE

## 2024-06-11 VITALS
TEMPERATURE: 98 F | HEIGHT: 72 IN | HEART RATE: 68 BPM | WEIGHT: 220 LBS | SYSTOLIC BLOOD PRESSURE: 113 MMHG | DIASTOLIC BLOOD PRESSURE: 73 MMHG | OXYGEN SATURATION: 99 % | RESPIRATION RATE: 16 BRPM | BODY MASS INDEX: 29.8 KG/M2

## 2024-06-11 DIAGNOSIS — G89.29 CHRONIC PAIN: ICD-10-CM

## 2024-06-11 DIAGNOSIS — M17.0 OSTEOARTHRITIS OF BOTH KNEES, UNSPECIFIED OSTEOARTHRITIS TYPE: Primary | ICD-10-CM

## 2024-06-11 DIAGNOSIS — M25.562 CHRONIC PAIN OF LEFT KNEE: ICD-10-CM

## 2024-06-11 DIAGNOSIS — G89.29 CHRONIC PAIN OF LEFT KNEE: ICD-10-CM

## 2024-06-11 PROCEDURE — 25000003 PHARM REV CODE 250: Performed by: STUDENT IN AN ORGANIZED HEALTH CARE EDUCATION/TRAINING PROGRAM

## 2024-06-11 PROCEDURE — 64624 DSTRJ NULYT AGT GNCLR NRV: CPT | Mod: LT | Performed by: ANESTHESIOLOGY

## 2024-06-11 PROCEDURE — A4649 SURGICAL SUPPLIES: HCPCS | Performed by: ANESTHESIOLOGY

## 2024-06-11 PROCEDURE — 64624 DSTRJ NULYT AGT GNCLR NRV: CPT | Mod: LT,,, | Performed by: ANESTHESIOLOGY

## 2024-06-11 PROCEDURE — 99152 MOD SED SAME PHYS/QHP 5/>YRS: CPT | Mod: ,,, | Performed by: ANESTHESIOLOGY

## 2024-06-11 PROCEDURE — 99152 MOD SED SAME PHYS/QHP 5/>YRS: CPT | Performed by: ANESTHESIOLOGY

## 2024-06-11 PROCEDURE — 25000003 PHARM REV CODE 250: Performed by: ANESTHESIOLOGY

## 2024-06-11 PROCEDURE — 63600175 PHARM REV CODE 636 W HCPCS: Performed by: ANESTHESIOLOGY

## 2024-06-11 RX ORDER — SODIUM CHLORIDE 9 MG/ML
INJECTION, SOLUTION INTRAVENOUS CONTINUOUS
Status: DISCONTINUED | OUTPATIENT
Start: 2024-06-11 | End: 2024-06-11 | Stop reason: HOSPADM

## 2024-06-11 RX ORDER — LIDOCAINE HYDROCHLORIDE 20 MG/ML
INJECTION, SOLUTION INFILTRATION; PERINEURAL
Status: DISCONTINUED | OUTPATIENT
Start: 2024-06-11 | End: 2024-06-11 | Stop reason: HOSPADM

## 2024-06-11 RX ORDER — TRIAMCINOLONE ACETONIDE 40 MG/ML
INJECTION, SUSPENSION INTRA-ARTICULAR; INTRAMUSCULAR
Status: DISCONTINUED | OUTPATIENT
Start: 2024-06-11 | End: 2024-06-11 | Stop reason: HOSPADM

## 2024-06-11 RX ORDER — FENTANYL CITRATE 50 UG/ML
INJECTION, SOLUTION INTRAMUSCULAR; INTRAVENOUS
Status: DISCONTINUED | OUTPATIENT
Start: 2024-06-11 | End: 2024-06-11 | Stop reason: HOSPADM

## 2024-06-11 RX ORDER — BUPIVACAINE HYDROCHLORIDE 2.5 MG/ML
INJECTION, SOLUTION EPIDURAL; INFILTRATION; INTRACAUDAL
Status: DISCONTINUED | OUTPATIENT
Start: 2024-06-11 | End: 2024-06-11 | Stop reason: HOSPADM

## 2024-06-11 RX ORDER — MIDAZOLAM HYDROCHLORIDE 1 MG/ML
INJECTION INTRAMUSCULAR; INTRAVENOUS
Status: DISCONTINUED | OUTPATIENT
Start: 2024-06-11 | End: 2024-06-11 | Stop reason: HOSPADM

## 2024-06-11 NOTE — DISCHARGE INSTRUCTIONS

## 2024-06-11 NOTE — DISCHARGE SUMMARY
Discharge Note  Short Stay      SUMMARY     Admit Date: 6/11/2024    Attending Physician: Dr Javier      Discharge Physician: Gopal Davis      Discharge Date: 6/11/2024 1017AM    Procedure(s) (LRB):  RADIOFREQUENCY ABLATION LEFT GENICULAR *ASPIRIN OTC* HOLD FOR 5 DAYS (Left)    Final Diagnosis: Primary osteoarthritis of left knee [M17.12]    Disposition: Home or self care    Patient Instructions:   Current Discharge Medication List        CONTINUE these medications which have NOT CHANGED    Details   aspirin 81 MG Chew Take 1 tablet (81 mg total) by mouth once daily.  Qty: 30 tablet, Refills: 12    Associated Diagnoses: Annual physical exam      cholecalciferol, vitamin D3, (VITAMIN D3) 25 mcg (1,000 unit) capsule Take 1 capsule (1,000 Units total) by mouth once daily.  Qty: 30 capsule, Refills: 0    Associated Diagnoses: Vitamin D deficiency      latanoprost 0.005 % ophthalmic solution INSTILL 1 DROP IN BOTH EYES EVERY EVENING  Qty: 2.5 mL, Refills: 12    Associated Diagnoses: Primary open angle glaucoma of both eyes, mild stage      loratadine (CLARITIN) 10 mg tablet Take 1 tablet (10 mg total) by mouth once daily.  Qty: 1 Bottle, Refills: 0    Associated Diagnoses: Chronic rhinitis      pravastatin (PRAVACHOL) 80 MG tablet TAKE 1 TABLET(80 MG) BY MOUTH EVERY DAY  Qty: 90 tablet, Refills: 3    Associated Diagnoses: Mixed hyperlipidemia      tadalafiL (CIALIS) 20 MG Tab Take 1 tablet (20 mg total) by mouth daily as needed. Take 1 hour before intercourse  Qty: 20 tablet, Refills: 11      tamsulosin (FLOMAX) 0.4 mg Cap TAKE 1 CAPSULE(0.4 MG) BY MOUTH EVERY DAY  Qty: 90 capsule, Refills: 3    Associated Diagnoses: BPH with urinary obstruction                 Discharge Diagnosis: Primary osteoarthritis of left knee [M17.12]  Condition on Discharge: Stable with no complications to procedure   Diet on Discharge: Same as before.  Activity: as per instruction sheet.  Discharge to: Home with a responsible adult.  Follow  up: 2-4 weeks       Please call my office or pager at [phone] if experienced any weakness or loss of sensation, fever > 101.5, pain uncontrolled with oral medications, persistent nausea/vomiting/or diarrhea, redness or drainage from the incisions, or any other worrisome concerns. If physician on call was not reached or could not communicate with our office for any reason please go to the nearest emergency department

## 2024-06-11 NOTE — OP NOTE
Therapeutic Genicular Cooled Nerve Radiofrequency Ablation under Fluoroscopy     The procedure, risks, benefits, and options were discussed with the patient. There are no contraindications to the procedure. The patent expressed understanding and agreed to the procedure. Informed written consent was obtained prior to the start of the procedure and can be found in the patient's chart.        PATIENT NAME: Herman Chirinos   MRN: [id number]     DATE OF PROCEDURE: 06/11/2024     PROCEDURE: Therapeutic Left Genicular Cooled Nerve Radiofrequency Ablation under Fluoroscopy    PRE-OP DIAGNOSIS: Primary osteoarthritis of left knee [M17.12]    POST-OP DIAGNOSIS: Primary osteoarthritis of left knee [M17.12]    PHYSICIAN: Debby Javier MD    ASSISTANTS: Car Biswas MD    MEDICATIONS INJECTED:  Preservative-free Kenalog 40mg with 9cc of Bupivicaine 0.25%    LOCAL ANESTHETIC INJECTED:   Xylocaine 2%    SEDATION: Versed 1.5 mg and Fentanyl 50mcg                                                                                                                                                                                     Conscious sedation ordered by M.D. Patient re-evaluation prior to administration of conscious sedation. No changes noted in patient's status from initial evaluation. The patient's vital signs were monitored by RN and patient remained hemodynamically stable throughout the procedure.    Event Time In   Sedation Start 1000   Sedation End 1022       ESTIMATED BLOOD LOSS:  None    COMPLICATIONS:  None     INTERVAL HISTORY: Patient has clinical findings of chronic knee pain. Patients has completed 2 previous diagnostic genicular nerve blocks with at least 80% relief for the expected duration of the local anesthetic utilized.     TECHNIQUE: Time-out was performed to identify the patient and procedure to be performed. With the patient laying in a supine position, the surgical area was prepped and draped in the usual  sterile fashion using ChloraPrep and fenestrated drape. Three target sites including the superior lateral genicular nerve where the lateral femoral shaft meets the epicondyle, the superior medial genicular nerve where the medial femoral shaft meets the epicondyle, and the inferior medial genicular nerve where the medial tibial shaft meets the epicondyle, were determined under fluoroscopic guidance. Skin anesthesia was achieved by injecting Lidocaine 2% over the injection sites. A 17 gauge, 50mm, 10mm active tip needle was then advanced under fluoroscopy in the AP and lateral views into the positions of the geniculate nerves at these levels. This was followed by motor testing at each of the nerves to ensure that there was no dorsiflexion of the foot. After negative aspiration for blood was confirmed, 1.5 mL of the lidocaine 2% listed above was injected slowly at each site. This was followed by cooled thermal lesioning at 80 degrees celsius for 150 seconds at each site. That was followed by slowly injecting 1 mL of the medication mixture listed above at each site. The needles were removed and bleeding was nil. A sterile dressing was applied. No specimens collected. The patient tolerated the procedure well and did not have any procedure related motor deficit at the conclusion of the procedure.        The patient was monitored after the procedure in the recovery area. They were given post-procedure and discharge instructions to follow at home. The patient was discharged in a stable condition.    Gopal Davis MD     I reviewed and edited the fellow's note. I conducted my own interview and physical examination. I agree with the findings. I was present and supervising all critical portions of the procedure.

## 2024-07-02 ENCOUNTER — OFFICE VISIT (OUTPATIENT)
Dept: PAIN MEDICINE | Facility: CLINIC | Age: 74
End: 2024-07-02
Payer: MEDICARE

## 2024-07-02 VITALS
WEIGHT: 198.88 LBS | HEART RATE: 88 BPM | SYSTOLIC BLOOD PRESSURE: 89 MMHG | BODY MASS INDEX: 26.97 KG/M2 | TEMPERATURE: 98 F | DIASTOLIC BLOOD PRESSURE: 63 MMHG

## 2024-07-02 DIAGNOSIS — M25.562 CHRONIC PAIN OF LEFT KNEE: ICD-10-CM

## 2024-07-02 DIAGNOSIS — G89.29 CHRONIC PAIN OF LEFT KNEE: ICD-10-CM

## 2024-07-02 DIAGNOSIS — M17.12 PRIMARY OSTEOARTHRITIS OF LEFT KNEE: ICD-10-CM

## 2024-07-02 DIAGNOSIS — M17.0 OSTEOARTHRITIS OF BOTH KNEES, UNSPECIFIED OSTEOARTHRITIS TYPE: Primary | ICD-10-CM

## 2024-07-02 PROCEDURE — 1159F MED LIST DOCD IN RCRD: CPT | Mod: CPTII,S$GLB,,

## 2024-07-02 PROCEDURE — 99999 PR PBB SHADOW E&M-EST. PATIENT-LVL III: CPT | Mod: PBBFAC,,,

## 2024-07-02 PROCEDURE — 1125F AMNT PAIN NOTED PAIN PRSNT: CPT | Mod: CPTII,S$GLB,,

## 2024-07-02 PROCEDURE — 1160F RVW MEDS BY RX/DR IN RCRD: CPT | Mod: CPTII,S$GLB,,

## 2024-07-02 PROCEDURE — 3074F SYST BP LT 130 MM HG: CPT | Mod: CPTII,S$GLB,,

## 2024-07-02 PROCEDURE — 3044F HG A1C LEVEL LT 7.0%: CPT | Mod: CPTII,S$GLB,,

## 2024-07-02 PROCEDURE — 99213 OFFICE O/P EST LOW 20 MIN: CPT | Mod: S$GLB,,,

## 2024-07-02 PROCEDURE — 3288F FALL RISK ASSESSMENT DOCD: CPT | Mod: CPTII,S$GLB,,

## 2024-07-02 PROCEDURE — 3078F DIAST BP <80 MM HG: CPT | Mod: CPTII,S$GLB,,

## 2024-07-02 PROCEDURE — 3008F BODY MASS INDEX DOCD: CPT | Mod: CPTII,S$GLB,,

## 2024-07-02 PROCEDURE — 1101F PT FALLS ASSESS-DOCD LE1/YR: CPT | Mod: CPTII,S$GLB,,

## 2024-07-02 NOTE — PROGRESS NOTES
PCP: Avila Toledo MD    REFERRING PHYSICIAN: No ref. provider found    CHIEF COMPLAINT: Bilateral Knee Pain Left > Right    Original HISTORY OF PRESENT ILLNESS: Herman Chirinos presents to the clinic for the evaluation of the above pain. The pain started years ago and has been progressively worse.     Original Pain Description:  The pain is located in the bilateral knees and the left is much worse than the right. It does not radiate. The pain is described as aching, dull, and stabbing. Exacerbating factors: Walking, Getting out of bed/chair, and stumbling. Mitigating factors medications, physical therapy, and rest. Symptoms interfere with daily activity and falling asleep. The patient feels like symptoms have been worsening. Patient denies urinary incontinence, bowel incontinence, significant weight loss, significant motor weakness, and loss of sensations.    Original PAIN SCORES:  Best: Pain is 3  Worst: Pain is 10  Usually: Pain is 5  Current: Pain is 3    INTERVAL HISTORY:   Interval History 5/20/24: Herman Chirinos returns for follow up. At our last visit we met him and scheduled him for a left knee RFA. This was done on 6/6/23. He reports that he got 70% relief after that procedure. However, he presents back today, 11 months later noting that he is starting to limp again and he feels like the RFA is wearing off. The pain is in both medial and lateral joint lines, worse with pushing, lifting, long walking, improved with rest and massage. When it is really hurting him it will get up to a 6/10 and makes him limp. This is preventing him from maintaining his active lifestyle.     Interval History 7/2/2024:  Herman Chirinos returns to clinic s/p Left Genicular cooled RFA.  He reports 80% relief. He is able to walk and standing longer without pain. He is overall very happy with the procedure. He continues HEP for left knee bi-weekly. The patient denies fever/night sweats, urinary incontinence, bowel  incontinence, significant weight changes, significant motor weakness or changes, or loss of sensations. His pain today is 1/10.     6 weeks of Conservative therapy (PT/Chiro/Home Exercises with Dates)  PT - March 2022 - helped ROM and strength     Treatments / Medications: (Ice/Heat/NSAIDS/APAP/etc):  Aleve - occasionally  Knee Sleeve      Report:  Consistent and Appropriate with report.    Interventional Pain Procedures: (Previous injections)  2 CSI in Left Knee over the past few years with decreasing efficacy.   6/6/23: Left Knee Genicular RFA 70% relief for 11 months  6/11/2024 - Left Genicular RFA 70% relief    Past Medical History:   Diagnosis Date    Appendicitis     Benign neoplasm of colon     Cataract     Chronic rhinitis     Colon polyps     Glaucoma     Hyperlipidemia     IFG (impaired fasting glucose) 1/8/2016    Lumbar stenosis     Osteoarthritis of both knees 5/24/2018    Tubular adenoma of colon: 4/16 repeat 2021 4/7/2017     Past Surgical History:   Procedure Laterality Date    APPENDECTOMY      CATARACT EXTRACTION W/  INTRAOCULAR LENS IMPLANT Right 10/3/2023    Procedure: EXTRACTION, CATARACT, WITH IOL INSERTION;  Surgeon: Riley Dougherty MD;  Location: [facility];  Service: Ophthalmology;  Laterality: Right;    COLONOSCOPY  2007    repeat recommended in five years    COLONOSCOPY N/A 4/7/2016    Procedure: COLONOSCOPY;  Surgeon: Luis Yusuf MD;  [facility]  Service: Endoscopy;  Laterality: N/A;  last procedure with Maciel, patient requesting very early appt    COLONOSCOPY N/A 7/8/2021    Procedure: COLONOSCOPY;  Surgeon: Austin Pettit MD;  Location: [facility] ENDO ([facility] FLR);  Service: Endoscopy;  Laterality: N/A;  Fully vacc Covid-19 - pg    RADIOFREQUENCY ABLATION Left 6/6/2023    Procedure: RADIOFREQUENCY ABLATION, LEFT GENICULAR COOLED;  Surgeon: Debby Javier MD;  Location: [facility] PAIN MGT;  Service: Pain Management;  Laterality: Left;    RADIOFREQUENCY  ABLATION Left 6/11/2024    Procedure: RADIOFREQUENCY ABLATION LEFT GENICULAR *ASPIRIN OTC* HOLD FOR 5 DAYS;  Surgeon: Debby Javier MD;  Location: [facility];  Service: Pain Management;  Laterality: Left;  [phone]  2 WK F/U JOCELIN     Social History     Socioeconomic History    Marital status:     Number of children: 6   Occupational History    Occupation: [employer]     Employer: [employer]     Comment: [employer]   Tobacco Use    Smoking status: Never    Smokeless tobacco: Never   Substance and Sexual Activity    Alcohol use: No    Drug use: No    Sexual activity: Yes     Partners: Female   Social History Narrative     2020, lives alone. 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 Has a Mosque life. Active with yard work, walks. Prev work as  at [employer].     Social Determinants of Health     Financial Resource Strain: Low Risk  (2/12/2024)    Overall Financial Resource Strain (CARDIA)     Difficulty of Paying Living Expenses: Not hard at all   Food Insecurity: No Food Insecurity (2/12/2024)    Hunger Vital Sign     Worried About Running Out of Food in the Last Year: Never true     Ran Out of Food in the Last Year: Never true   Transportation Needs: No Transportation Needs (2/12/2024)    PRAPARE - Transportation     Lack of Transportation (Medical): No     Lack of Transportation (Non-Medical): No   Physical Activity: Inactive (2/12/2024)    Exercise Vital Sign     Days of Exercise per Week: 0 days     Minutes of Exercise per Session: 20 min   Stress: Stress Concern Present (2/12/2024)    [place] [employer] - Occupational Stress Questionnaire     Feeling of Stress : To some extent   Housing Stability: Low Risk  (2/12/2024)    Housing Stability Vital Sign     Unable to Pay for Housing in the Last Year: No     Number of Places Lived in the Last Year: 1     Unstable Housing in the Last Year: No     Family History   Problem Relation Name Age of Onset    Coronary  artery disease Mother      Hypertension Mother      Heart disease Mother          heart transplant    Diabetes Mellitus Father      Diabetes Father      Kidney disease Sister      No Known Problems Sister      Kidney disease Brother      Cancer Brother          bladder ca    No Known Problems Brother      Cancer Daughter          br ca, CR    No Known Problems Daughter      No Known Problems Daughter      No Known Problems Son      No Known Problems Son      No Known Problems Son      No Known Problems Maternal Aunt      No Known Problems Maternal Uncle      No Known Problems Paternal Aunt      No Known Problems Paternal Uncle      No Known Problems Maternal Grandmother      No Known Problems Maternal Grandfather      No Known Problems Paternal Grandmother      No Known Problems Paternal Grandfather      Stroke Neg Hx      Glaucoma Neg Hx      Cataracts Neg Hx      Blindness Neg Hx      Amblyopia Neg Hx      Macular degeneration Neg Hx      Retinal detachment Neg Hx      Strabismus Neg Hx      Thyroid disease Neg Hx      Psoriasis Neg Hx      Tremor Neg Hx      Parkinsonism Neg Hx         Review of patient's allergies indicates:  No Known Allergies    Current Outpatient Medications   Medication Sig    aspirin 81 MG Chew Take 1 tablet (81 mg total) by mouth once daily.    latanoprost 0.005 % ophthalmic solution INSTILL 1 DROP IN BOTH EYES EVERY EVENING    loratadine (CLARITIN) 10 mg tablet Take 1 tablet (10 mg total) by mouth once daily.    pravastatin (PRAVACHOL) 80 MG tablet TAKE 1 TABLET(80 MG) BY MOUTH EVERY DAY    tadalafiL (CIALIS) 20 MG Tab Take 1 tablet (20 mg total) by mouth daily as needed. Take 1 hour before intercourse    tamsulosin (FLOMAX) 0.4 mg Cap TAKE 1 CAPSULE(0.4 MG) BY MOUTH EVERY DAY    cholecalciferol, vitamin D3, (VITAMIN D3) 25 mcg (1,000 unit) capsule Take 1 capsule (1,000 Units total) by mouth once daily. (Patient not taking: Reported on 2/22/2024)     No current facility-administered  medications for this visit.       ROS:  GENERAL: No fever. No chills. No fatigue. Denies weight loss. Denies weight gain.  HEENT: Denies headaches. Denies vision change. Denies eye pain. Denies double vision. Denies ear pain.   CV: Denies chest pain.   PULM: Denies of shortness of breath.  GI: Denies constipation. No diarrhea. No abdominal pain. Denies nausea. Denies vomiting. No blood in stool.  HEME: Denies bleeding problems.  : Denies urgency. No painful urination. No blood in urine.  MS: B/l Knee Pain.  SKIN: Denies rash.   NEURO: Denies seizures. No weakness.  PSYCH:  Denies difficulty sleeping. No anxiety. Denies depression. No suicidal thoughts.       VITALS:   Vitals:    07/02/24 1343 07/02/24 1345   BP: (!) 89/63    Pulse: 88    Temp: 97.6 °F (36.4 °C)    Weight: 90.2 kg (198 lb 13.7 oz)    PainSc:   1   1   PainLoc: Knee          PHYSICAL EXAM:   GENERAL: Well appearing, in no acute distress, alert and oriented x3.  PSYCH:  Mood and affect appropriate.  SKIN: Skin color, texture, turgor normal, no rashes or lesions.  HEENT:  Normocephalic, atraumatic. Cranial nerves grossly intact.  PULM: No evidence of respiratory difficulty, symmetric chest rise.  GI:  Non-distended  BACK: Normal range of motion.  EXTREMITIES: No edema.   MUSCULOSKELETAL: Left Knee: Full extension, but flexion only to 90 with pain. No instability. No pain to palpation along medial and lateral joint lines. No laxity or instability noted bilaterally. Hip provocative maneuvers are negative. Bilateral lower extremity strength is normal and symmetric. No atrophy is noted.  NEURO: Sensation is equal and appropriate bilaterally. Bilateral lower extremity coordination and muscle stretch reflexes are physiologic and symmetric. Plantar response are downgoing.   GAIT: Antalgic     LABS:  Reviewed in Epic:  Recent A1c 5.7  GFR >60   H/H wnl.    IMAGING:    Bilateral Knee Xrs 10/18/22  Bilateral tricompartmental osteoarthritis with severe medial  joint space narrowing L>R.       ASSESSMENT: 73 y.o. year old male with bilateral knee pain, consistent with primary osteoarthritis of the knees.    DISCUSSION: Mr. Sutton is a pleasant gentleman who is referred by Dr. Bowles for left knee pain. His long-term plan is to have the knee replaced. He would like to increase his strength and ROM prior to surgery but has been unable to fully participate with PT due to his pain.    PLAN:   Previous imaging was reviewed and discussed with the patient today.   He is s/p Left Genicular RFA with 80% relief  He defers re-referral to Ortho at this time (Jm)  RTC as needed.      Juliana Casillas NP  07/02/2024

## 2024-08-07 DIAGNOSIS — N40.1 BPH WITH URINARY OBSTRUCTION: ICD-10-CM

## 2024-08-07 DIAGNOSIS — N13.8 BPH WITH URINARY OBSTRUCTION: ICD-10-CM

## 2024-08-07 RX ORDER — TAMSULOSIN HYDROCHLORIDE 0.4 MG/1
0.4 CAPSULE ORAL DAILY
Qty: 90 CAPSULE | Refills: 1 | Status: SHIPPED | OUTPATIENT
Start: 2024-08-07

## 2024-08-27 ENCOUNTER — OFFICE VISIT (OUTPATIENT)
Dept: OTOLARYNGOLOGY | Facility: CLINIC | Age: 74
End: 2024-08-27
Payer: MEDICARE

## 2024-08-27 DIAGNOSIS — H61.21 IMPACTED CERUMEN OF RIGHT EAR: ICD-10-CM

## 2024-08-27 DIAGNOSIS — H66.91 RIGHT OTITIS MEDIA, UNSPECIFIED OTITIS MEDIA TYPE: Primary | ICD-10-CM

## 2024-08-27 DIAGNOSIS — H60.311 ACUTE DIFFUSE OTITIS EXTERNA OF RIGHT EAR: ICD-10-CM

## 2024-08-27 PROCEDURE — 3044F HG A1C LEVEL LT 7.0%: CPT | Mod: CPTII,S$GLB,, | Performed by: NURSE PRACTITIONER

## 2024-08-27 PROCEDURE — 1159F MED LIST DOCD IN RCRD: CPT | Mod: CPTII,S$GLB,, | Performed by: NURSE PRACTITIONER

## 2024-08-27 PROCEDURE — 99999 PR PBB SHADOW E&M-EST. PATIENT-LVL III: CPT | Mod: PBBFAC,,, | Performed by: NURSE PRACTITIONER

## 2024-08-27 PROCEDURE — 99204 OFFICE O/P NEW MOD 45 MIN: CPT | Mod: 25,S$GLB,, | Performed by: NURSE PRACTITIONER

## 2024-08-27 PROCEDURE — 1126F AMNT PAIN NOTED NONE PRSNT: CPT | Mod: CPTII,S$GLB,, | Performed by: NURSE PRACTITIONER

## 2024-08-27 RX ORDER — CIPROFLOXACIN AND DEXAMETHASONE 3; 1 MG/ML; MG/ML
4 SUSPENSION/ DROPS AURICULAR (OTIC) 2 TIMES DAILY
Qty: 7.5 ML | Refills: 0 | Status: SHIPPED | OUTPATIENT
Start: 2024-08-27

## 2024-08-27 RX ORDER — AMOXICILLIN 500 MG/1
500 TABLET, FILM COATED ORAL 2 TIMES DAILY
Qty: 20 TABLET | Refills: 0 | Status: SHIPPED | OUTPATIENT
Start: 2024-08-27 | End: 2024-09-06

## 2024-08-27 NOTE — PROGRESS NOTES
Subjective:   Herman Chirinos is a 74 y.o. male who was self-referred for ear pain. He has a past medical history of Appendicitis, Benign neoplasm of colon, Cataract, Chronic rhinitis, Colon polyps, Glaucoma, Hyperlipidemia, IFG (impaired fasting glucose) (1/8/2016), Lumbar stenosis, Osteoarthritis of both knees (5/24/2018), and Tubular adenoma of colon: 4/16 repeat 2021 (4/7/2017). He reports about 5 days of right ear pain that started after getting water stuck in his ear. There is associated otalgia, otorrhea, clogged sensation and muffled hearing. Denies any right ear symptoms. Applied peroxide in the left ear, but did not feel this improved his symptoms. There is not a family history of hearing loss at a young age. There is not a prior history of ear surgery. There is not a prior history of ear infections. There is not a history of ear trauma. He reports a history of significant noise exposure-worked at [employer], but wore HP.     Past Medical History  He has a past medical history of Appendicitis, Benign neoplasm of colon, Cataract, Chronic rhinitis, Colon polyps, Glaucoma, Hyperlipidemia, IFG (impaired fasting glucose), Lumbar stenosis, Osteoarthritis of both knees, and Tubular adenoma of colon: 4/16 repeat 2021.    Past Surgical History  He has a past surgical history that includes Appendectomy; Colonoscopy (2007); Colonoscopy (N/A, 4/7/2016); Colonoscopy (N/A, 7/8/2021); Radiofrequency ablation (Left, 6/6/2023); Cataract extraction w/  intraocular lens implant (Right, 10/3/2023); and Radiofrequency ablation (Left, 6/11/2024).    Family History  His family history includes Cancer in his brother and daughter; Coronary artery disease in his mother; Diabetes in his father; Diabetes Mellitus in his father; Heart disease in his mother; Hypertension in his mother; Kidney disease in his brother and sister; No Known Problems in his brother, daughter, daughter, maternal aunt, maternal grandfather, maternal grandmother,  maternal uncle, paternal aunt, paternal grandfather, paternal grandmother, paternal uncle, sister, son, son, and son.    Social History  He reports that he has never smoked. He has never used smokeless tobacco. He reports that he does not drink alcohol and does not use drugs.    Allergies  He has No Known Allergies.    Medications  He has a current medication list which includes the following prescription(s): amoxicillin, aspirin, cholecalciferol (vitamin d3), ciprofloxacin-dexamethasone 0.3-0.1%, latanoprost, loratadine, pravastatin, tadalafil, and tamsulosin.  Review of Systems     Constitutional: Negative for chills and fever.      HENT: Positive for ear discharge, ear infection, ear pain and hearing loss.  Negative for ringing in the ears.      Neurological: Negative for dizziness, headaches and light-headedness.          Objective:     Constitutional:   He is oriented to person, place, and time. He appears well-developed and well-nourished. He appears alert. He is cooperative.  Non-toxic appearance. He does not have a sickly appearance. He does not appear ill. Normal speech.      Head:  Normocephalic and atraumatic. Not macrocephalic and not microcephalic. Head is without abrasion, without right periorbital erythema, without left periorbital erythema and without TMJ tenderness.     Ears:    Right Ear: There is drainage, swelling and tenderness. No mastoid tenderness. Tympanic membrane is erythematous and bulging. Tympanic membrane is not scarred, not perforated and not retracted. A middle ear effusion is present.   Left Ear: No drainage, swelling or tenderness. No mastoid tenderness. Tympanic membrane is not scarred, not perforated, not erythematous, not retracted and not bulging.  No middle ear effusion.   Ears:      Pulmonary/Chest:   Effort normal.     Psychiatric:   He has a normal mood and affect. His speech is normal and behavior is normal.     Neurological:   He is alert and oriented to person, place,  and time.     Procedure  Cerumen removal performed.  See procedure note.  Procedure Note:  The patient was brought to the minor procedure room and placed under the operating microscope of the right ear canal which was cleaned of ceruminous and purulent debris. Using a combination of suction, curettes and cup forceps the patient's cerumen was removed. The patient tolerated the procedure well. There were no complications.    Assessment:     1. Right otitis media, unspecified otitis media type    2. Acute diffuse otitis externa of right ear    3. Impacted cerumen of right ear      Plan:     Right otitis media, unspecified otitis media type  -     amoxicillin (AMOXIL) 500 MG Tab; Take 1 tablet (500 mg total) by mouth 2 (two) times daily. for 10 days    Acute diffuse otitis externa of right ear  Right ear exam with drainage, swelling, tenderness, and erythema. Cannot fully visualize TM due to inflammation so cannot rule out perforation- would like to use CIPRODEX.  Dry ear precautions discussed/no instrumentation in the ear. Follow-up in 2 weeks.  -     ciprofloxacin-dexAMETHasone 0.3-0.1% (CIPRODEX) 0.3-0.1 % DrpS; Place 4 drops into the right ear 2 (two) times daily.    Impacted cerumen of right ear  Otorrhea and ceruminous debris cleared from the ear.     Follow-up in 2-3 weeks for an ear check.

## 2024-09-30 ENCOUNTER — OFFICE VISIT (OUTPATIENT)
Dept: OTOLARYNGOLOGY | Facility: CLINIC | Age: 74
End: 2024-09-30
Payer: MEDICARE

## 2024-09-30 DIAGNOSIS — H60.311 ACUTE DIFFUSE OTITIS EXTERNA OF RIGHT EAR: ICD-10-CM

## 2024-09-30 DIAGNOSIS — H66.91 RIGHT OTITIS MEDIA, UNSPECIFIED OTITIS MEDIA TYPE: Primary | ICD-10-CM

## 2024-09-30 PROCEDURE — 3288F FALL RISK ASSESSMENT DOCD: CPT | Mod: CPTII,S$GLB,, | Performed by: NURSE PRACTITIONER

## 2024-09-30 PROCEDURE — 3044F HG A1C LEVEL LT 7.0%: CPT | Mod: CPTII,S$GLB,, | Performed by: NURSE PRACTITIONER

## 2024-09-30 PROCEDURE — 99999 PR PBB SHADOW E&M-EST. PATIENT-LVL II: CPT | Mod: PBBFAC,,, | Performed by: NURSE PRACTITIONER

## 2024-09-30 PROCEDURE — 1101F PT FALLS ASSESS-DOCD LE1/YR: CPT | Mod: CPTII,S$GLB,, | Performed by: NURSE PRACTITIONER

## 2024-09-30 PROCEDURE — 1159F MED LIST DOCD IN RCRD: CPT | Mod: CPTII,S$GLB,, | Performed by: NURSE PRACTITIONER

## 2024-09-30 PROCEDURE — 99213 OFFICE O/P EST LOW 20 MIN: CPT | Mod: S$GLB,,, | Performed by: NURSE PRACTITIONER

## 2024-09-30 PROCEDURE — 1126F AMNT PAIN NOTED NONE PRSNT: CPT | Mod: CPTII,S$GLB,, | Performed by: NURSE PRACTITIONER

## 2025-02-03 ENCOUNTER — OFFICE VISIT (OUTPATIENT)
Dept: INTERNAL MEDICINE | Facility: CLINIC | Age: 75
End: 2025-02-03
Payer: MEDICARE

## 2025-02-03 ENCOUNTER — LAB VISIT (OUTPATIENT)
Dept: LAB | Facility: HOSPITAL | Age: 75
End: 2025-02-03
Attending: INTERNAL MEDICINE
Payer: MEDICARE

## 2025-02-03 ENCOUNTER — IMMUNIZATION (OUTPATIENT)
Dept: INTERNAL MEDICINE | Facility: CLINIC | Age: 75
End: 2025-02-03
Payer: MEDICARE

## 2025-02-03 VITALS
WEIGHT: 198.88 LBS | HEIGHT: 72 IN | HEART RATE: 92 BPM | DIASTOLIC BLOOD PRESSURE: 68 MMHG | BODY MASS INDEX: 26.94 KG/M2 | SYSTOLIC BLOOD PRESSURE: 102 MMHG | OXYGEN SATURATION: 97 %

## 2025-02-03 DIAGNOSIS — Z23 NEED FOR VACCINATION: Primary | ICD-10-CM

## 2025-02-03 DIAGNOSIS — N13.8 BPH WITH URINARY OBSTRUCTION: ICD-10-CM

## 2025-02-03 DIAGNOSIS — N40.1 BPH WITH URINARY OBSTRUCTION: ICD-10-CM

## 2025-02-03 DIAGNOSIS — E78.2 MIXED HYPERLIPIDEMIA: Primary | ICD-10-CM

## 2025-02-03 DIAGNOSIS — E78.2 MIXED HYPERLIPIDEMIA: ICD-10-CM

## 2025-02-03 DIAGNOSIS — R73.03 PREDIABETES: ICD-10-CM

## 2025-02-03 DIAGNOSIS — M54.12 CERVICAL RADICULOPATHY: ICD-10-CM

## 2025-02-03 LAB
ALBUMIN SERPL BCP-MCNC: 3.8 G/DL (ref 3.5–5.2)
ALP SERPL-CCNC: 50 U/L (ref 40–150)
ALT SERPL W/O P-5'-P-CCNC: 18 U/L (ref 10–44)
ANION GAP SERPL CALC-SCNC: 8 MMOL/L (ref 8–16)
AST SERPL-CCNC: 31 U/L (ref 10–40)
BASOPHILS # BLD AUTO: 0.06 K/UL (ref 0–0.2)
BASOPHILS NFR BLD: 0.9 % (ref 0–1.9)
BILIRUB SERPL-MCNC: 0.5 MG/DL (ref 0.1–1)
BUN SERPL-MCNC: 19 MG/DL (ref 8–23)
CALCIUM SERPL-MCNC: 9.3 MG/DL (ref 8.7–10.5)
CHLORIDE SERPL-SCNC: 106 MMOL/L (ref 95–110)
CHOLEST SERPL-MCNC: 159 MG/DL (ref 120–199)
CHOLEST/HDLC SERPL: 2.9 {RATIO} (ref 2–5)
CO2 SERPL-SCNC: 27 MMOL/L (ref 23–29)
CREAT SERPL-MCNC: 0.8 MG/DL (ref 0.5–1.4)
DIFFERENTIAL METHOD BLD: ABNORMAL
EOSINOPHIL # BLD AUTO: 0.1 K/UL (ref 0–0.5)
EOSINOPHIL NFR BLD: 1.1 % (ref 0–8)
ERYTHROCYTE [DISTWIDTH] IN BLOOD BY AUTOMATED COUNT: 15.1 % (ref 11.5–14.5)
EST. GFR  (NO RACE VARIABLE): >60 ML/MIN/1.73 M^2
ESTIMATED AVG GLUCOSE: 114 MG/DL (ref 68–131)
GLUCOSE SERPL-MCNC: 95 MG/DL (ref 70–110)
HBA1C MFR BLD: 5.6 % (ref 4–5.6)
HCT VFR BLD AUTO: 43.5 % (ref 40–54)
HDLC SERPL-MCNC: 54 MG/DL (ref 40–75)
HDLC SERPL: 34 % (ref 20–50)
HGB BLD-MCNC: 14.3 G/DL (ref 14–18)
IMM GRANULOCYTES # BLD AUTO: 0.02 K/UL (ref 0–0.04)
IMM GRANULOCYTES NFR BLD AUTO: 0.3 % (ref 0–0.5)
LDLC SERPL CALC-MCNC: 86.4 MG/DL (ref 63–159)
LYMPHOCYTES # BLD AUTO: 1.7 K/UL (ref 1–4.8)
LYMPHOCYTES NFR BLD: 24.4 % (ref 18–48)
MCH RBC QN AUTO: 30.2 PG (ref 27–31)
MCHC RBC AUTO-ENTMCNC: 32.9 G/DL (ref 32–36)
MCV RBC AUTO: 92 FL (ref 82–98)
MONOCYTES # BLD AUTO: 0.7 K/UL (ref 0.3–1)
MONOCYTES NFR BLD: 10.5 % (ref 4–15)
NEUTROPHILS # BLD AUTO: 4.4 K/UL (ref 1.8–7.7)
NEUTROPHILS NFR BLD: 62.8 % (ref 38–73)
NONHDLC SERPL-MCNC: 105 MG/DL
NRBC BLD-RTO: 0 /100 WBC
PLATELET # BLD AUTO: 270 K/UL (ref 150–450)
PMV BLD AUTO: 9.9 FL (ref 9.2–12.9)
POTASSIUM SERPL-SCNC: 4 MMOL/L (ref 3.5–5.1)
PROT SERPL-MCNC: 7.4 G/DL (ref 6–8.4)
RBC # BLD AUTO: 4.74 M/UL (ref 4.6–6.2)
SODIUM SERPL-SCNC: 141 MMOL/L (ref 136–145)
TRIGL SERPL-MCNC: 93 MG/DL (ref 30–150)
WBC # BLD AUTO: 7.04 K/UL (ref 3.9–12.7)

## 2025-02-03 PROCEDURE — 1159F MED LIST DOCD IN RCRD: CPT | Mod: CPTII,S$GLB,, | Performed by: INTERNAL MEDICINE

## 2025-02-03 PROCEDURE — G0008 ADMIN INFLUENZA VIRUS VAC: HCPCS | Mod: S$GLB,,, | Performed by: INTERNAL MEDICINE

## 2025-02-03 PROCEDURE — 3044F HG A1C LEVEL LT 7.0%: CPT | Mod: CPTII,S$GLB,, | Performed by: INTERNAL MEDICINE

## 2025-02-03 PROCEDURE — 83036 HEMOGLOBIN GLYCOSYLATED A1C: CPT | Performed by: INTERNAL MEDICINE

## 2025-02-03 PROCEDURE — G2211 COMPLEX E/M VISIT ADD ON: HCPCS | Mod: S$GLB,,, | Performed by: INTERNAL MEDICINE

## 2025-02-03 PROCEDURE — 1101F PT FALLS ASSESS-DOCD LE1/YR: CPT | Mod: CPTII,S$GLB,, | Performed by: INTERNAL MEDICINE

## 2025-02-03 PROCEDURE — 3074F SYST BP LT 130 MM HG: CPT | Mod: CPTII,S$GLB,, | Performed by: INTERNAL MEDICINE

## 2025-02-03 PROCEDURE — 99214 OFFICE O/P EST MOD 30 MIN: CPT | Mod: S$GLB,,, | Performed by: INTERNAL MEDICINE

## 2025-02-03 PROCEDURE — 3288F FALL RISK ASSESSMENT DOCD: CPT | Mod: CPTII,S$GLB,, | Performed by: INTERNAL MEDICINE

## 2025-02-03 PROCEDURE — 1125F AMNT PAIN NOTED PAIN PRSNT: CPT | Mod: CPTII,S$GLB,, | Performed by: INTERNAL MEDICINE

## 2025-02-03 PROCEDURE — 80061 LIPID PANEL: CPT | Performed by: INTERNAL MEDICINE

## 2025-02-03 PROCEDURE — 3078F DIAST BP <80 MM HG: CPT | Mod: CPTII,S$GLB,, | Performed by: INTERNAL MEDICINE

## 2025-02-03 PROCEDURE — 80053 COMPREHEN METABOLIC PANEL: CPT | Performed by: INTERNAL MEDICINE

## 2025-02-03 PROCEDURE — 99999 PR PBB SHADOW E&M-EST. PATIENT-LVL III: CPT | Mod: PBBFAC,,, | Performed by: INTERNAL MEDICINE

## 2025-02-03 PROCEDURE — 90653 IIV ADJUVANT VACCINE IM: CPT | Mod: S$GLB,,, | Performed by: INTERNAL MEDICINE

## 2025-02-03 PROCEDURE — 85025 COMPLETE CBC W/AUTO DIFF WBC: CPT | Performed by: INTERNAL MEDICINE

## 2025-02-03 RX ORDER — SILDENAFIL 100 MG/1
100 TABLET, FILM COATED ORAL DAILY PRN
Qty: 10 TABLET | Refills: 11 | Status: SHIPPED | OUTPATIENT
Start: 2025-02-03 | End: 2025-02-24 | Stop reason: SDUPTHER

## 2025-02-03 RX ORDER — TAMSULOSIN HYDROCHLORIDE 0.4 MG/1
0.4 CAPSULE ORAL DAILY
Qty: 90 CAPSULE | Refills: 11 | Status: SHIPPED | OUTPATIENT
Start: 2025-02-03 | End: 2025-02-24 | Stop reason: SDUPTHER

## 2025-02-03 RX ORDER — PRAVASTATIN SODIUM 80 MG/1
80 TABLET ORAL DAILY
Qty: 90 TABLET | Refills: 11 | Status: SHIPPED | OUTPATIENT
Start: 2025-02-03

## 2025-02-03 NOTE — PROGRESS NOTES
Subjective     Chief Complaint: Annual f/u, L arm tingling     History of Present Illness:  Mr. Herman Chirinos is a 74 y.o. male w/ BPH, HLD, Prediabetes who presents to the clinic for an annual f/u and L arm tingling onset 2 weeks prior to presentation. He says this is non-painful, paraesthesia that begins in his L shoulder down his arm that does not go into his hand. The sensation is only felt when the patient is not distracted and thinking about it, it briefly lasts and because of this he has not tried medication for this. Denies weakness, dropping items from his hands, falls, vision changes, chest pain, SOB, NOEL.     Patient has a history of BPH for which he takes Flomax. Flow is moderate, with hesitancy but no frequency or urge incontinence. He continues to have nocturia x 1 time nightly, denies dysuria, hematuria.     Review of Systems   Constitutional:  Negative for chills and fever.   Respiratory:  Negative for cough.    Cardiovascular:  Negative for chest pain and leg swelling.   Genitourinary:  Negative for frequency and hematuria.   All other systems reviewed and are negative.      PAST HISTORY:     Past Medical History:   Diagnosis Date    Appendicitis     Benign neoplasm of colon     Cataract     Chronic rhinitis     Colon polyps     Glaucoma     Hyperlipidemia     IFG (impaired fasting glucose) 1/8/2016    Lumbar stenosis     Osteoarthritis of both knees 5/24/2018    Tubular adenoma of colon: 4/16 repeat 2021 4/7/2017       Past Surgical History:   Procedure Laterality Date    APPENDECTOMY      CATARACT EXTRACTION W/  INTRAOCULAR LENS IMPLANT Right 10/3/2023    Procedure: EXTRACTION, CATARACT, WITH IOL INSERTION;  Surgeon: Riley Dougherty MD;  Location: [facility];  Service: Ophthalmology;  Laterality: Right;    COLONOSCOPY  2007    repeat recommended in five years    COLONOSCOPY N/A 4/7/2016    Procedure: COLONOSCOPY;  Surgeon: Luis Yusuf MD;  Location: [facility] ([address] [place] [address]);   Service: Endoscopy;  Laterality: N/A;  last procedure with Maciel, patient requesting very early appt    COLONOSCOPY N/A 7/8/2021    Procedure: COLONOSCOPY;  Surgeon: Austin Pettit MD;  [facility]  Service: Endoscopy;  Laterality: N/A;  Fully vacc Covid-19 - pg    RADIOFREQUENCY ABLATION Left 6/6/2023    Procedure: RADIOFREQUENCY ABLATION, LEFT GENICULAR COOLED;  Surgeon: Debby Javier MD;  Location: [facility];  Service: Pain Management;  Laterality: Left;    RADIOFREQUENCY ABLATION Left 6/11/2024    Procedure: RADIOFREQUENCY ABLATION LEFT GENICULAR *ASPIRIN OTC* HOLD FOR 5 DAYS;  Surgeon: Debby Javier MD;  Location: [facility];  Service: Pain Management;  Laterality: Left;  [phone]  2 WK F/U JOCELIN       Family History   Problem Relation Name Age of Onset    Coronary artery disease Mother      Hypertension Mother      Heart disease Mother          heart transplant    Diabetes Mellitus Father      Diabetes Father      Kidney disease Sister      No Known Problems Sister      Kidney disease Brother      Cancer Brother          bladder ca    No Known Problems Brother      Cancer Daughter          br ca, CR    No Known Problems Daughter      No Known Problems Daughter      No Known Problems Son      No Known Problems Son      No Known Problems Son      No Known Problems Maternal Aunt      No Known Problems Maternal Uncle      No Known Problems Paternal Aunt      No Known Problems Paternal Uncle      No Known Problems Maternal Grandmother      No Known Problems Maternal Grandfather      No Known Problems Paternal Grandmother      No Known Problems Paternal Grandfather      Stroke Neg Hx      Glaucoma Neg Hx      Cataracts Neg Hx      Blindness Neg Hx      Amblyopia Neg Hx      Macular degeneration Neg Hx      Retinal detachment Neg Hx      Strabismus Neg Hx      Thyroid disease Neg Hx      Psoriasis Neg Hx      Tremor Neg Hx      Parkinsonism Neg Hx         Social History      Tobacco Use    Smoking status: Never    Smokeless tobacco: Never   Substance Use Topics    Alcohol use: No    Drug use: No       MEDICATIONS & ALLERGIES:     Current Outpatient Medications on File Prior to Visit   Medication Sig    aspirin 81 MG Chew Take 1 tablet (81 mg total) by mouth once daily.    cholecalciferol, vitamin D3, (VITAMIN D3) 25 mcg (1,000 unit) capsule Take 1 capsule (1,000 Units total) by mouth once daily.    ciprofloxacin-dexAMETHasone 0.3-0.1% (CIPRODEX) 0.3-0.1 % DrpS Place 4 drops into the right ear 2 (two) times daily.    latanoprost 0.005 % ophthalmic solution INSTILL 1 DROP IN BOTH EYES EVERY EVENING    loratadine (CLARITIN) 10 mg tablet Take 1 tablet (10 mg total) by mouth once daily.    [DISCONTINUED] pravastatin (PRAVACHOL) 80 MG tablet TAKE 1 TABLET(80 MG) BY MOUTH EVERY DAY    [DISCONTINUED] sildenafiL (VIAGRA) 100 MG tablet Take 1 tablet (100 mg total) by mouth daily as needed for Erectile Dysfunction.    [DISCONTINUED] tamsulosin (FLOMAX) 0.4 mg Cap Take 1 capsule (0.4 mg total) by mouth once daily.     No current facility-administered medications on file prior to visit.       Review of patient's allergies indicates:  No Known Allergies    OBJECTIVE:     Vital Signs:  Vitals:    02/03/25 1459   BP: 102/68   BP Location: Right arm   Patient Position: Sitting   Pulse: 92   SpO2: 97%   Weight: 90.2 kg (198 lb 13.7 oz)   Height: 6' (1.829 m)       Body mass index is 26.97 kg/m².     Physical Exam  Vitals and nursing note reviewed.   Constitutional:       Appearance: Normal appearance.   HENT:      Head: Normocephalic and atraumatic.   Eyes:      General: No scleral icterus.  Cardiovascular:      Rate and Rhythm: Normal rate and regular rhythm.      Heart sounds: No murmur heard.     No friction rub. No gallop.   Pulmonary:      Breath sounds: No wheezing, rhonchi or rales.   Abdominal:      General: Bowel sounds are normal.      Palpations: Abdomen is soft.      Tenderness: There is  no abdominal tenderness. There is no guarding or rebound.   Musculoskeletal:         General: No deformity or signs of injury. Normal range of motion.      Right lower leg: No edema.   Skin:     General: Skin is warm.      Capillary Refill: Capillary refill takes 2 to 3 seconds.   Neurological:      Mental Status: He is alert and oriented to person, place, and time.      Sensory: No sensory deficit.      Motor: No weakness.      Coordination: Coordination normal.         Health Maintenance Due   Topic Date Due    RSV Vaccine (Age 60+ and Pregnant patients) (1 - 1-dose 75+ series) Never done    COVID-19 Vaccine (8 - 2024-25 season) 09/01/2024    Hemoglobin A1c (Prediabetes)  02/05/2025    Lipid Panel  02/05/2025         ASSESSMENT & PLAN:   Mr. Herman Chirinos is a 74 y.o. male w/ BPH, HLD, Prediabetes who presents to the clinic for an annual f/u and L arm tingling onset 2 weeks prior to presentation.     Mixed hyperlipidemia  -     Lipid Panel; Future; Expected date: 02/03/2025  -     pravastatin (PRAVACHOL) 80 MG tablet; Take 1 tablet (80 mg total) by mouth once daily.  Dispense: 90 tablet; Refill: 11    BPH with urinary obstruction  -     tamsulosin (FLOMAX) 0.4 mg Cap; Take 1 capsule (0.4 mg total) by mouth once daily.  Dispense: 90 capsule; Refill: 11    Prediabetes  -     CBC Auto Differential; Future; Expected date: 02/03/2025  -     Comprehensive Metabolic Panel; Future; Expected date: 02/03/2025  -     Hemoglobin A1C; Future; Expected date: 02/03/2025    Cervical radiculopathy    Other orders  -     sildenafiL (VIAGRA) 100 MG tablet; Take 1 tablet (100 mg total) by mouth daily as needed for Erectile Dysfunction.  Dispense: 10 tablet; Refill: 11    Patient with known hx of cervical radiculopathy and stenosis, this may be contributing to his L arm paraesthesia. Low suspicion for stroke/ACS as he does not have weakness/chest discomfort. Offered spine referral, he declines for now but will call if symptoms  worsen.      RTC in 1 year    Discussed with Dr. Toledo - staff attestation to follow      Alisa Dale DO   Internal Medicine, PGY-1  [facility]  [address] [place] [address]  [phone]    I have reviewed and concur with the resident's history, physical, assessment, and plan.  I have personally interviewed and examined the patient at bedside.  See below addendum for my evaluation and additional findings.    Health Maintenance         Date Due Completion Date    COVID-19 Vaccine (8 - 2024-25 season) 09/01/2024 1/8/2024    Hemoglobin A1c (Prediabetes) 02/03/2026 2/3/2025    Lipid Panel 02/03/2026 2/3/2025    Colorectal Cancer Screening 07/08/2026 7/8/2021    Override on 7/27/2007: Done    TETANUS VACCINE 05/24/2028 5/24/2018   Flu and RSV vaccines please.       Follow up in about 1 year (around 2/3/2026).    Visit today included increased complexity associated with the care of the episodic problem  addressed and managing the longitudinal care of the patient due to the serious and/or complex managed problem(s) .        Future Appointments   Date Time Provider Department Center   2/24/2025 10:40 AM Ana Cole NP [facility] UROLOGY [facility]   2/3/2026  9:00 AM Avila Toledo MD [facility] IM [facility] PCW

## 2025-02-19 ENCOUNTER — RESULTS FOLLOW-UP (OUTPATIENT)
Dept: INTERNAL MEDICINE | Facility: CLINIC | Age: 75
End: 2025-02-19
Payer: MEDICARE

## 2025-02-24 ENCOUNTER — LAB VISIT (OUTPATIENT)
Dept: LAB | Facility: HOSPITAL | Age: 75
End: 2025-02-24
Payer: MEDICARE

## 2025-02-24 ENCOUNTER — OFFICE VISIT (OUTPATIENT)
Dept: UROLOGY | Facility: CLINIC | Age: 75
End: 2025-02-24
Payer: MEDICARE

## 2025-02-24 VITALS
WEIGHT: 201.75 LBS | DIASTOLIC BLOOD PRESSURE: 67 MMHG | HEART RATE: 90 BPM | HEIGHT: 72 IN | BODY MASS INDEX: 27.33 KG/M2 | SYSTOLIC BLOOD PRESSURE: 113 MMHG

## 2025-02-24 DIAGNOSIS — N13.8 BPH WITH URINARY OBSTRUCTION: ICD-10-CM

## 2025-02-24 DIAGNOSIS — N13.8 BPH WITH URINARY OBSTRUCTION: Primary | ICD-10-CM

## 2025-02-24 DIAGNOSIS — N52.01 ERECTILE DYSFUNCTION DUE TO ARTERIAL INSUFFICIENCY: ICD-10-CM

## 2025-02-24 DIAGNOSIS — N40.1 BPH WITH URINARY OBSTRUCTION: ICD-10-CM

## 2025-02-24 DIAGNOSIS — N40.1 BPH WITH URINARY OBSTRUCTION: Primary | ICD-10-CM

## 2025-02-24 LAB
BILIRUB SERPL-MCNC: NORMAL MG/DL
BLOOD URINE, POC: NORMAL
CLARITY, POC UA: CLEAR
COLOR, POC UA: YELLOW
COMPLEXED PSA SERPL-MCNC: 4.6 NG/ML (ref 0–4)
GLUCOSE UR QL STRIP: NORMAL
KETONES UR QL STRIP: NORMAL
LEUKOCYTE ESTERASE URINE, POC: NORMAL
NITRITE, POC UA: NORMAL
PH, POC UA: 6.5
POC RESIDUAL URINE VOLUME: 8 ML (ref 0–100)
PROTEIN, POC: NORMAL
SPECIFIC GRAVITY, POC UA: 1.02
UROBILINOGEN, POC UA: 0.2

## 2025-02-24 PROCEDURE — 36415 COLL VENOUS BLD VENIPUNCTURE: CPT

## 2025-02-24 PROCEDURE — 3074F SYST BP LT 130 MM HG: CPT | Mod: CPTII,S$GLB,,

## 2025-02-24 PROCEDURE — 1125F AMNT PAIN NOTED PAIN PRSNT: CPT | Mod: CPTII,S$GLB,,

## 2025-02-24 PROCEDURE — 99214 OFFICE O/P EST MOD 30 MIN: CPT | Mod: 25,S$GLB,,

## 2025-02-24 PROCEDURE — 1160F RVW MEDS BY RX/DR IN RCRD: CPT | Mod: CPTII,S$GLB,,

## 2025-02-24 PROCEDURE — 51798 US URINE CAPACITY MEASURE: CPT | Mod: S$GLB,,,

## 2025-02-24 PROCEDURE — 3078F DIAST BP <80 MM HG: CPT | Mod: CPTII,S$GLB,,

## 2025-02-24 PROCEDURE — 84153 ASSAY OF PSA TOTAL: CPT

## 2025-02-24 PROCEDURE — 81002 URINALYSIS NONAUTO W/O SCOPE: CPT | Mod: S$GLB,,,

## 2025-02-24 PROCEDURE — 1159F MED LIST DOCD IN RCRD: CPT | Mod: CPTII,S$GLB,,

## 2025-02-24 PROCEDURE — 3044F HG A1C LEVEL LT 7.0%: CPT | Mod: CPTII,S$GLB,,

## 2025-02-24 PROCEDURE — 3288F FALL RISK ASSESSMENT DOCD: CPT | Mod: CPTII,S$GLB,,

## 2025-02-24 PROCEDURE — 1101F PT FALLS ASSESS-DOCD LE1/YR: CPT | Mod: CPTII,S$GLB,,

## 2025-02-24 PROCEDURE — 99999 PR PBB SHADOW E&M-EST. PATIENT-LVL III: CPT | Mod: PBBFAC,,,

## 2025-02-24 RX ORDER — TAMSULOSIN HYDROCHLORIDE 0.4 MG/1
0.4 CAPSULE ORAL DAILY
Qty: 90 CAPSULE | Refills: 3 | Status: SHIPPED | OUTPATIENT
Start: 2025-02-24

## 2025-02-24 RX ORDER — SILDENAFIL 100 MG/1
100 TABLET, FILM COATED ORAL DAILY PRN
Qty: 10 TABLET | Refills: 11 | Status: SHIPPED | OUTPATIENT
Start: 2025-02-24

## 2025-02-24 NOTE — PROGRESS NOTES
CHIEF COMPLAINT:  Annual       HISTORY OF PRESENTING ILLINESS:  Herman Chirinos is a 74 y.o. male is an established pt with the urology dept. He was last seen by Dr Dennison 2/2024. He takes Flomax daily. He reports a good urine stream with occasional feelings of incomplete emptying. Nocturia x 0-2. Denies fever, chills, and hematuria. He uses Viagra with good results. He denies a FH of prostate cancer. He would like a PSA today.           REVIEW OF SYSTEMS:  Review of Systems   Constitutional:  Negative for chills and fever.   Genitourinary:  Negative for dysuria, flank pain, frequency, hematuria and urgency.         PATIENT HISTORY:    Past Medical History:   Diagnosis Date    Appendicitis     Benign neoplasm of colon     Cataract     Chronic rhinitis     Colon polyps     Glaucoma     Hyperlipidemia     IFG (impaired fasting glucose) 1/8/2016    Lumbar stenosis     Osteoarthritis of both knees 5/24/2018    Tubular adenoma of colon: 4/16 repeat 2021 4/7/2017       Past Surgical History:   Procedure Laterality Date    APPENDECTOMY      CATARACT EXTRACTION W/  INTRAOCULAR LENS IMPLANT Right 10/3/2023    Procedure: EXTRACTION, CATARACT, WITH IOL INSERTION;  Surgeon: Riley Dougherty MD;  [facility];  Service: Ophthalmology;  Laterality: Right;    COLONOSCOPY  2007    repeat recommended in five years    COLONOSCOPY N/A 4/7/2016    Procedure: COLONOSCOPY;  Surgeon: Luis Yusuf MD;  Location: [facility] ([address] [place] [address]);  Service: Endoscopy;  Laterality: N/A;  last procedure with Maciel, patient requesting very early appt    COLONOSCOPY N/A 7/8/2021    Procedure: COLONOSCOPY;  Surgeon: Austin Pettit MD;  [facility]  Service: Endoscopy;  Laterality: N/A;  Fully vacc Covid-19 - pg    RADIOFREQUENCY ABLATION Left 6/6/2023    Procedure: RADIOFREQUENCY ABLATION, LEFT GENICULAR COOLED;  Surgeon: Debby Javier MD;  Location: [facility];  Service: Pain Management;  Laterality:  Left;    RADIOFREQUENCY ABLATION Left 6/11/2024    Procedure: RADIOFREQUENCY ABLATION LEFT GENICULAR *ASPIRIN OTC* HOLD FOR 5 DAYS;  Surgeon: Debby Javier MD;  Location: [facility];  Service: Pain Management;  Laterality: Left;  [phone]  2 WK F/U JOCELIN       Family History   Problem Relation Name Age of Onset    Coronary artery disease Mother      Hypertension Mother      Heart disease Mother          heart transplant    Diabetes Mellitus Father      Diabetes Father      Kidney disease Sister      No Known Problems Sister      Kidney disease Brother      Cancer Brother          bladder ca    No Known Problems Brother      Cancer Daughter          br ca, CR    No Known Problems Daughter      No Known Problems Daughter      No Known Problems Son      No Known Problems Son      No Known Problems Son      No Known Problems Maternal Aunt      No Known Problems Maternal Uncle      No Known Problems Paternal Aunt      No Known Problems Paternal Uncle      No Known Problems Maternal Grandmother      No Known Problems Maternal Grandfather      No Known Problems Paternal Grandmother      No Known Problems Paternal Grandfather      Stroke Neg Hx      Glaucoma Neg Hx      Cataracts Neg Hx      Blindness Neg Hx      Amblyopia Neg Hx      Macular degeneration Neg Hx      Retinal detachment Neg Hx      Strabismus Neg Hx      Thyroid disease Neg Hx      Psoriasis Neg Hx      Tremor Neg Hx      Parkinsonism Neg Hx         Social History[1]    Allergies:  Patient has no known allergies.    Medications:  Current Medications[2]    PHYSICAL EXAMINATION:  Physical Exam  Vitals reviewed.   HENT:      Head: Normocephalic and atraumatic.   Eyes:      Pupils: Pupils are equal, round, and reactive to light.   Pulmonary:      Effort: Pulmonary effort is normal. No respiratory distress.   Skin:     General: Skin is warm and dry.      Capillary Refill: Capillary refill takes less than 2 seconds.   Neurological:      General: No  focal deficit present.      Mental Status: He is alert.   Psychiatric:         Mood and Affect: Mood normal.         Behavior: Behavior normal.           LABS:      In office UA today was normal. A bladder scan  was done immediately after urination which showed 7 ml.       Lab Results   Component Value Date    PSA 3.3 11/02/2020    PSA 3.3 04/26/2019    PSA 2.5 05/21/2018       Lab Results   Component Value Date    CREATININE 0.8 02/03/2025    EGFRNORACEVR >60.0 02/03/2025               IMPRESSION:    Encounter Diagnoses   Name Primary?    BPH with urinary obstruction Yes    Erectile dysfunction due to arterial insufficiency          Assessment:       1. BPH with urinary obstruction    2. Erectile dysfunction due to arterial insufficiency        Plan:       -Refilled Viagra and Flomax today.  -PSA today       I spent a total of 30 minutes on the day of the visit. This includes face to face time and non-face to face time preparing to see the patient (eg, review of tests), obtaining and/or reviewing separately obtained history, documenting clinical information in the electronic or other health record, independently interpreting results and communicating results to the patient/family/caregiver, or care coordinator  Reviewed the possible contributory factors.  Reassurance with today's in office UA; emptying bladder well.                [1]   Social History  Socioeconomic History    Marital status:     Number of children: 6   Occupational History    Occupation: [employer]     Employer: [employer]     Comment: [employer]   Tobacco Use    Smoking status: Never    Smokeless tobacco: Never   Substance and Sexual Activity    Alcohol use: No    Drug use: No    Sexual activity: Yes     Partners: Female   Social History Narrative     2020, lives alone. 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 Has a Hindu life. Active with yard work, walks. Prev work as  at [employer].     Social Drivers of Health      Financial Resource Strain: Low Risk  (2/12/2024)    Overall Financial Resource Strain (CARDIA)     Difficulty of Paying Living Expenses: Not hard at all   Food Insecurity: No Food Insecurity (2/12/2024)    Hunger Vital Sign     Worried About Running Out of Food in the Last Year: Never true     Ran Out of Food in the Last Year: Never true   Transportation Needs: No Transportation Needs (2/12/2024)    PRAPARE - Transportation     Lack of Transportation (Medical): No     Lack of Transportation (Non-Medical): No   Physical Activity: Inactive (2/12/2024)    Exercise Vital Sign     Days of Exercise per Week: 0 days     Minutes of Exercise per Session: 20 min   Stress: Stress Concern Present (2/12/2024)    [employer] [place] [employer] - Occupational Stress Questionnaire     Feeling of Stress : To some extent   Housing Stability: Low Risk  (2/12/2024)    Housing Stability Vital Sign     Unable to Pay for Housing in the Last Year: No     Number of Places Lived in the Last Year: 1     Unstable Housing in the Last Year: No   [2]   Current Outpatient Medications:     aspirin 81 MG Chew, Take 1 tablet (81 mg total) by mouth once daily., Disp: 30 tablet, Rfl: 12    cholecalciferol, vitamin D3, (VITAMIN D3) 25 mcg (1,000 unit) capsule, Take 1 capsule (1,000 Units total) by mouth once daily., Disp: 30 capsule, Rfl: 0    ciprofloxacin-dexAMETHasone 0.3-0.1% (CIPRODEX) 0.3-0.1 % DrpS, Place 4 drops into the right ear 2 (two) times daily., Disp: 7.5 mL, Rfl: 0    latanoprost 0.005 % ophthalmic solution, INSTILL 1 DROP IN BOTH EYES EVERY EVENING, Disp: 2.5 mL, Rfl: 12    loratadine (CLARITIN) 10 mg tablet, Take 1 tablet (10 mg total) by mouth once daily., Disp: 1 Bottle, Rfl: 0    pravastatin (PRAVACHOL) 80 MG tablet, Take 1 tablet (80 mg total) by mouth once daily., Disp: 90 tablet, Rfl: 11    sildenafiL (VIAGRA) 100 MG tablet, Take 1 tablet (100 mg total) by mouth daily as needed for Erectile Dysfunction., Disp: 10  tablet, Rfl: 11    tamsulosin (FLOMAX) 0.4 mg Cap, Take 1 capsule (0.4 mg total) by mouth once daily., Disp: 90 capsule, Rfl: 3

## 2025-03-02 ENCOUNTER — RESULTS FOLLOW-UP (OUTPATIENT)
Dept: UROLOGY | Facility: CLINIC | Age: 75
End: 2025-03-02

## 2025-03-05 DIAGNOSIS — R97.20 ELEVATED PSA: Primary | ICD-10-CM

## 2025-03-14 ENCOUNTER — LAB VISIT (OUTPATIENT)
Dept: LAB | Facility: HOSPITAL | Age: 75
End: 2025-03-14
Payer: MEDICARE

## 2025-03-14 ENCOUNTER — TELEPHONE (OUTPATIENT)
Dept: UROLOGY | Facility: CLINIC | Age: 75
End: 2025-03-14
Payer: MEDICARE

## 2025-03-14 DIAGNOSIS — R97.20 ELEVATED PSA: ICD-10-CM

## 2025-03-14 DIAGNOSIS — R97.20 ELEVATED PSA: Primary | ICD-10-CM

## 2025-03-14 LAB — COMPLEXED PSA SERPL-MCNC: 3.7 NG/ML (ref 0–4)

## 2025-03-14 PROCEDURE — 86316 IMMUNOASSAY TUMOR OTHER: CPT

## 2025-03-14 PROCEDURE — 84153 ASSAY OF PSA TOTAL: CPT

## 2025-03-14 PROCEDURE — 84153 ASSAY OF PSA TOTAL: CPT | Mod: 91

## 2025-03-14 NOTE — TELEPHONE ENCOUNTER
Informed pt that PSA looks much improved from last reading and that as of now there would be no further testing. Still awaiting PHI results.

## 2025-03-14 NOTE — TELEPHONE ENCOUNTER
Informed pt that insurance did not approve of MRI and that this was escalated to the director of Urology Dr Fox who recommended repeating PSA and obtaining a PHI. Pt would like to proceed with this. Will contact him once results are in.

## 2025-03-15 LAB
-2 PROPSA (PHI): 16.5 PG/ML
FREE PSA (PHI): 1.2 NG/ML
PROSTATE HEALTH INDEX VALUE (PHI): 27.8
PSA FREE MFR SERPL: 29 %
PSA SERPL-MCNC: 4.1 NG/ML

## 2025-03-18 DIAGNOSIS — H40.1131 PRIMARY OPEN ANGLE GLAUCOMA OF BOTH EYES, MILD STAGE: ICD-10-CM

## 2025-03-18 RX ORDER — LATANOPROST 50 UG/ML
1 SOLUTION/ DROPS OPHTHALMIC NIGHTLY
Qty: 2.5 ML | Refills: 12 | Status: SHIPPED | OUTPATIENT
Start: 2025-03-18

## 2025-03-25 ENCOUNTER — PATIENT MESSAGE (OUTPATIENT)
Dept: UROLOGY | Facility: CLINIC | Age: 75
End: 2025-03-25
Payer: MEDICARE

## 2025-04-22 ENCOUNTER — TELEPHONE (OUTPATIENT)
Dept: OPTOMETRY | Facility: CLINIC | Age: 75
End: 2025-04-22
Payer: MEDICARE

## 2025-05-05 DIAGNOSIS — N40.1 BPH WITH URINARY OBSTRUCTION: ICD-10-CM

## 2025-05-05 DIAGNOSIS — N52.01 ERECTILE DYSFUNCTION DUE TO ARTERIAL INSUFFICIENCY: ICD-10-CM

## 2025-05-05 DIAGNOSIS — N13.8 BPH WITH URINARY OBSTRUCTION: ICD-10-CM

## 2025-05-05 RX ORDER — TAMSULOSIN HYDROCHLORIDE 0.4 MG/1
0.4 CAPSULE ORAL
Qty: 90 CAPSULE | Refills: 3 | Status: SHIPPED | OUTPATIENT
Start: 2025-05-05

## 2025-05-05 NOTE — TELEPHONE ENCOUNTER
No care due was identified.  [facility] Embedded Care Due Messages. Reference number: [id number].   5/05/2025 10:16:30 AM CDT

## 2025-05-06 NOTE — TELEPHONE ENCOUNTER
Refill Decision Note   Herman Taran  is requesting a refill authorization.  Brief Assessment and Rationale for Refill:  Approve     Medication Therapy Plan: PCP previously prescribed rx      Comments:     Note composed:7:04 PM 05/05/2025

## (undated) DEVICE — SOL IRR STRL WATER 500ML

## (undated) DEVICE — SHEILD & GARTERS FOX METAL EYE

## (undated) DEVICE — Device

## (undated) DEVICE — GLOVE BIOGEL SKINSENSE PI 7.5

## (undated) DEVICE — DUOVISC

## (undated) DEVICE — DRESSING LEUKOPLAST FLEX 1X3IN

## (undated) DEVICE — BLADE SURG BVL ANG COAX 2.4MM

## (undated) DEVICE — SYR SLIP TIP 1CC

## (undated) DEVICE — SOL BETADINE 5%